# Patient Record
Sex: MALE | Race: BLACK OR AFRICAN AMERICAN | Employment: OTHER | URBAN - METROPOLITAN AREA
[De-identification: names, ages, dates, MRNs, and addresses within clinical notes are randomized per-mention and may not be internally consistent; named-entity substitution may affect disease eponyms.]

---

## 2019-02-08 ENCOUNTER — HOSPITAL ENCOUNTER (OUTPATIENT)
Dept: ULTRASOUND IMAGING | Age: 79
Discharge: HOME | End: 2019-02-08
Attending: INTERNAL MEDICINE
Payer: MEDICARE

## 2019-02-08 DIAGNOSIS — N18.30 CHRONIC KIDNEY DISEASE, STAGE III (MODERATE) (HCC): ICD-10-CM

## 2019-02-08 DIAGNOSIS — E78.00 HYPERCHOLESTEREMIA: ICD-10-CM

## 2019-02-08 DIAGNOSIS — I12.9 MALIGNANT HYPERTENSIVE KIDNEY DISEASE WITH CHRONIC KIDNEY DISEASE STAGE I THROUGH STAGE IV, OR UNSPECIFIED(403.00): ICD-10-CM

## 2019-02-08 PROCEDURE — 76770 US EXAM ABDO BACK WALL COMP: CPT

## 2021-07-27 ENCOUNTER — APPOINTMENT (OUTPATIENT)
Dept: GENERAL RADIOLOGY | Age: 81
End: 2021-07-27
Attending: EMERGENCY MEDICINE
Payer: MEDICARE

## 2021-07-27 ENCOUNTER — HOSPITAL ENCOUNTER (EMERGENCY)
Age: 81
Discharge: HOME | End: 2021-07-27
Attending: EMERGENCY MEDICINE
Payer: MEDICARE

## 2021-07-27 VITALS
WEIGHT: 173.72 LBS | BODY MASS INDEX: 27.92 KG/M2 | TEMPERATURE: 98 F | HEIGHT: 66 IN | SYSTOLIC BLOOD PRESSURE: 157 MMHG | OXYGEN SATURATION: 100 % | DIASTOLIC BLOOD PRESSURE: 68 MMHG | RESPIRATION RATE: 20 BRPM | HEART RATE: 68 BPM

## 2021-07-27 DIAGNOSIS — S46.812A STRAIN OF LEFT TRAPEZIUS MUSCLE, INITIAL ENCOUNTER: ICD-10-CM

## 2021-07-27 DIAGNOSIS — M25.512 PAIN IN JOINT OF LEFT SHOULDER: Primary | ICD-10-CM

## 2021-07-27 PROCEDURE — 99283 EMERGENCY DEPT VISIT LOW MDM: CPT

## 2021-07-27 PROCEDURE — 96372 THER/PROPH/DIAG INJ SC/IM: CPT

## 2021-07-27 PROCEDURE — 74011000250 HC RX REV CODE- 250: Performed by: STUDENT IN AN ORGANIZED HEALTH CARE EDUCATION/TRAINING PROGRAM

## 2021-07-27 PROCEDURE — 73030 X-RAY EXAM OF SHOULDER: CPT

## 2021-07-27 PROCEDURE — 74011250636 HC RX REV CODE- 250/636: Performed by: STUDENT IN AN ORGANIZED HEALTH CARE EDUCATION/TRAINING PROGRAM

## 2021-07-27 RX ORDER — KETOROLAC TROMETHAMINE 30 MG/ML
30 INJECTION, SOLUTION INTRAMUSCULAR; INTRAVENOUS ONCE
Status: COMPLETED | OUTPATIENT
Start: 2021-07-27 | End: 2021-07-27

## 2021-07-27 RX ORDER — LIDOCAINE 4 G/100G
1 PATCH TOPICAL EVERY 24 HOURS
Status: DISCONTINUED | OUTPATIENT
Start: 2021-07-27 | End: 2021-07-27 | Stop reason: HOSPADM

## 2021-07-27 RX ADMIN — KETOROLAC TROMETHAMINE 30 MG: 30 INJECTION, SOLUTION INTRAMUSCULAR; INTRAVENOUS at 05:22

## 2021-07-27 NOTE — DISCHARGE INSTRUCTIONS
You were seen in the ED today due to left shoulder pain. Your x-ray today was negative. Please follow-up with your primary care provider in the next week for further evaluation of your symptoms. You may take over-the-counter pain medicine such as Aleve to aid with your symptoms. Return to the ED for worsening symptoms or any other urgent or emergent concerns you may have. It was a pleasure taking care of you today. Thanks for entrusting the [facility] team with your care today!

## 2021-07-27 NOTE — ED PROVIDER NOTES
EMERGENCY DEPARTMENT HISTORY AND PHYSICAL EXAM          Date: 7/27/2021  Patient Name: Christina Palencia  Attending of Record: Dr. Danay Caceres    History of Presenting Illness     Chief Complaint   Patient presents with    Shoulder Pain     Patient to triage w c/o of L sided shoulder pain that is causing him the inability to walk, he is ambulatory to triage w his cane. History Provided By: Patient    HPI: Christina Palencia is a [de-identified] y.o. male, with past medical history of arthritis, HLD, HTN who comes in complaining of moderate, constant, progressively worsening left shoulder pain onset last night. Patient denies any known traumas or twisting. Notes worsening of pain when trying to sleep. States has been ambulating with cane secondary to shoulder pain, however denies any lower extremity pain. Denies any associated chest pain, shortness of breath, neck pain, headaches, lightheadedness, fevers, or chills. Has not tried anything for pain. Notes worsening of symptoms with head turning and arm movement. Denies ever having symptoms like these before. PCP: Andreia, Not On File    There are no other complaints, changes, or physical findings at this time. Current Facility-Administered Medications   Medication Dose Route Frequency Provider Last Rate Last Admin    lidocaine 4 % patch 1 Patch  1 Patch TransDERmal Q24H Terese Slaughter MD   1 Patch at 07/27/21 0522     Current Outpatient Medications   Medication Sig Dispense Refill    simvastatin (ZOCOR) 40 mg tablet Take  by mouth nightly.  lisinopril-hydrochlorothiazide (PRINZIDE, ZESTORETIC) 20-12.5 mg per tablet Take  by mouth daily.  metoprolol (LOPRESSOR) 100 mg tablet Take  by mouth daily.          Past History     Past Medical History:  Past Medical History:   Diagnosis Date    Arthritis     Cancer ([facility] [address]     PROSTATE    Hypertension     Other ill-defined conditions     elevated cholesterol       Past Surgical History:  Past Surgical History: Procedure Laterality Date    COLONOSCOPY,DIAGNOSTIC  2012         HX ORTHOPAEDIC  2000    L SHOULDER-UNKNOWN PROCEDURE    HX PROSTATECTOMY      [place] PROSTATE BIOPSY, NEEDLE, SATURATION SAMPLING         Family History:  Family History   Problem Relation Age of Onset    Heart Disease Mother     Heart Disease Father     Heart Disease Brother     Heart Disease Other        Social History:  Social History     Tobacco Use    Smoking status: Former Smoker     Packs/day: 0.01     Years: 1.00     Pack years: 0.01     Quit date: 9/10/1980     Years since quittin.9    Smokeless tobacco: Never Used   Substance Use Topics    Alcohol use: No     Comment: OCCASIONAL    Drug use: No       Allergies:  No Known Allergies      Review of Systems   Review of Systems   Constitutional: Negative for chills, diaphoresis and fever. HENT: Negative for rhinorrhea and sore throat. Respiratory: Negative for cough and shortness of breath. Cardiovascular: Negative for chest pain and leg swelling. Gastrointestinal: Negative for abdominal pain, diarrhea, nausea and vomiting. Genitourinary: Negative for dysuria and urgency. Musculoskeletal: Positive for arthralgias and neck pain. Negative for back pain, joint swelling and neck stiffness. Skin: Negative for pallor and rash. Neurological: Negative for dizziness, light-headedness and headaches. All other systems reviewed and are negative. Physical Exam   Physical Exam  Vitals and nursing note reviewed. Constitutional:       General: He is in acute distress. Appearance: Normal appearance. HENT:      Head: Normocephalic and atraumatic. Nose: Nose normal.   Eyes:      Pupils: Pupils are equal, round, and reactive to light. Cardiovascular:      Rate and Rhythm: Normal rate and regular rhythm. Pulses: Normal pulses. Heart sounds: Normal heart sounds.    Pulmonary:      Effort: Pulmonary effort is normal.      Breath sounds: Normal breath sounds. Abdominal:      Palpations: Abdomen is soft. Tenderness: There is no abdominal tenderness. There is no guarding or rebound. Musculoskeletal:         General: Tenderness present. No swelling, deformity or signs of injury. Normal range of motion. Cervical back: Neck supple. No tenderness. Right lower leg: No edema. Left lower leg: No edema. Comments: Mild tenderness to palpation noted to the left upper trap, with muscle tightness noted to the area; limited range of motion when turning to the left   Skin:     General: Skin is warm and dry. Capillary Refill: Capillary refill takes less than 2 seconds. Neurological:      General: No focal deficit present. Mental Status: He is alert and oriented to person, place, and time. Psychiatric:         Mood and Affect: Mood normal.         Behavior: Behavior normal.         Thought Content: Thought content normal.         Judgment: Judgment normal.         Diagnostic Study Results     Labs -   No results found for this or any previous visit (from the past 12 hour(s)). Radiologic Studies -   XR SHOULDER LT AP/LAT MIN 2 V   Final Result   No acute abnormality. CT Results  (Last 48 hours)    None        CXR Results  (Last 48 hours)    None            Medical Decision Making   I am the first provider for this patient. I reviewed the vital signs, available nursing notes, past medical history, past surgical history, family history and social history. Vital Signs-Reviewed the patient's vital signs. Patient Vitals for the past 12 hrs:   Temp Pulse Resp BP SpO2   07/27/21 0248 98 °F (36.7 °C) 68 20 (!) 157/68 100 %         Records Reviewed: Nursing Notes and Old Medical Records    Provider Notes (Medical Decision Making):   Suzy Tran is a [de-identified] y.o. male, with past medical history of arthritis, HLD, HTN who comes in complaining of moderate, constant, progressively worsening left shoulder pain onset last night.   Patient is hemodynamically stable, afebrile, nontoxic-appearing. Full range of motion of the left shoulder without pain elicited on exam.  Tenderness to palpation and tight muscles noted to the left trapezius. Limited range of motion when looking to the left. I suspect the symptoms are secondary to muscular strain. Arthritis is also on the differential.  I have no concern for any fractures or dislocations as patient denies any traumas or falls. Shoulder x-ray ordered to assess for any bony abnormalities. I also have no concern for ACS at this time as this would be an extremely atypical presentation, patient denies any chest pain shortness of breath lightheadedness or any symptoms suggestive of ACS. Will give IM Toradol and lidocaine patch to aid in symptoms. Disposition pending results of x-ray although anticipate that patient will be safe for discharge. ED Course and Progress Notes:   Initial assessment performed. The patients presenting problems have been discussed, and they are in agreement with the care plan formulated and outlined with them. I have encouraged them to ask questions as they arise throughout their visit. ED Course as of Jul 27 0619   Tue Jul 27, 2021   3025 Patient reassessed, states that he is feeling better. I discussed results of imaging with patient as well as care plan, return precautions, and follow-up. Patient verbalizes understanding and agrees with care plan. Will prepare for discharge. [MA]      ED Course User Index  [MA] Hiram Nicholson MD         Diagnosis     Clinical Impression:   1. Pain in joint of left shoulder    2. Strain of left trapezius muscle, initial encounter        Disposition:  Home    DISCHARGE PLAN:    1. Discharge Medication List as of 7/27/2021  6:11 AM        2.    Follow-up Information     Follow up With Specialties Details Why Contact Info    [facility] EMERGENCY DEPT Emergency Medicine Go to  As needed, If symptoms worsen [id number] [facility] [place]  [phone]        Please follow-up with your primary care provider within 1 week for further evaluation.         3. Return to ED if worse         Resident Signature: Dr. Louie Graf; PGY-2, Emergency [facility]

## 2021-10-12 ENCOUNTER — ANESTHESIA EVENT (OUTPATIENT)
Dept: SURGERY | Age: 81
DRG: 335 | End: 2021-10-12
Payer: MEDICARE

## 2021-10-12 ENCOUNTER — HOSPITAL ENCOUNTER (INPATIENT)
Age: 81
LOS: 3 days | Discharge: HOME | DRG: 335 | End: 2021-10-15
Attending: EMERGENCY MEDICINE | Admitting: SURGERY
Payer: MEDICARE

## 2021-10-12 ENCOUNTER — ANESTHESIA (OUTPATIENT)
Dept: SURGERY | Age: 81
DRG: 335 | End: 2021-10-12
Payer: MEDICARE

## 2021-10-12 ENCOUNTER — APPOINTMENT (OUTPATIENT)
Dept: CT IMAGING | Age: 81
DRG: 335 | End: 2021-10-12
Attending: STUDENT IN AN ORGANIZED HEALTH CARE EDUCATION/TRAINING PROGRAM
Payer: MEDICARE

## 2021-10-12 DIAGNOSIS — K55.019: ICD-10-CM

## 2021-10-12 DIAGNOSIS — K55.9 MESENTERIC ISCHEMIA (HCC): ICD-10-CM

## 2021-10-12 DIAGNOSIS — K46.9 ABDOMINAL HERNIA WITHOUT OBSTRUCTION AND WITHOUT GANGRENE, RECURRENCE NOT SPECIFIED, UNSPECIFIED HERNIA TYPE: Primary | ICD-10-CM

## 2021-10-12 DIAGNOSIS — K56.609 SBO (SMALL BOWEL OBSTRUCTION) (HCC): ICD-10-CM

## 2021-10-12 LAB
ALBUMIN SERPL-MCNC: 3.3 G/DL (ref 3.5–5)
ALBUMIN/GLOB SERPL: 0.8 {RATIO} (ref 1.1–2.2)
ALP SERPL-CCNC: 82 U/L (ref 45–117)
ALT SERPL-CCNC: 34 U/L (ref 12–78)
ANION GAP SERPL CALC-SCNC: 5 MMOL/L (ref 5–15)
APPEARANCE UR: CLEAR
AST SERPL-CCNC: 31 U/L (ref 15–37)
ATRIAL RATE: 54 BPM
BACTERIA URNS QL MICRO: NEGATIVE /HPF
BASOPHILS # BLD: 0 K/UL (ref 0–0.1)
BASOPHILS NFR BLD: 0 % (ref 0–1)
BILIRUB SERPL-MCNC: 1.3 MG/DL (ref 0.2–1)
BILIRUB UR QL: NEGATIVE
BUN SERPL-MCNC: 24 MG/DL (ref 6–20)
BUN/CREAT SERPL: 14 (ref 12–20)
CALCIUM SERPL-MCNC: 9.3 MG/DL (ref 8.5–10.1)
CALCULATED P AXIS, ECG09: 23 DEGREES
CALCULATED R AXIS, ECG10: 41 DEGREES
CALCULATED T AXIS, ECG11: 71 DEGREES
CHLORIDE SERPL-SCNC: 110 MMOL/L (ref 97–108)
CO2 SERPL-SCNC: 26 MMOL/L (ref 21–32)
COLOR UR: ABNORMAL
CREAT SERPL-MCNC: 1.77 MG/DL (ref 0.7–1.3)
DIAGNOSIS, 93000: NORMAL
DIFFERENTIAL METHOD BLD: ABNORMAL
EOSINOPHIL # BLD: 0 K/UL (ref 0–0.4)
EOSINOPHIL NFR BLD: 0 % (ref 0–7)
EPITH CASTS URNS QL MICRO: ABNORMAL /LPF
ERYTHROCYTE [DISTWIDTH] IN BLOOD BY AUTOMATED COUNT: 13.1 % (ref 11.5–14.5)
GLOBULIN SER CALC-MCNC: 3.9 G/DL (ref 2–4)
GLUCOSE SERPL-MCNC: 110 MG/DL (ref 65–100)
GLUCOSE UR STRIP.AUTO-MCNC: NEGATIVE MG/DL
HCT VFR BLD AUTO: 38.6 % (ref 36.6–50.3)
HGB BLD-MCNC: 12.7 G/DL (ref 12.1–17)
HGB UR QL STRIP: NEGATIVE
HYALINE CASTS URNS QL MICRO: ABNORMAL /LPF (ref 0–5)
IMM GRANULOCYTES # BLD AUTO: 0 K/UL (ref 0–0.04)
IMM GRANULOCYTES NFR BLD AUTO: 0 % (ref 0–0.5)
KETONES UR QL STRIP.AUTO: ABNORMAL MG/DL
LEUKOCYTE ESTERASE UR QL STRIP.AUTO: NEGATIVE
LIPASE SERPL-CCNC: 115 U/L (ref 73–393)
LYMPHOCYTES # BLD: 1 K/UL (ref 0.8–3.5)
LYMPHOCYTES NFR BLD: 10 % (ref 12–49)
MCH RBC QN AUTO: 27 PG (ref 26–34)
MCHC RBC AUTO-ENTMCNC: 32.9 G/DL (ref 30–36.5)
MCV RBC AUTO: 82.1 FL (ref 80–99)
MONOCYTES # BLD: 0.9 K/UL (ref 0–1)
MONOCYTES NFR BLD: 9 % (ref 5–13)
NEUTS SEG # BLD: 8.3 K/UL (ref 1.8–8)
NEUTS SEG NFR BLD: 81 % (ref 32–75)
NITRITE UR QL STRIP.AUTO: NEGATIVE
NRBC # BLD: 0 K/UL (ref 0–0.01)
NRBC BLD-RTO: 0 PER 100 WBC
P-R INTERVAL, ECG05: 140 MS
PH UR STRIP: 6 [PH] (ref 5–8)
PLATELET # BLD AUTO: 217 K/UL (ref 150–400)
PMV BLD AUTO: 9.8 FL (ref 8.9–12.9)
POTASSIUM SERPL-SCNC: 4.5 MMOL/L (ref 3.5–5.1)
PROT SERPL-MCNC: 7.2 G/DL (ref 6.4–8.2)
PROT UR STRIP-MCNC: NEGATIVE MG/DL
Q-T INTERVAL, ECG07: 448 MS
QRS DURATION, ECG06: 88 MS
QTC CALCULATION (BEZET), ECG08: 424 MS
RBC # BLD AUTO: 4.7 M/UL (ref 4.1–5.7)
RBC #/AREA URNS HPF: ABNORMAL /HPF (ref 0–5)
SODIUM SERPL-SCNC: 141 MMOL/L (ref 136–145)
SP GR UR REFRACTOMETRY: 1.01 (ref 1–1.03)
TROPONIN-HIGH SENSITIVITY: 5 NG/L (ref 0–76)
UA: UC IF INDICATED,UAUC: ABNORMAL
UROBILINOGEN UR QL STRIP.AUTO: 1 EU/DL (ref 0.2–1)
VENTRICULAR RATE, ECG03: 54 BPM
WBC # BLD AUTO: 10.3 K/UL (ref 4.1–11.1)
WBC URNS QL MICRO: ABNORMAL /HPF (ref 0–4)

## 2021-10-12 PROCEDURE — 74177 CT ABD & PELVIS W/CONTRAST: CPT

## 2021-10-12 PROCEDURE — 44005 FREEING OF BOWEL ADHESION: CPT | Performed by: SURGERY

## 2021-10-12 PROCEDURE — 77030011278 HC ELECTRD LIG IMPT COVD -F: Performed by: SURGERY

## 2021-10-12 PROCEDURE — 77030035236 HC SUT PDS STRATFX BARB J&J -B: Performed by: SURGERY

## 2021-10-12 PROCEDURE — 74011250636 HC RX REV CODE- 250/636: Performed by: ANESTHESIOLOGY

## 2021-10-12 PROCEDURE — P9045 ALBUMIN (HUMAN), 5%, 250 ML: HCPCS | Performed by: ANESTHESIOLOGY

## 2021-10-12 PROCEDURE — 77030002996 HC SUT SLK J&J -A: Performed by: SURGERY

## 2021-10-12 PROCEDURE — 77030018390 HC SPNG HEMSTAT2 J&J -B: Performed by: SURGERY

## 2021-10-12 PROCEDURE — 76060000033 HC ANESTHESIA 1 TO 1.5 HR: Performed by: SURGERY

## 2021-10-12 PROCEDURE — 77030005513 HC CATH URETH FOL11 MDII -B: Performed by: SURGERY

## 2021-10-12 PROCEDURE — 77030016154: Performed by: SURGERY

## 2021-10-12 PROCEDURE — 74011250636 HC RX REV CODE- 250/636: Performed by: SURGERY

## 2021-10-12 PROCEDURE — 81001 URINALYSIS AUTO W/SCOPE: CPT

## 2021-10-12 PROCEDURE — 84484 ASSAY OF TROPONIN QUANT: CPT

## 2021-10-12 PROCEDURE — 36415 COLL VENOUS BLD VENIPUNCTURE: CPT

## 2021-10-12 PROCEDURE — 76210000016 HC OR PH I REC 1 TO 1.5 HR: Performed by: SURGERY

## 2021-10-12 PROCEDURE — 83690 ASSAY OF LIPASE: CPT

## 2021-10-12 PROCEDURE — 99222 1ST HOSP IP/OBS MODERATE 55: CPT | Performed by: SURGERY

## 2021-10-12 PROCEDURE — 74011000636 HC RX REV CODE- 636: Performed by: EMERGENCY MEDICINE

## 2021-10-12 PROCEDURE — 2709999900 HC NON-CHARGEABLE SUPPLY: Performed by: SURGERY

## 2021-10-12 PROCEDURE — 96374 THER/PROPH/DIAG INJ IV PUSH: CPT

## 2021-10-12 PROCEDURE — 99284 EMERGENCY DEPT VISIT MOD MDM: CPT

## 2021-10-12 PROCEDURE — 80053 COMPREHEN METABOLIC PANEL: CPT

## 2021-10-12 PROCEDURE — 93005 ELECTROCARDIOGRAM TRACING: CPT

## 2021-10-12 PROCEDURE — 74011250636 HC RX REV CODE- 250/636: Performed by: STUDENT IN AN ORGANIZED HEALTH CARE EDUCATION/TRAINING PROGRAM

## 2021-10-12 PROCEDURE — 76010000149 HC OR TIME 1 TO 1.5 HR: Performed by: SURGERY

## 2021-10-12 PROCEDURE — 77030038692 HC WND DEB SYS IRMX -B: Performed by: SURGERY

## 2021-10-12 PROCEDURE — 77030040361 HC SLV COMPR DVT MDII -B: Performed by: SURGERY

## 2021-10-12 PROCEDURE — 85025 COMPLETE CBC W/AUTO DIFF WBC: CPT

## 2021-10-12 PROCEDURE — 77030011264 HC ELECTRD BLD EXT COVD -A: Performed by: SURGERY

## 2021-10-12 PROCEDURE — 77030013079 HC BLNKT BAIR HGGR 3M -A: Performed by: ANESTHESIOLOGY

## 2021-10-12 PROCEDURE — 77030008684 HC TU ET CUF COVD -B: Performed by: ANESTHESIOLOGY

## 2021-10-12 PROCEDURE — 65270000029 HC RM PRIVATE

## 2021-10-12 PROCEDURE — 77030008462 HC STPLR SKN PROX J&J -A: Performed by: SURGERY

## 2021-10-12 PROCEDURE — 74011000250 HC RX REV CODE- 250: Performed by: ANESTHESIOLOGY

## 2021-10-12 PROCEDURE — 77030026438 HC STYL ET INTUB CARD -A: Performed by: ANESTHESIOLOGY

## 2021-10-12 PROCEDURE — 77030019908 HC STETH ESOPH SIMS -A: Performed by: ANESTHESIOLOGY

## 2021-10-12 RX ORDER — ROCURONIUM BROMIDE 10 MG/ML
INJECTION, SOLUTION INTRAVENOUS AS NEEDED
Status: DISCONTINUED | OUTPATIENT
Start: 2021-10-12 | End: 2021-10-12 | Stop reason: HOSPADM

## 2021-10-12 RX ORDER — HYDROCHLOROTHIAZIDE 25 MG/1
12.5 TABLET ORAL DAILY
Status: DISCONTINUED | OUTPATIENT
Start: 2021-10-13 | End: 2021-10-13

## 2021-10-12 RX ORDER — HYDROMORPHONE HYDROCHLORIDE 2 MG/ML
INJECTION, SOLUTION INTRAMUSCULAR; INTRAVENOUS; SUBCUTANEOUS AS NEEDED
Status: DISCONTINUED | OUTPATIENT
Start: 2021-10-12 | End: 2021-10-12 | Stop reason: HOSPADM

## 2021-10-12 RX ORDER — SODIUM CHLORIDE, SODIUM LACTATE, POTASSIUM CHLORIDE, CALCIUM CHLORIDE 600; 310; 30; 20 MG/100ML; MG/100ML; MG/100ML; MG/100ML
25 INJECTION, SOLUTION INTRAVENOUS CONTINUOUS
Status: DISCONTINUED | OUTPATIENT
Start: 2021-10-12 | End: 2021-10-12 | Stop reason: HOSPADM

## 2021-10-12 RX ORDER — LIDOCAINE HYDROCHLORIDE 10 MG/ML
0.1 INJECTION, SOLUTION EPIDURAL; INFILTRATION; INTRACAUDAL; PERINEURAL AS NEEDED
Status: DISCONTINUED | OUTPATIENT
Start: 2021-10-12 | End: 2021-10-12 | Stop reason: HOSPADM

## 2021-10-12 RX ORDER — FENTANYL CITRATE 50 UG/ML
50 INJECTION, SOLUTION INTRAMUSCULAR; INTRAVENOUS AS NEEDED
Status: DISCONTINUED | OUTPATIENT
Start: 2021-10-12 | End: 2021-10-12 | Stop reason: HOSPADM

## 2021-10-12 RX ORDER — LORAZEPAM 2 MG/ML
1 INJECTION INTRAMUSCULAR
Status: DISCONTINUED | OUTPATIENT
Start: 2021-10-12 | End: 2021-10-15 | Stop reason: HOSPADM

## 2021-10-12 RX ORDER — METOPROLOL TARTRATE 50 MG/1
100 TABLET ORAL DAILY
Status: DISCONTINUED | OUTPATIENT
Start: 2021-10-13 | End: 2021-10-13

## 2021-10-12 RX ORDER — ACETAMINOPHEN 325 MG/1
650 TABLET ORAL
Status: DISCONTINUED | OUTPATIENT
Start: 2021-10-12 | End: 2021-10-15 | Stop reason: HOSPADM

## 2021-10-12 RX ORDER — SODIUM CHLORIDE, SODIUM LACTATE, POTASSIUM CHLORIDE, CALCIUM CHLORIDE 600; 310; 30; 20 MG/100ML; MG/100ML; MG/100ML; MG/100ML
INJECTION, SOLUTION INTRAVENOUS
Status: DISCONTINUED | OUTPATIENT
Start: 2021-10-12 | End: 2021-10-12 | Stop reason: HOSPADM

## 2021-10-12 RX ORDER — ONDANSETRON 2 MG/ML
INJECTION INTRAMUSCULAR; INTRAVENOUS AS NEEDED
Status: DISCONTINUED | OUTPATIENT
Start: 2021-10-12 | End: 2021-10-12 | Stop reason: HOSPADM

## 2021-10-12 RX ORDER — SODIUM CHLORIDE 0.9 % (FLUSH) 0.9 %
5-40 SYRINGE (ML) INJECTION EVERY 8 HOURS
Status: DISCONTINUED | OUTPATIENT
Start: 2021-10-12 | End: 2021-10-15 | Stop reason: HOSPADM

## 2021-10-12 RX ORDER — ENOXAPARIN SODIUM 100 MG/ML
80 INJECTION SUBCUTANEOUS EVERY 12 HOURS
Status: DISCONTINUED | OUTPATIENT
Start: 2021-10-12 | End: 2021-10-14

## 2021-10-12 RX ORDER — LIDOCAINE HYDROCHLORIDE 20 MG/ML
INJECTION, SOLUTION EPIDURAL; INFILTRATION; INTRACAUDAL; PERINEURAL AS NEEDED
Status: DISCONTINUED | OUTPATIENT
Start: 2021-10-12 | End: 2021-10-12 | Stop reason: HOSPADM

## 2021-10-12 RX ORDER — ONDANSETRON 2 MG/ML
4 INJECTION INTRAMUSCULAR; INTRAVENOUS ONCE
Status: COMPLETED | OUTPATIENT
Start: 2021-10-12 | End: 2021-10-12

## 2021-10-12 RX ORDER — ONDANSETRON 2 MG/ML
4 INJECTION INTRAMUSCULAR; INTRAVENOUS
Status: DISCONTINUED | OUTPATIENT
Start: 2021-10-12 | End: 2021-10-15 | Stop reason: HOSPADM

## 2021-10-12 RX ORDER — SODIUM CHLORIDE 0.9 % (FLUSH) 0.9 %
5-40 SYRINGE (ML) INJECTION AS NEEDED
Status: DISCONTINUED | OUTPATIENT
Start: 2021-10-12 | End: 2021-10-15 | Stop reason: HOSPADM

## 2021-10-12 RX ORDER — DEXTROSE, SODIUM CHLORIDE, AND POTASSIUM CHLORIDE 5; .45; .15 G/100ML; G/100ML; G/100ML
50 INJECTION INTRAVENOUS CONTINUOUS
Status: DISCONTINUED | OUTPATIENT
Start: 2021-10-12 | End: 2021-10-15 | Stop reason: HOSPADM

## 2021-10-12 RX ORDER — FENTANYL CITRATE 50 UG/ML
25 INJECTION, SOLUTION INTRAMUSCULAR; INTRAVENOUS
Status: COMPLETED | OUTPATIENT
Start: 2021-10-12 | End: 2021-10-12

## 2021-10-12 RX ORDER — HYDROMORPHONE HYDROCHLORIDE 1 MG/ML
.2-.5 INJECTION, SOLUTION INTRAMUSCULAR; INTRAVENOUS; SUBCUTANEOUS
Status: DISCONTINUED | OUTPATIENT
Start: 2021-10-12 | End: 2021-10-12 | Stop reason: HOSPADM

## 2021-10-12 RX ORDER — LISINOPRIL 20 MG/1
20 TABLET ORAL DAILY
Status: DISCONTINUED | OUTPATIENT
Start: 2021-10-13 | End: 2021-10-13

## 2021-10-12 RX ORDER — NALOXONE HYDROCHLORIDE 0.4 MG/ML
0.4 INJECTION, SOLUTION INTRAMUSCULAR; INTRAVENOUS; SUBCUTANEOUS AS NEEDED
Status: DISCONTINUED | OUTPATIENT
Start: 2021-10-12 | End: 2021-10-15 | Stop reason: HOSPADM

## 2021-10-12 RX ORDER — PROPOFOL 10 MG/ML
INJECTION, EMULSION INTRAVENOUS AS NEEDED
Status: DISCONTINUED | OUTPATIENT
Start: 2021-10-12 | End: 2021-10-12 | Stop reason: HOSPADM

## 2021-10-12 RX ORDER — ONDANSETRON 2 MG/ML
4 INJECTION INTRAMUSCULAR; INTRAVENOUS AS NEEDED
Status: DISCONTINUED | OUTPATIENT
Start: 2021-10-12 | End: 2021-10-12 | Stop reason: HOSPADM

## 2021-10-12 RX ORDER — ALBUMIN HUMAN 50 G/1000ML
SOLUTION INTRAVENOUS AS NEEDED
Status: DISCONTINUED | OUTPATIENT
Start: 2021-10-12 | End: 2021-10-12 | Stop reason: HOSPADM

## 2021-10-12 RX ORDER — MIDAZOLAM HYDROCHLORIDE 1 MG/ML
1 INJECTION, SOLUTION INTRAMUSCULAR; INTRAVENOUS AS NEEDED
Status: DISCONTINUED | OUTPATIENT
Start: 2021-10-12 | End: 2021-10-12 | Stop reason: HOSPADM

## 2021-10-12 RX ORDER — SUCCINYLCHOLINE CHLORIDE 20 MG/ML
INJECTION INTRAMUSCULAR; INTRAVENOUS AS NEEDED
Status: DISCONTINUED | OUTPATIENT
Start: 2021-10-12 | End: 2021-10-12 | Stop reason: HOSPADM

## 2021-10-12 RX ORDER — DIPHENHYDRAMINE HYDROCHLORIDE 50 MG/ML
25 INJECTION, SOLUTION INTRAMUSCULAR; INTRAVENOUS
Status: DISCONTINUED | OUTPATIENT
Start: 2021-10-12 | End: 2021-10-15 | Stop reason: HOSPADM

## 2021-10-12 RX ORDER — CEFAZOLIN SODIUM 1 G/3ML
2 INJECTION, POWDER, FOR SOLUTION INTRAMUSCULAR; INTRAVENOUS ONCE
Status: COMPLETED | OUTPATIENT
Start: 2021-10-12 | End: 2021-10-12

## 2021-10-12 RX ORDER — MORPHINE SULFATE 2 MG/ML
2 INJECTION, SOLUTION INTRAMUSCULAR; INTRAVENOUS
Status: DISCONTINUED | OUTPATIENT
Start: 2021-10-12 | End: 2021-10-15 | Stop reason: HOSPADM

## 2021-10-12 RX ADMIN — ONDANSETRON 4 MG: 2 INJECTION INTRAMUSCULAR; INTRAVENOUS at 11:09

## 2021-10-12 RX ADMIN — ALBUMIN (HUMAN) 250 ML: 2.5 SOLUTION INTRAVENOUS at 14:20

## 2021-10-12 RX ADMIN — SUGAMMADEX 200 MG: 100 INJECTION, SOLUTION INTRAVENOUS at 14:33

## 2021-10-12 RX ADMIN — PHENYLEPHRINE HYDROCHLORIDE 80 MCG: 10 INJECTION INTRAVENOUS at 14:14

## 2021-10-12 RX ADMIN — Medication 10 ML: at 21:28

## 2021-10-12 RX ADMIN — SUCCINYLCHOLINE CHLORIDE 200 MG: 20 INJECTION, SOLUTION INTRAMUSCULAR; INTRAVENOUS at 13:41

## 2021-10-12 RX ADMIN — FENTANYL CITRATE 25 MCG: 50 INJECTION INTRAMUSCULAR; INTRAVENOUS at 15:20

## 2021-10-12 RX ADMIN — PHENYLEPHRINE HYDROCHLORIDE 80 MCG: 10 INJECTION INTRAVENOUS at 14:17

## 2021-10-12 RX ADMIN — FENTANYL CITRATE 25 MCG: 50 INJECTION INTRAMUSCULAR; INTRAVENOUS at 15:15

## 2021-10-12 RX ADMIN — PHENYLEPHRINE HYDROCHLORIDE 80 MCG: 10 INJECTION INTRAVENOUS at 14:02

## 2021-10-12 RX ADMIN — SODIUM CHLORIDE, POTASSIUM CHLORIDE, SODIUM LACTATE AND CALCIUM CHLORIDE: 600; 310; 30; 20 INJECTION, SOLUTION INTRAVENOUS at 13:37

## 2021-10-12 RX ADMIN — POTASSIUM CHLORIDE, DEXTROSE MONOHYDRATE AND SODIUM CHLORIDE 125 ML/HR: 150; 5; 450 INJECTION, SOLUTION INTRAVENOUS at 18:51

## 2021-10-12 RX ADMIN — FENTANYL CITRATE 25 MCG: 50 INJECTION INTRAMUSCULAR; INTRAVENOUS at 15:30

## 2021-10-12 RX ADMIN — FENTANYL CITRATE 25 MCG: 50 INJECTION INTRAMUSCULAR; INTRAVENOUS at 15:10

## 2021-10-12 RX ADMIN — ONDANSETRON HYDROCHLORIDE 4 MG: 2 INJECTION, SOLUTION INTRAMUSCULAR; INTRAVENOUS at 14:01

## 2021-10-12 RX ADMIN — FENTANYL CITRATE 25 MCG: 50 INJECTION INTRAMUSCULAR; INTRAVENOUS at 15:05

## 2021-10-12 RX ADMIN — IOPAMIDOL 100 ML: 755 INJECTION, SOLUTION INTRAVENOUS at 11:06

## 2021-10-12 RX ADMIN — LIDOCAINE HYDROCHLORIDE 100 MG: 20 INJECTION, SOLUTION INTRAVENOUS at 13:41

## 2021-10-12 RX ADMIN — ROCURONIUM BROMIDE 20 MG: 10 INJECTION INTRAVENOUS at 13:57

## 2021-10-12 RX ADMIN — HYDROMORPHONE HYDROCHLORIDE 1 MG: 2 INJECTION, SOLUTION INTRAMUSCULAR; INTRAVENOUS; SUBCUTANEOUS at 13:41

## 2021-10-12 RX ADMIN — ENOXAPARIN SODIUM 80 MG: 80 INJECTION SUBCUTANEOUS at 21:28

## 2021-10-12 RX ADMIN — FENTANYL CITRATE 25 MCG: 50 INJECTION INTRAMUSCULAR; INTRAVENOUS at 15:40

## 2021-10-12 RX ADMIN — PHENYLEPHRINE HYDROCHLORIDE 80 MCG: 10 INJECTION INTRAVENOUS at 14:04

## 2021-10-12 RX ADMIN — CEFAZOLIN 2 G: 1 INJECTION, POWDER, FOR SOLUTION INTRAMUSCULAR; INTRAVENOUS; PARENTERAL at 13:53

## 2021-10-12 RX ADMIN — FENTANYL CITRATE 25 MCG: 50 INJECTION INTRAMUSCULAR; INTRAVENOUS at 15:00

## 2021-10-12 RX ADMIN — PROPOFOL 150 MG: 10 INJECTION, EMULSION INTRAVENOUS at 13:41

## 2021-10-12 RX ADMIN — FENTANYL CITRATE 25 MCG: 50 INJECTION INTRAMUSCULAR; INTRAVENOUS at 15:25

## 2021-10-12 NOTE — PERIOP NOTES
13:19= has not had COVID vaccination, been tested, been exposed or had any s/s virus. Warming blanket applied. 13:30= mepilex dsg applied to sacrum; no erythema or skin breakdown noted; skin CDI.     13:43= called son, Chato Berry, to inform that his father had gone to surgery and Dr Romeo Gardiner will call him to update afterwards.

## 2021-10-12 NOTE — H&P
Surgery Consult    Subjective:      Landy Potter is a [de-identified] y.o. male who is being seen for evaluation of abdominal pain. The pain is located in the suprapubic. Pain is described as cramping and colicky and measures 3/27 in intensity. Onset of pain was 1 day ago. Aggravating factors include movement, activity and pressure. Alleviating factors include none. Associated symptoms include nausea. He has had a robotic prostatectomy in the past.      Past Medical History:   Diagnosis Date    Arthritis     Cancer ([address]     PROSTATE    Hypertension     Other ill-defined conditions(799.89)     elevated cholesterol      Past Surgical History:   Procedure Laterality Date    COLONOSCOPY,DIAGNOSTIC  2012         HX ORTHOPAEDIC  2000    L SHOULDER-UNKNOWN PROCEDURE    HX PROSTATECTOMY      [place] PROSTATE BIOPSY, NEEDLE, SATURATION SAMPLING        Social History     Tobacco Use    Smoking status: Former Smoker     Packs/day: 0.01     Years: 1.00     Pack years: 0.01     Quit date: 9/10/1980     Years since quittin.1    Smokeless tobacco: Never Used   Substance Use Topics    Alcohol use: Yes     Comment: OCCASIONAL      Family History   Problem Relation Age of Onset    Heart Disease Mother     Heart Disease Father     Heart Disease Brother     Heart Disease Other       No current facility-administered medications for this encounter. No Known Allergies    Review of Systems:    A comprehensive review of systems was negative except for that written in the History of Present Illness.     Objective:        Visit Vitals  /73 (BP 1 Location: Right upper arm, BP Patient Position: At rest)   Pulse 61   Temp 99.5 °F (37.5 °C)   Resp 20   Ht 5' 7\" (1.702 m)   Wt 77.1 kg (170 lb)   SpO2 100%   BMI 26.63 kg/m²       Physical Exam:  GENERAL: alert, cooperative, no distress, appears stated age, EYE: negative, LUNG: clear to auscultation bilaterally, HEART: regular rate and rhythm, S1, S2 normal, no murmur, click, rub or gallop, ABDOMEN: distended, tympanic, soft, no real tenderness     Imaging:    IMPRESSION  1. Mid abdominal internal hernia with significant mass effect on the superior  mesenteric vein and possibly the superior mesenteric artery at this level. More  distal superior mesenteric veins are enlarged, edematous and hypodense,  suggesting a lack of adequate perfusion. Recommend emergent surgical  consultation. 2. Several nonspecific sclerotic osseous foci, as described above. The patient's  clinical history of prostate CA, recommend correlation with appropriate clinical  parameters and consider bone scan or MR for further evaluation.     Findings were communicated verbally to the ED at the time of interpretation. 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 I recommend proceeding with Surgery:  Exploratory laparotomy. 2. Discussed aspects of surgical intervention, methods, risks (including by not limited to infection, bleeding, hematoma, and perforation of the intestines or solid organs), and the risks of general anesthetic. The patient understands the risks; any and all questions were answered to the patient's satisfaction. 3. Patient does wish to proceed with surgery.

## 2021-10-12 NOTE — ED PROVIDER NOTES
EMERGENCY DEPARTMENT HISTORY AND PHYSICAL EXAM          Date: 10/12/2021  Patient Name: Daryle Block  Attending of Record:     History of Presenting Illness     Chief Complaint   Patient presents with    Abdominal Pain     diffuse abdominal pain since Sunday with nausea but no v/d or fever       History Provided By: Patient    HPI: Daryle Block is a [de-identified] y.o. male with PMH of HTN, HLD, arthritis who is presenting to the emergency department with abdominal pain that started yesterday morning. Patient describes this pain as crampy/achy pain. He is unsure if anything makes the pain worse or better. He has been able to tolerate p.o., but has had significant nausea. He does endorse subjective fever, saying that he felt very sweaty and hot earlier this morning. Denies any headache, cough, congestion, chest pain, shortness of breath, vomiting, diarrhea, constipation, bloody bowel movements dysuria, hematuria. ROS is otherwise negative. PCP: Andreia Not On File, MD    There are no other complaints, changes, or physical findings at this time.      Current Facility-Administered Medications   Medication Dose Route Frequency Provider Last Rate Last Admin    [START ON 10/13/2021] metoprolol tartrate (LOPRESSOR) tablet 100 mg  100 mg Oral DAILY Kylie Coburn MD        dextrose 5% - 0.45% NaCl with KCl 20 mEq/L infusion  125 mL/hr IntraVENous CONTINUOUS Kylie Coburn MD        sodium chloride (NS) flush 5-40 mL  5-40 mL IntraVENous Q8H Kylie Coburn MD        sodium chloride (NS) flush 5-40 mL  5-40 mL IntraVENous PRN Kylie Coburn MD        acetaminophen (TYLENOL) tablet 650 mg  650 mg Oral Q6H PRN Kylie Coburn MD        naloxone [facility]) injection 0.4 mg  0.4 mg IntraVENous PRN Kylie Coburn MD        ondansetron [facility]) injection 4 mg  4 mg IntraVENous Q4H PRN Kylie Coburn MD        diphenhydrAMINE (BENADRYL) injection 25 mg  25 mg IntraVENous Q6H PRN Kylie Coburn MD        morphine injection 2 mg  2 mg IntraVENous Q2H PRN Aime Jameson MD        LORazepam (ATIVAN) injection 1 mg  1 mg IntraVENous Q4H PRN Aime Jameson MD        enoxaparin (LOVENOX) injection 80 mg  80 mg SubCUTAneous Q12H Aime Jameson MD        [START ON 10/13/2021] lisinopriL (PRINIVIL, ZESTRIL) tablet 20 mg  20 mg Oral DAILY Aime Jameson MD        And    [START ON 10/13/2021] hydroCHLOROthiazide (HYDRODIURIL) tablet 12.5 mg  12.5 mg Oral DAILY Aime Jameson MD           Past History     Past Medical History:  Past Medical History:   Diagnosis Date    Arthritis     Cancer ([facility] [address]     PROSTATE    Hypertension     Other ill-defined conditions(799.89)     elevated cholesterol       Past Surgical History:  Past Surgical History:   Procedure Laterality Date    COLONOSCOPY,DIAGNOSTIC  2012         HX ORTHOPAEDIC  2000    L SHOULDER-UNKNOWN PROCEDURE    HX PROSTATECTOMY      MT PROSTATE BIOPSY, NEEDLE, SATURATION SAMPLING         Family History:  Family History   Problem Relation Age of Onset    Heart Disease Mother     Heart Disease Father     Heart Disease Brother     Heart Disease Other        Social History:  Social History     Tobacco Use    Smoking status: Former Smoker     Packs/day: 0.01     Years: 1.00     Pack years: 0.01     Quit date: 9/10/1980     Years since quittin.1    Smokeless tobacco: Never Used   Substance Use Topics    Alcohol use: Yes     Comment: OCCASIONAL    Drug use: No       Allergies:  No Known Allergies      Review of Systems   Review of Systems   Constitutional: Positive for diaphoresis and fever (subjective). Negative for chills. HENT: Negative for congestion and sore throat. Respiratory: Negative for cough and shortness of breath. Cardiovascular: Negative for chest pain and leg swelling. Gastrointestinal: Positive for abdominal pain (periumbilical, suprapubic) and nausea. Negative for blood in stool, constipation, diarrhea and vomiting. Genitourinary: Negative for dysuria and hematuria. Musculoskeletal: Negative for arthralgias. Skin: Negative for rash. Neurological: Negative for light-headedness and headaches. Hematological: Does not bruise/bleed easily. Physical Exam   Physical Exam  Constitutional:       General: He is not in acute distress. HENT:      Head: Normocephalic and atraumatic. Nose: Nose normal.   Eyes:      Conjunctiva/sclera: Conjunctivae normal.   Cardiovascular:      Comments: Normal peripheral perfusion  Pulmonary:      Effort: Pulmonary effort is normal. No respiratory distress. Abdominal:      General: There is no distension. Palpations: Abdomen is soft. Tenderness: There is abdominal tenderness (mild TTP over suprapubic region). There is no guarding. Musculoskeletal:         General: No swelling or deformity. Cervical back: Neck supple. Skin:     General: Skin is warm and dry. Neurological:      Mental Status: He is alert and oriented to person, place, and time. Comments: Moving all extremities spontaneously    Psychiatric:         Mood and Affect: Mood normal.         Behavior: Behavior normal.       Diagnostic Study Results     Labs -     Recent Results (from the past 12 hour(s))   CBC WITH AUTOMATED DIFF    Collection Time: 10/12/21 10:08 AM   Result Value Ref Range    WBC 10.3 4.1 - 11.1 K/uL    RBC 4.70 4. 10 - 5.70 M/uL    HGB 12.7 12.1 - 17.0 g/dL    HCT 38.6 36.6 - 50.3 %    MCV 82.1 80.0 - 99.0 FL    MCH 27.0 26.0 - 34.0 PG    MCHC 32.9 30.0 - 36.5 g/dL    RDW 13.1 11.5 - 14.5 %    PLATELET 320 859 - 798 K/uL    MPV 9.8 8.9 - 12.9 FL    NRBC 0.0 0  WBC    ABSOLUTE NRBC 0.00 0.00 - 0.01 K/uL    NEUTROPHILS 81 (H) 32 - 75 %    LYMPHOCYTES 10 (L) 12 - 49 %    MONOCYTES 9 5 - 13 %    EOSINOPHILS 0 0 - 7 %    BASOPHILS 0 0 - 1 %    IMMATURE GRANULOCYTES 0 0.0 - 0.5 %    ABS. NEUTROPHILS 8.3 (H) 1.8 - 8.0 K/UL    ABS. LYMPHOCYTES 1.0 0.8 - 3.5 K/UL    ABS.  MONOCYTES 0.9 0.0 - 1.0 K/UL    ABS. EOSINOPHILS 0.0 0.0 - 0.4 K/UL    ABS. BASOPHILS 0.0 0.0 - 0.1 K/UL    ABS. IMM. GRANS. 0.0 0.00 - 0.04 K/UL    DF AUTOMATED     METABOLIC PANEL, COMPREHENSIVE    Collection Time: 10/12/21 10:08 AM   Result Value Ref Range    Sodium 141 136 - 145 mmol/L    Potassium 4.5 3.5 - 5.1 mmol/L    Chloride 110 (H) 97 - 108 mmol/L    CO2 26 21 - 32 mmol/L    Anion gap 5 5 - 15 mmol/L    Glucose 110 (H) 65 - 100 mg/dL    BUN 24 (H) 6 - 20 MG/DL    Creatinine 1.77 (H) 0.70 - 1.30 MG/DL    BUN/Creatinine ratio 14 12 - 20      GFR est AA 45 (L) >60 ml/min/1.73m2    GFR est non-AA 37 (L) >60 ml/min/1.73m2    Calcium 9.3 8.5 - 10.1 MG/DL    Bilirubin, total 1.3 (H) 0.2 - 1.0 MG/DL    ALT (SGPT) 34 12 - 78 U/L    AST (SGOT) 31 15 - 37 U/L    Alk.  phosphatase 82 45 - 117 U/L    Protein, total 7.2 6.4 - 8.2 g/dL    Albumin 3.3 (L) 3.5 - 5.0 g/dL    Globulin 3.9 2.0 - 4.0 g/dL    A-G Ratio 0.8 (L) 1.1 - 2.2     URINALYSIS W/ REFLEX CULTURE    Collection Time: 10/12/21 10:50 AM    Specimen: Urine   Result Value Ref Range    Color YELLOW/STRAW      Appearance CLEAR CLEAR      Specific gravity 1.014 1.003 - 1.030      pH (UA) 6.0 5.0 - 8.0      Protein Negative NEG mg/dL    Glucose Negative NEG mg/dL    Ketone TRACE (A) NEG mg/dL    Bilirubin Negative NEG      Blood Negative NEG      Urobilinogen 1.0 0.2 - 1.0 EU/dL    Nitrites Negative NEG      Leukocyte Esterase Negative NEG      WBC 0-4 0 - 4 /hpf    RBC 0-5 0 - 5 /hpf    Epithelial cells FEW FEW /lpf    Bacteria Negative NEG /hpf    UA:UC IF INDICATED CULTURE NOT INDICATED BY UA RESULT CNI      Hyaline cast 0-2 0 - 5 /lpf   EKG, 12 LEAD, INITIAL    Collection Time: 10/12/21 10:59 AM   Result Value Ref Range    Ventricular Rate 54 BPM    Atrial Rate 54 BPM    P-R Interval 140 ms    QRS Duration 88 ms    Q-T Interval 448 ms    QTC Calculation (Bezet) 424 ms    Calculated P Axis 23 degrees    Calculated R Axis 41 degrees    Calculated T Axis 71 degrees Diagnosis       Sinus bradycardia  Nonspecific T wave abnormality    Confirmed by Gerry Caicedo ([id number]) on 10/12/2021 5:27:31 PM     LIPASE    Collection Time: 10/12/21 11:08 AM   Result Value Ref Range    Lipase 115 73 - 393 U/L   TROPONIN-HIGH SENSITIVITY    Collection Time: 10/12/21 11:08 AM   Result Value Ref Range    Troponin-High Sensitivity 5 0 - 76 ng/L       Radiologic Studies -   CT ABD PELV W CONT   Final Result   1. Mid abdominal internal hernia with significant mass effect on the superior   mesenteric vein and possibly the superior mesenteric artery at this level. More   distal superior mesenteric veins are enlarged, edematous and hypodense,   suggesting a lack of adequate perfusion. Recommend emergent surgical   consultation. 2. Several nonspecific sclerotic osseous foci, as described above. The patient's   clinical history of prostate CA, recommend correlation with appropriate clinical   parameters and consider bone scan or MR for further evaluation. Findings were communicated verbally to the ED at the time of interpretation. CT Results  (Last 48 hours)               10/12/21 1138  CT ABD PELV W CONT Final result    Impression:  1. Mid abdominal internal hernia with significant mass effect on the superior   mesenteric vein and possibly the superior mesenteric artery at this level. More   distal superior mesenteric veins are enlarged, edematous and hypodense,   suggesting a lack of adequate perfusion. Recommend emergent surgical   consultation. 2. Several nonspecific sclerotic osseous foci, as described above. The patient's   clinical history of prostate CA, recommend correlation with appropriate clinical   parameters and consider bone scan or MR for further evaluation. Findings were communicated verbally to the ED at the time of interpretation. Narrative:  INDICATION: Suprapubic abdominal pain. CT of the abdomen and pelvis is performed with 5 mm collimation.  Study is   performed with 100 cc of nonionic Isovue 370. Sagittal and coronal reformatted   images were also performed. CT dose reduction was achieved with the use of the standardized protocol   tailored for this examination and automatic exposure control for dose   modulation. There is no prior study for direct comparison. Findings:       Lung bases: The visualized lung bases are clear. Liver: The liver is normal.       Adrenals: Adrenal glands are normal.       Pancreas: The pancreas is normal.       Gallbladder: The gallbladder is normal.       Kidneys: There is a 2.3 cm left renal cyst. There are also several bilateral   subcentimeter renal hypodensities, too small to further characterize, but   statistically likely to represent cysts. There is a subcentimeter left renal   hypodensity with peripheral calcifications, also likely representing cysts. Kidneys otherwise enhance and excrete promptly and symmetrically. There is no   hydronephrosis. Spleen: The spleen is normal.       Lymph nodes. There is no ashwini hepatitis, mesenteric, retroperitoneal or pelvic   lymphadenopathy. Bowel: Within the mid abdomen there appears to be an internal hernia with   whirling of the mesentery and significant extrinsic mass effect on the superior   mesenteric vein at this level; the more distal mesenteric veins are edematous   demonstrate hypoenhancement suggesting significantly diminished or absent flow. There also may be extrinsic mass effect on the superior mesenteric artery at   this level which is moderately narrowed, however this is difficult to evaluate   due to associated atherosclerotic plaque within the superior mesenteric artery   at this level and it should be noted that a dedicated CTA was not performed.    While there are no dilated loops of small or large bowel, one of the small bowel   loops within the left lower quadrant of the abdomen may be thickened There is a   small amount of free fluid in the abdomen and pelvis. There is no free   intraperitoneal gas. There is no focal fluid collection to suggest abscess. Appendix: The appendix is normal.       Urinary bladder: Urinary bladder is partially filled and grossly normal.       Bones: There are several sclerotic foci within the lower thoracic and lumbar   spine as well as the pelvic bones. Osseous structures are otherwise diffusely   demineralized. There is no acute fracture or subluxation. Miscellaneous: There is a 9.6 cm x 5.9 cm right inguinal hernia containing small   bowel loops and fluid; the small bowel loops entering and exiting the right   inguinal hernia are nondilated. There is a small left inguinal hernia containing   fat and fluid. CXR Results  (Last 48 hours)    None            Medical Decision Making   I am the first provider for this patient. I reviewed the vital signs, available nursing notes, past medical history, past surgical history, family history and social history. Vital Signs-Reviewed the patient's vital signs.   Patient Vitals for the past 12 hrs:   Temp Pulse Resp BP SpO2   10/12/21 1820 98.1 °F (36.7 °C) 68 18 (!) 151/79 100 %   10/12/21 1800 98.1 °F (36.7 °C) 64 16 134/63 99 %   10/12/21 1730  67 14 (!) 140/64 100 %   10/12/21 1700  67 17 (!) 148/70 100 %   10/12/21 1645  67 15 128/70 99 %   10/12/21 1630  72 12 108/66 98 %   10/12/21 1615  71 16 (!) 141/78 100 %   10/12/21 1600  71 19 (!) 145/70 100 %   10/12/21 1545  72 17 (!) 156/69 100 %   10/12/21 1530  64 16 (!) 146/49 100 %   10/12/21 1515  (!) 58 15 132/88 100 %   10/12/21 1505  (!) 59 19 (!) 137/54 100 %   10/12/21 1500  (!) 54 15  100 %   10/12/21 1455  (!) 57 14 (!) 114/51 100 %   10/12/21 1450  (!) 54 15 (!) 112/34 99 %   10/12/21 1448 97.7 °F (36.5 °C) (!) 56 10 124/76 100 %   10/12/21 1445  (!) 54 14 (!) 123/57 100 %   10/12/21 1442 97.7 °F (36.5 °C) (!) 54 15 (!) 118/48 100 %   10/12/21 1316 99.5 °F (37.5 °C) 61 20 139/73 100 %   10/12/21 1004 97.7 °F (36.5 °C) (!) 59 16 125/72 100 %       ED EKG interpretation:  Rhythm: sinus bradycardia; and regular . Rate (approx.): 54; Axis: normal; P wave: normal; QRS interval: normal ; ST/T wave: normal; in  Lead: all; This EKG was interpreted by Keara Gonzalez MD,ED Provider. Records Reviewed: Nursing Notes and Old Medical Records    Provider Notes (Medical Decision Making):   Lucy Medina is a 81-year-old male with history as mentioned above who is presenting to the emergency department with abdominal pain since yesterday. He is hemodynamically stable and nontoxic on exam. Abd appears benign. Ordered lab work-up including troponin, and CT abdomen pelvis. Treated nausea with Zofran. Dispo pending workup. ED Course and Progress Notes:   Initial assessment performed. The patients presenting problems have been discussed, and they are in agreement with the care plan formulated and outlined with them. I have encouraged them to ask questions as they arise throughout their visit. ED Course as of Oct 12 1844   Tue Oct 12, 2021   1214 Findings of mesenteric vein compression on CT abd. Surgery contacted for recs. Patient and family informed of diagnosis. [AC]   7979 Pt sent to preop for surgical management.     [AC]      ED Course User Index  [AC] Viola Jorge MD       Diagnosis     Clinical Impression:   1. Abdominal hernia without obstruction and without gangrene, recurrence not specified, unspecified hernia type    2. Acute occlusion of mesenteric vein (HCC)        Disposition:  Taken to OR. Patient and family counseled on results of workup and treatment plan.         Resident Signature:   Signed By: Keara Gonzalez MD     October 12, 2021

## 2021-10-12 NOTE — ANESTHESIA POSTPROCEDURE EVALUATION
Procedure(s):  LAPAROTOMY,  LYSIS OF ADHESIONS. general    Anesthesia Post Evaluation      Multimodal analgesia: multimodal analgesia used between 6 hours prior to anesthesia start to PACU discharge  Patient location during evaluation: bedside  Patient participation: complete - patient participated  Level of consciousness: awake  Pain management: adequate  Airway patency: patent  Anesthetic complications: no  Cardiovascular status: acceptable  Respiratory status: acceptable  Hydration status: acceptable  Post anesthesia nausea and vomiting:  controlled  Final Post Anesthesia Temperature Assessment:  Normothermia (36.0-37.5 degrees C)      INITIAL Post-op Vital signs:   Vitals Value Taken Time   /70 10/12/21 1600   Temp 36.5 °C (97.7 °F) 10/12/21 1448   Pulse 73 10/12/21 1604   Resp 14 10/12/21 1604   SpO2 100 % 10/12/21 1604   Vitals shown include unvalidated device data.

## 2021-10-12 NOTE — BRIEF OP NOTE
Brief Postoperative Note    Patient: Mino Cruz  YOB: 1940  MRN: [id number]    Date of Procedure: 10/12/2021     Pre-Op Diagnosis: ischemic bowel    Post-Op Diagnosis: Adhesive SBO causing reversible ischemia       Procedure(s):  LAPAROTOMY,  LYSIS OF ADHESIONS    Surgeon(s):  Samantha Dickerson MD    Surgical Assistant: Surg Asst-1: Jone Lee    Anesthesia: General     Estimated Blood Loss (mL): less than 50     Complications: None    Specimens: * No specimens in log *     Implants: * No implants in log *    Drains: * No LDAs found *    Findings: 1) small bowel twisted around an an omental adhesive band 2) small bowel purple at first but became pink with peristalsis on reduction      Electronically Signed by Wolf Canas MD on 10/12/2021 at 2:38 PM

## 2021-10-12 NOTE — PROGRESS NOTES
End of Shift Note    Bedside shift change report given to Mallika Kendrick RN (oncoming nurse) by Maye Elizalde LPN (offgoing nurse). Report included the following information ED Summary, Intake/Output, MAR and Recent Results    Shift worked:  7am-7pm     Shift summary and any significant changes:     Post op plan of care, surgical dressing, diet/clears, pain medications, IV therapy     Concerns for physician to address:  Continue post op plan of care     Zone phone for oncoming shift:  [phone]       Activity:  Activity Level: Up with Assistance  Number times ambulated in hallways past shift: 0  Number of times OOB to chair past shift: 0    Cardiac:   Cardiac Monitoring: No      Cardiac Rhythm: Sinus Rhythm    Access:   Current line(s): PIV     Genitourinary:   Urinary status: orourke    Respiratory:   O2 Device: None (Room air)  Chronic home O2 use?: NO  Incentive spirometer at bedside: N/A     GI:  Last Bowel Movement Date: 10/11/21  Current diet:  DIET NPO Sips of Clear Liquids, Sips of Water with Meds, Ice Chips, Popsicles  Passing flatus: NO  Tolerating current diet: YES       Pain Management:   Patient states pain is manageable on current regimen: YES    Skin:  Demian Score: 20  Interventions: float heels and internal/external urinary devices    Patient Safety:  Fall Score:  Total Score: 3  Interventions: bed/chair alarm  High Fall Risk: Yes    Length of Stay:  Expected LOS: - - -  Actual LOS: 0      Maye Elizalde LPN

## 2021-10-12 NOTE — PERIOP NOTES
TRANSFER - IN REPORT:    Verbal report received from Mikayla RN(name) on [place]  being received from ED(unit) for ordered procedure      Report consisted of patients Situation, Background, Assessment and   Recommendations(SBAR). Information from the following report(s) SBAR, Kardex, Intake/Output, MAR and Recent Results was reviewed with the receiving nurse. Opportunity for questions and clarification was provided. Assessment completed upon patients arrival to unit and care assumed.

## 2021-10-12 NOTE — ANESTHESIA PREPROCEDURE EVALUATION
Relevant Problems   No relevant active problems       Anesthetic History   No history of anesthetic complications            Review of Systems / Medical History  Patient summary reviewed, nursing notes reviewed and pertinent labs reviewed    Pulmonary  Within defined limits                 Neuro/Psych   Within defined limits           Cardiovascular    Hypertension          Hyperlipidemia    Exercise tolerance: >4 METS     GI/Hepatic/Renal  Within defined limits              Endo/Other        Arthritis     Other Findings              Physical Exam    Airway  Mallampati: II  TM Distance: 4 - 6 cm  Neck ROM: normal range of motion   Mouth opening: Normal     Cardiovascular  Regular rate and rhythm,  S1 and S2 normal,  no murmur, click, rub, or gallop  Rhythm: regular  Rate: normal         Dental  No notable dental hx       Pulmonary  Breath sounds clear to auscultation               Abdominal  GI exam deferred       Other Findings            Anesthetic Plan    ASA: 2  Anesthesia type: general    Monitoring Plan: BIS      Induction: Intravenous  Anesthetic plan and risks discussed with: Patient
Breath Sounds equal & clear to percussion & auscultation, no accessory muscle use

## 2021-10-12 NOTE — PROGRESS NOTES
Admission assessment completed by this RN upon arrival to surgical-telemetry unit. Care plan and education initiated. Marie Joyner LPN to assume care of patient at this time.        Guy Cedeño

## 2021-10-12 NOTE — PERIOP NOTES
1442-Handoff Report from Operating Room to PACU    Report received from [facility] and Dr. Guerline Koch regarding Ashley Hendrickson. Surgeon(s):  Sukhwinder Putnam MD  And Procedure(s) (LRB):  LAPAROTOMY,  LYSIS OF ADHESIONS (N/A)  confirmed   with allergies, drains and dressings discussed. Anesthesia type, drugs, patient history, complications, estimated blood loss, vital signs, intake and output, and last pain medication, lines, reversal medications and temperature were reviewed. 1615- Called the number for pt's contact Abdulkadir Fried. No answer received and voicemail box was full. 1645- Pt's granddaughter arrived to the surgery center. Update provided. 1800-TRANSFER - OUT REPORT:    Verbal report given to [facility]) on Ashley Hendrickson  being transferred to surg tele(unit) for routine post - op       Report consisted of patients Situation, Background, Assessment and   Recommendations(SBAR). Information from the following report(s) SBAR, Kardex, OR Summary, Procedure Summary, Intake/Output, MAR and Recent Results was reviewed with the receiving nurse. Opportunity for questions and clarification was provided.       Patient transported with:   [employer]

## 2021-10-12 NOTE — PROGRESS NOTES
Attempted to call patient's son Demetrius Hunt at [phone] form the OR immediated preceding incision and then again post-op. There was no answer and his voicemail is full. No family has checked in at the .

## 2021-10-13 LAB
ANION GAP SERPL CALC-SCNC: 4 MMOL/L (ref 5–15)
BUN SERPL-MCNC: 25 MG/DL (ref 6–20)
BUN/CREAT SERPL: 14 (ref 12–20)
CALCIUM SERPL-MCNC: 8.5 MG/DL (ref 8.5–10.1)
CHLORIDE SERPL-SCNC: 112 MMOL/L (ref 97–108)
CO2 SERPL-SCNC: 26 MMOL/L (ref 21–32)
CREAT SERPL-MCNC: 1.8 MG/DL (ref 0.7–1.3)
ERYTHROCYTE [DISTWIDTH] IN BLOOD BY AUTOMATED COUNT: 13.1 % (ref 11.5–14.5)
GLUCOSE SERPL-MCNC: 144 MG/DL (ref 65–100)
HCT VFR BLD AUTO: 35.1 % (ref 36.6–50.3)
HGB BLD-MCNC: 11.7 G/DL (ref 12.1–17)
MCH RBC QN AUTO: 27.8 PG (ref 26–34)
MCHC RBC AUTO-ENTMCNC: 33.3 G/DL (ref 30–36.5)
MCV RBC AUTO: 83.4 FL (ref 80–99)
NRBC # BLD: 0 K/UL (ref 0–0.01)
NRBC BLD-RTO: 0 PER 100 WBC
PLATELET # BLD AUTO: 194 K/UL (ref 150–400)
PMV BLD AUTO: 10.2 FL (ref 8.9–12.9)
POTASSIUM SERPL-SCNC: 4.1 MMOL/L (ref 3.5–5.1)
RBC # BLD AUTO: 4.21 M/UL (ref 4.1–5.7)
SODIUM SERPL-SCNC: 142 MMOL/L (ref 136–145)
WBC # BLD AUTO: 15.9 K/UL (ref 4.1–11.1)

## 2021-10-13 PROCEDURE — 97161 PT EVAL LOW COMPLEX 20 MIN: CPT

## 2021-10-13 PROCEDURE — 85027 COMPLETE CBC AUTOMATED: CPT

## 2021-10-13 PROCEDURE — 74011250637 HC RX REV CODE- 250/637: Performed by: SURGERY

## 2021-10-13 PROCEDURE — 74011250637 HC RX REV CODE- 250/637: Performed by: NURSE PRACTITIONER

## 2021-10-13 PROCEDURE — 80048 BASIC METABOLIC PNL TOTAL CA: CPT

## 2021-10-13 PROCEDURE — 0DN80ZZ RELEASE SMALL INTESTINE, OPEN APPROACH: ICD-10-PCS | Performed by: SURGERY

## 2021-10-13 PROCEDURE — 74011250636 HC RX REV CODE- 250/636: Performed by: SURGERY

## 2021-10-13 PROCEDURE — 65270000029 HC RM PRIVATE

## 2021-10-13 PROCEDURE — 36415 COLL VENOUS BLD VENIPUNCTURE: CPT

## 2021-10-13 RX ORDER — HYDROCHLOROTHIAZIDE 25 MG/1
25 TABLET ORAL DAILY
Status: DISCONTINUED | OUTPATIENT
Start: 2021-10-13 | End: 2021-10-15 | Stop reason: HOSPADM

## 2021-10-13 RX ORDER — GUAIFENESIN 100 MG/5ML
81 LIQUID (ML) ORAL DAILY
COMMUNITY

## 2021-10-13 RX ORDER — LISINOPRIL 20 MG/1
20 TABLET ORAL DAILY
Status: DISCONTINUED | OUTPATIENT
Start: 2021-10-13 | End: 2021-10-15 | Stop reason: HOSPADM

## 2021-10-13 RX ORDER — MELATONIN
1000 DAILY
COMMUNITY

## 2021-10-13 RX ORDER — ATORVASTATIN CALCIUM 40 MG/1
40 TABLET, FILM COATED ORAL DAILY
COMMUNITY

## 2021-10-13 RX ORDER — LISINOPRIL AND HYDROCHLOROTHIAZIDE 20; 25 MG/1; MG/1
1 TABLET ORAL DAILY
COMMUNITY

## 2021-10-13 RX ADMIN — Medication 10 ML: at 06:08

## 2021-10-13 RX ADMIN — POTASSIUM CHLORIDE, DEXTROSE MONOHYDRATE AND SODIUM CHLORIDE 125 ML/HR: 150; 5; 450 INJECTION, SOLUTION INTRAVENOUS at 02:54

## 2021-10-13 RX ADMIN — ACETAMINOPHEN 650 MG: 325 TABLET ORAL at 15:38

## 2021-10-13 RX ADMIN — ENOXAPARIN SODIUM 80 MG: 80 INJECTION SUBCUTANEOUS at 21:46

## 2021-10-13 RX ADMIN — Medication 10 ML: at 21:46

## 2021-10-13 RX ADMIN — POTASSIUM CHLORIDE, DEXTROSE MONOHYDRATE AND SODIUM CHLORIDE 125 ML/HR: 150; 5; 450 INJECTION, SOLUTION INTRAVENOUS at 15:40

## 2021-10-13 RX ADMIN — LISINOPRIL 20 MG: 20 TABLET ORAL at 11:07

## 2021-10-13 RX ADMIN — HYDROCHLOROTHIAZIDE 25 MG: 25 TABLET ORAL at 11:07

## 2021-10-13 RX ADMIN — Medication 10 ML: at 15:40

## 2021-10-13 RX ADMIN — ENOXAPARIN SODIUM 80 MG: 80 INJECTION SUBCUTANEOUS at 11:07

## 2021-10-13 NOTE — PROGRESS NOTES
Transition of Care Plan:    RUR: 12% \"low risk\"  Disposition: Home w/ follow-up appts  Follow up appointments: PCP & specialists as indicated  DME needed: None  Transportation at Discharge: Family  [place] or means to access home:  Yes   Medicare Letter: Needed at d/c  Is patient a BCPI-A Bundle:  No         If yes, was Bundle Letter given?: N/A    Caregiver Contact: Henri Dial, [phone]  Discharge Caregiver contacted prior to discharge? CM reviewed pt's chart. CM met w/ pt & his granddtr at bedside to introduce role & complete initial assessment. Pt [place] but still actively participated in assessment. Pt confirmed demographic information is up to date. Pt lives w/ his son in single level/ no FRANKLIN house. Physical address: [address] [place] [address], [address] [place] [address]. Reports being independent w/ ADLs/ IADLs & drives at baseline. Pt appears to have plenty of suportive family members outside the home. No use of DME or home O2. [facility]; pt could benefit from PT eval. Pt uses the [facility] in [facility]. Family to transport at d/c. Will continue to follow. Reason for Admission:  SBO (small bowel obstruction) (HCC)                   RUR Score:       12%              Plan for utilizing home health:      Could benefit from PT eval    PCP: First and Last name:  Andreia, Not On File, MD   Name of Practice: [facility]   Are you a current patient: Yes/No: Yes   Approximate date of last visit: Within 6 months   Can you participate in a virtual visit with your PCP:                     Current Advanced Directive/Advance Care Plan: Full Code  [address] (ACP) Conversation  Date of Conversation: 10/13/2021  Conducted with: Patient with Decision Making Capacity    Healthcare Decision Maker:   No healthcare decision makers have been documented.    Click here to complete [employer] including selection of the Healthcare Decision Maker Relationship (ie \"Primary\")    Today we discussed Healthcare Decision Makers. The patient is considering options. Content/Action Overview:   Has NO ACP documents/care preferences - information provided, considering goals and options  Reviewed DNR/DNI and patient elects Full Code (Attempt Resuscitation)    Length of Voluntary ACP Conversation in minutes:  <16 minutes (Non-Billable)    [address] Management Interventions  PCP Verified by CM: Yes  Palliative Care Criteria Met (RRAT>21 & CHF Dx)?: No  Mode of Transport at Discharge:  Other (see comment) (Family)  Transition of Care Consult (CM Consult): Discharge Planning  Discharge Durable Medical Equipment: No  Physical Therapy Consult: No (could benefit)  Occupational Therapy Consult: No  Speech Therapy Consult: No  Support Systems: Child(dong), Other Family Member(s)  Confirm Follow Up Transport: Self  The Plan for Transition of Care is Related to the Following Treatment Goals : Home w/ follow-up appts  Discharge Location  Discharge Placement: Home with family assistance (& follow-up appts)    ELIA Briones  Care Management

## 2021-10-13 NOTE — OP NOTES
[address]  OPERATIVE REPORT    Name:  Leeanna Romano  MR#:  [id number]  :  1940  ACCOUNT #:  [de-identified]  DATE OF SERVICE:  10/12/2021    PREOPERATIVE DIAGNOSES:  1.  Small bowel obstruction. 2.  Mesenteric ischemia. 3.  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 POSTOPERATIVE DIAGNOSES:  1.  Small bowel obstruction. 2.  Mesenteric ischemia. 3.  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 PROCEDURE PERFORMED:  Lysis of adhesions. SURGEON:  Chela Bhatti MD    ASSISTANT:  Isac Gavin. ANESTHESIA:  General endotracheal.    COMPLICATIONS:  None. SPECIMENS REMOVED:  None. IMPLANTS:  None. ESTIMATED BLOOD LOSS:  Less than 50 mL. DRAINS:  None. FINDINGS:  1.  Small bowel volvulus twisted around an omental adhesive band. 2.  Small bowel appeared purple and ischemic initially, but became pink with visible peristalsis upon reduction and on twisting of the volvulus. INDICATIONS FOR PROCEDURE:  The patient is an 26-year-old man who presented to the emergency department with acute on chronic abdominal pain. He is hemodynamically stable and labs were essentially normal, but CT abdomen and pelvis revealed a possible internal hernia causing small bowel obstruction, mesenteric ischemia and occlusion of the superior mesenteric vein, so the patient is taken to the operating room on an urgent basis for exploration. DESCRIPTION OF PROCEDURE:  The patient was identified in the preoperative holding area, informed consent was obtained. He was given preoperative antibiotics. He was then taken to the operating room, placed in a supine position, underwent general endotracheal anesthesia without complication. A Rojas catheter was then inserted under sterile technique. His abdomen was then prepped and draped in usual sterile fashion. A time-out was performed to confirm that the patient by name and that he was presenting for laparotomy.   Once this was confirmed, a midline incision was made starting 4 cm below his xiphoid and extending to 4 cm above his pubic symphysis. Electrocautery was used to dissect through the subcutaneous tissue. The linea alba was divided with electrocautery. The omentum was elevated between two DeBakey forceps and was entered with Metzenbaum scissors. Once entry into the abdomen was obtained, a surgeon's finger was inserted and the remainder of the peritoneum opened with electrocautery over the surgeon's finger to protect the underlying viscera. Upon entry into the abdominal cavity, the small bowel had a dilated purple in appearance. A wound protector was inserted. Once the wound protector was inserted, the mass of small bowel was eviscerated. The small bowel was found to be volvulized along its mesentery with a thick omental band wrapped several times around this volvulized segment. Therefore, the omentum was divided with a Harmonic scalpel and then the omentum untwisted in a counterclockwise direction. Once the omental band was removed, the bowel could be untwisted. The bowel immediately became pink. The small bowel was then run from the ligament of Treitz to the ileocecal valve. There were no signs of any other adhesive processes. There were no masses and the bowel became pink and demonstrated peristalsis. The insertion point of the omentum was found in the mesentery of the distal ileum and this band of omentum was removed at this location using the energy device. The abdomen was then irrigated with Irrisept irrigation solution. After allowing it to dwell for approximately 5 minutes, it was aspirated and the abdomen irrigated with normal saline. The small bowel was checked once again to ensure its viability and the patient was found to have pink, healthy looking bowel with good peristalsis. The omentum was then placed over the exposed bowel.   Two sheets of Seprafilm were placed over the bowel and the midline, and then the midline fascia closed with a running Stratafix symmetric suture. The subcutaneous tissue was irrigated with Irrisept and the skin closed with staples. The patient tolerated the procedure well. There were no apparent complications. Instrument and sponge counts correct x2.         MD JEFFREY [id number]  D:  10/13/2021 10:58  T:  10/13/2021 12:20  JOB #:  [id number]

## 2021-10-13 NOTE — PROGRESS NOTES
End of Shift Note    Bedside shift change report given to Kaushal Lu (oncoming nurse) by Jordy Fregoso (offgoing nurse). Report included the following information SBAR, Kardex, Intake/Output, MAR and Recent Results    Shift worked: 2042-7144     Shift summary and any significant changes:     Patient rested well. No c/o of pain or discomfort noted. Midline incision  dressing is intact. Orourke is patent and draining well. No other issues. Patient is up to the chair. Concerns for physician to address:       Zone phone for oncoming shift:   [phone]       Activity:  Activity Level: Up with Assistance  Number times ambulated in hallways past shift: 0  Number of times OOB to chair past shift: 0    Cardiac:   Cardiac Monitoring: No      Cardiac Rhythm: Sinus Rhythm    Access:   Current line(s): PIV     Genitourinary:   Urinary status: orourke    Respiratory:   O2 Device: None (Room air)  Chronic home O2 use?: N/A  Incentive spirometer at bedside: YES     GI:  Last Bowel Movement Date: 10/11/21  Current diet:  DIET NPO Sips of Clear Liquids, Sips of Water with Meds, Ice Chips, Popsicles  Passing flatus: YES  Tolerating current diet: YES       Pain Management:   Patient states pain is manageable on current regimen: YES    Skin:  Demian Score: 20  Interventions: increase time out of bed, foam dressing and nutritional support     Patient Safety:  Fall Score:  Total Score: 3  Interventions: assistive device (walker, cane, etc), gripper socks and pt to call before getting OOB  High Fall Risk: Yes    Length of Stay:  Expected LOS: - - -  Actual LOS: Chan 1466 LPN

## 2021-10-13 NOTE — PROGRESS NOTES
Pharmacy Medication Reconciliation     The patient was interviewed regarding current PTA medication list, use and drug allergies;  family present in room and obtained permission from patient to discuss drug regimen with visitor(s) present. The patient was questioned regarding use of any other inhalers, topical products, over the counter medications, herbal medications, vitamin products or ophthalmic/nasal/otic medication use. Allergy Update: Patient has no known allergies. Recommendations/Findings: The following amendments were made to the patient's active medication list on file at [facility]:   1) Additions: aspirin, atorvastatin, cholecalciferol    2) Deletions: simvastatin, metoprolol    3) Changes: lisinopril/HCTZ     PTA medication list was corrected to the following:     Prior to Admission Medications   Prescriptions Last Dose Informant Taking?   aspirin 81 mg chewable tablet  Self Yes   Sig: Take 81 mg by mouth daily. atorvastatin (LIPITOR) 40 mg tablet  Self Yes   Sig: Take 40 mg by mouth daily. cholecalciferol (VITAMIN D3) (1000 Units /25 mcg) tablet  Self Yes   Sig: Take 1,000 Units by mouth daily. lisinopril-hydroCHLOROthiazide (PRINZIDE, ZESTORETIC) 20-25 mg per tablet  Self Yes   Sig: Take 1 Tablet by mouth daily.       Facility-Administered Medications: None        Thank you,  Tor Rod, HUMAD

## 2021-10-13 NOTE — PROGRESS NOTES
Comprehensive Nutrition Assessment    Type and Reason for Visit: Initial, Positive nutrition screen    Nutrition Recommendations/Plan:   · Advance diet beyond Clears when medically able   · Added apple Ensure Clear once a day   · Please document % meals and supplements consumed in flowsheet I/O's under intake     Nutrition Assessment:Chart reviewed due to automatic trigger to nutrition services. Pt presents with SBO and mesenteric ischemia. A lysis of adhesion and laparotomy was conducted on 10/12. Pt visited at bedside and reports a good appetite; he consume % per documentation. A UBW of 170 lbs was reported. Pt expressed interest in adding a ONS into his regimen to better meet protein needs; an apple Ensure Clear will be added once a day. Diet will be advanced beyond clears as medically able and hospitalist discretion. Pt did not have any further nutrition questions or concerns at this time. Patient Vitals for the past 168 hrs:   % Diet Eaten   10/13/21 0739 76 - 100%     Last Weight Metric  Weight Loss Metrics 10/12/2021 7/27/2021 9/12/2012 10/25/2011 10/3/2011   Today's Wt 170 lb 173 lb 11.6 oz 247 lb 237 lb 237 lb   BMI 26.63 kg/m2 28.04 kg/m2 35.44 kg/m2 35.51 kg/m2 35.51 kg/m2     Estimated Daily Nutrient Needs:  Energy (kcal): 1872 kcal (1440 × 1.2 AF); Weight Used for Energy Requirements: Current  Protein (g): 77-92 g (1.0-1.2 g/kg BW); Weight Used for Protein Requirements: Current  Fluid (ml/day): 1800 mL/day; Method Used for Fluid Requirements: 1 ml/kcal    Nutrition Related Findings:  Labs: reviewed. Meds: reviewed. BM: 10/11      Wounds:    Surgical incision       Current Nutrition Therapies:  ADULT DIET Clear Liquid    Anthropometric Measures:  · Height:  5' 7\" (170.2 cm)  · Current Body Wt:  77.1 kg (169 lb 15.6 oz)   · BMI Category: Overweight (BMI 25.0-29. 9)       Nutrition Diagnosis:   · Inadequate protein-energy intake related to  (lysis of adhesion and lapartomy procedure) as evidenced by NPO or clear liquid status due to medical condition    Nutrition Interventions:   Food and/or Nutrient Delivery: Continue current diet  Nutrition Education and Counseling: No recommendations at this time  Coordination of Nutrition Care: Continue to monitor while inpatient    Goals:  Pt will consume >75% of meals and ONS next 3-5 days       Nutrition Monitoring and Evaluation:   Behavioral-Environmental Outcomes: None identified  Food/Nutrient Intake Outcomes: Food and nutrient intake, Supplement intake, Diet advancement/tolerance  Physical Signs/Symptoms Outcomes: Biochemical data, GI status, Weight, Skin    Discharge Planning:     Too soon to determine     Electronically signed by Jake Zee on 10/13/2021 at 1:23 PM

## 2021-10-13 NOTE — PROGRESS NOTES
Surgery NP Progress Note    Rachele Velez  [id number]  male  [de-identified] y.o.  1940    s/p LAPAROTOMY,  LYSIS OF ADHESIONS on 10/12/2021   Pt seen with Dr. Bernard Galo      Assessment:   Active Problems:    SBO (small bowel obstruction) ([facility] [address] (10/12/2021)      Mesenteric ischemia ([facility] [address] (10/12/2021)        Expected post-op progress. Plan/Recommendations/Medical Decision Making:     - Mobilize with nursing and OOB to chair for meals  - Liquids and advance as tolerated. - Pain management- Continue current pain control methods.   - VTE Prophylaxis: Lovenox therapeutic for two days due to concern for impaired vascular flow to the mesentery. - Discharge planning, possibly tomorrow if tolerating diet and mobilizing well. Subjective:     Patient has no complaints. Feels much better than pre-op. Pain control adequate. No nausea/vomiting. Negative flatus. Negative stool output. Objective:     Blood pressure 115/71, pulse 77, temperature 99.1 °F (37.3 °C), resp. rate 18, height 5' 7\" (1.702 m), weight 77.1 kg (170 lb), SpO2 97 %. Temp (24hrs), Av.4 °F (36.9 °C), Min:97.7 °F (36.5 °C), Max:99.5 °F (37.5 °C)    Pt resting in chair. NAD   Incisions CDI. Abd soft and appropriately tender. SCDs for mechanical DVT proph while in bed     Body mass index is 26.63 kg/m². Reference: BMI greater than 30 is classified as obesity and greater than 40 is classified as morbid obesity.      Last 3 Recorded Weights in this Encounter    10/12/21 1004   Weight: 77.1 kg (170 lb)         Edwin Crawford NP   MSN, APRN, FNP-C, CWOCN-AP    10/13/21

## 2021-10-13 NOTE — PROGRESS NOTES
PHYSICAL THERAPY EVALUATION/DISCHARGE  Patient: Berta Arias (33 y.o. male)  Date: 10/13/2021  Primary Diagnosis: SBO (small bowel obstruction) (HCC) [K56.609]  Procedure(s) (LRB):  LAPAROTOMY,  LYSIS OF ADHESIONS (N/A) 1 Day Post-Op   Precautions:   Fall      ASSESSMENT  Nurse clears pt for mobility. Pt agreeable to PT eval. Session. Based on the objective data described below, the patient presents with baseline functional mobility without device use or LOB this date, mild lateral sway with wide RADHA (anticipate this is baseline for pt). Pt has supportive son/family in the home available to assist as needed. No further acute care skilled PT needs are identified at this time, will sign off. Functional Outcome Measure: The patient scored 22/28 on the Tinetti outcome measure which is indicative of moderate fall risk. Other factors to consider for discharge: pt has supportive son/family able to provide assist as needed upon discharge      Further skilled acute physical therapy is not indicated at this time. PLAN :  Recommendation for discharge: (in order for the patient to meet his/her long term goals)  No skilled physical therapy/ follow up rehabilitation needs identified at this time. This discharge recommendation:  Has been made in collaboration with the attending provider and/or case management    IF patient discharges home will need the following DME: none       SUBJECTIVE:   Patient stated I feel pretty good.     OBJECTIVE DATA SUMMARY:   HISTORY:    Past Medical History:   Diagnosis Date    Arthritis     Cancer ([facility] [address]     PROSTATE    Hypertension     Other ill-defined conditions(799.89)     elevated cholesterol     Past Surgical History:   Procedure Laterality Date    COLONOSCOPY,DIAGNOSTIC  9/12/2012         HX ORTHOPAEDIC  2000    L SHOULDER-UNKNOWN PROCEDURE    HX PROSTATECTOMY      NC PROSTATE BIOPSY, NEEDLE, SATURATION SAMPLING         Prior level of function: mod.  I with all ADL's, amb. Efforts prior to admission, no device use, still driving per pt report  Personal factors and/or comorbidities impacting plan of care:     Home Situation  Home Environment: Private residence  # Steps to Enter: 0  Wheelchair Ramp: Yes  One/Two Story Residence: One story  Living Alone: No  Support Systems: Child(dong), Other Family Member(s)  Tub or Shower Type: Tub/Shower combination    EXAMINATION/PRESENTATION/DECISION MAKING:   Critical Behavior:  Neurologic State: Alert  Orientation Level: Oriented X4  Cognition: Follows commands     Hearing: Auditory  Auditory Impairment: None  Skin:  Intact (surgical site not observed)  Edema: none  Range Of Motion:  AROM: Within functional limits           PROM: Within functional limits           Strength:    Strength: Within functional limits                    Tone & Sensation:   Tone: Normal              Sensation: Intact               Coordination:  Coordination: Within functional limits     Functional Mobility:  Bed Mobility:  Rolling: Independent  Supine to Sit: Independent  Sit to Supine: Independent  Scooting: Independent  Transfers:  Sit to Stand: Supervision  Stand to Sit: Supervision        Balance:   Sitting: Intact  Standing: Intact; Without support  Ambulation/Gait Training:  Distance (ft): 100 Feet (ft)     Ambulation - Level of Assistance: Stand-by assistance     Gait Description (WDL): Exceptions to WDL  Gait Abnormalities: Other (hip abducted gait quality, increased lateral sway w/o LOB)        Base of Support: Widened     Speed/Marichuy: Pace decreased (<100 feet/min)         Functional Measure:  Tinetti test:    Sitting Balance: 1  Arises: 1  Attempts to Rise: 2  Immediate Standing Balance: 2  Standing Balance: 1  Nudged: 2  Eyes Closed: 1  Turn 360 Degrees - Continuous/Discontinuous: 0  Turn 360 Degrees - Steady/Unsteady: 1  Sitting Down: 1  Balance Score: 12 Balance total score  Indication of Gait: 1  R Step Length/Height: 1  L Step Length/Height: 1  R Foot Clearance: 1  L Foot Clearance: 1  Step Symmetry: 1  Step Continuity: 1  Path: 1  Trunk: 2  Walking Time: 0  Gait Score: 10 Gait total score  Total Score: 22/28 Overall total score         Tinetti Tool Score Risk of Falls  <19 = High Fall Risk  19-24 = Moderate Fall Risk  25-28 = Low Fall Risk  Kirstin ME. Performance-Oriented Assessment of Mobility Problems in Elderly Patients. [facility]; [id number]. (Scoring Description: PT Bulletin Feb. 10, 1993)    Older adults: Mitchell Castellanos et al, 2009; n = 1000 [facility] elderly evaluated with ABC, DANDY, ADL, and IADL)  · Mean DANDY score for males aged 69-68 years = 26.21(3.40)  · Mean DANDY score for females age 69-68 years = 25.16(4.30)  · Mean DANDY score for males over 80 years = 23.29(6.02)  · Mean DANDY score for females over 80 years = 17.20(8.32)          Physical Therapy Evaluation Charge Determination   History Examination Presentation Decision-Making   MEDIUM  Complexity : 1-2 comorbidities / personal factors will impact the outcome/ POC  LOW Complexity : 1-2 Standardized tests and measures addressing body structure, function, activity limitation and / or participation in recreation  LOW Complexity : Stable, uncomplicated  Other outcome measures Tinetti  LOW       Based on the above components, the patient evaluation is determined to be of the following complexity level: LOW     Pain Rating:  No c/o pain    Activity Tolerance:   Good      After treatment patient left in no apparent distress:   Supine in bed, Call bell within reach and Caregiver / family present    COMMUNICATION/EDUCATION:   The patients plan of care was discussed with: Registered nurse. Fall prevention education was provided and the patient/caregiver indicated understanding.     Thank you for this referral.  Claudetta Cannon, PT, DPT   Time Calculation: 15 mins

## 2021-10-14 LAB
ANION GAP SERPL CALC-SCNC: 3 MMOL/L (ref 5–15)
BASOPHILS # BLD: 0.1 K/UL (ref 0–0.1)
BASOPHILS NFR BLD: 0 % (ref 0–1)
BUN SERPL-MCNC: 22 MG/DL (ref 6–20)
BUN/CREAT SERPL: 11 (ref 12–20)
CALCIUM SERPL-MCNC: 8.8 MG/DL (ref 8.5–10.1)
CHLORIDE SERPL-SCNC: 111 MMOL/L (ref 97–108)
CO2 SERPL-SCNC: 25 MMOL/L (ref 21–32)
CREAT SERPL-MCNC: 1.92 MG/DL (ref 0.7–1.3)
DIFFERENTIAL METHOD BLD: ABNORMAL
EOSINOPHIL # BLD: 0.1 K/UL (ref 0–0.4)
EOSINOPHIL NFR BLD: 1 % (ref 0–7)
ERYTHROCYTE [DISTWIDTH] IN BLOOD BY AUTOMATED COUNT: 13.3 % (ref 11.5–14.5)
GLUCOSE SERPL-MCNC: 112 MG/DL (ref 65–100)
HCT VFR BLD AUTO: 30.3 % (ref 36.6–50.3)
HGB BLD-MCNC: 10 G/DL (ref 12.1–17)
IMM GRANULOCYTES # BLD AUTO: 0.1 K/UL (ref 0–0.04)
IMM GRANULOCYTES NFR BLD AUTO: 0 % (ref 0–0.5)
INR PPP: 1.2 (ref 0.9–1.1)
LYMPHOCYTES # BLD: 2.3 K/UL (ref 0.8–3.5)
LYMPHOCYTES NFR BLD: 17 % (ref 12–49)
MCH RBC QN AUTO: 27.2 PG (ref 26–34)
MCHC RBC AUTO-ENTMCNC: 33 G/DL (ref 30–36.5)
MCV RBC AUTO: 82.3 FL (ref 80–99)
MONOCYTES # BLD: 1.7 K/UL (ref 0–1)
MONOCYTES NFR BLD: 12 % (ref 5–13)
NEUTS SEG # BLD: 9.5 K/UL (ref 1.8–8)
NEUTS SEG NFR BLD: 70 % (ref 32–75)
NRBC # BLD: 0 K/UL (ref 0–0.01)
NRBC BLD-RTO: 0 PER 100 WBC
PLATELET # BLD AUTO: 180 K/UL (ref 150–400)
PMV BLD AUTO: 10.4 FL (ref 8.9–12.9)
POTASSIUM SERPL-SCNC: 4.4 MMOL/L (ref 3.5–5.1)
PROTHROMBIN TIME: 12.4 SEC (ref 9–11.1)
RBC # BLD AUTO: 3.68 M/UL (ref 4.1–5.7)
SODIUM SERPL-SCNC: 139 MMOL/L (ref 136–145)
WBC # BLD AUTO: 13.8 K/UL (ref 4.1–11.1)

## 2021-10-14 PROCEDURE — 85025 COMPLETE CBC W/AUTO DIFF WBC: CPT

## 2021-10-14 PROCEDURE — 85610 PROTHROMBIN TIME: CPT

## 2021-10-14 PROCEDURE — 74011250636 HC RX REV CODE- 250/636: Performed by: NURSE PRACTITIONER

## 2021-10-14 PROCEDURE — 74011250636 HC RX REV CODE- 250/636: Performed by: SURGERY

## 2021-10-14 PROCEDURE — 2709999900 HC NON-CHARGEABLE SUPPLY

## 2021-10-14 PROCEDURE — 65270000029 HC RM PRIVATE

## 2021-10-14 PROCEDURE — P9047 ALBUMIN (HUMAN), 25%, 50ML: HCPCS | Performed by: SURGERY

## 2021-10-14 PROCEDURE — 36415 COLL VENOUS BLD VENIPUNCTURE: CPT

## 2021-10-14 PROCEDURE — 86901 BLOOD TYPING SEROLOGIC RH(D): CPT

## 2021-10-14 PROCEDURE — 74011250637 HC RX REV CODE- 250/637: Performed by: SURGERY

## 2021-10-14 PROCEDURE — 86923 COMPATIBILITY TEST ELECTRIC: CPT

## 2021-10-14 PROCEDURE — 80048 BASIC METABOLIC PNL TOTAL CA: CPT

## 2021-10-14 PROCEDURE — P9045 ALBUMIN (HUMAN), 5%, 250 ML: HCPCS | Performed by: NURSE PRACTITIONER

## 2021-10-14 RX ORDER — ALBUMIN HUMAN 50 G/1000ML
SOLUTION INTRAVENOUS
Status: COMPLETED | OUTPATIENT
Start: 2021-10-14 | End: 2021-10-14

## 2021-10-14 RX ORDER — ALBUMIN HUMAN 250 G/1000ML
25 SOLUTION INTRAVENOUS ONCE
Status: COMPLETED | OUTPATIENT
Start: 2021-10-14 | End: 2021-10-14

## 2021-10-14 RX ADMIN — ALBUMIN (HUMAN) 25 G: 2.5 SOLUTION INTRAVENOUS at 04:51

## 2021-10-14 RX ADMIN — Medication 20 ML: at 06:00

## 2021-10-14 RX ADMIN — ALBUMIN (HUMAN) 25 G: 2.5 SOLUTION INTRAVENOUS at 03:50

## 2021-10-14 RX ADMIN — POTASSIUM CHLORIDE, DEXTROSE MONOHYDRATE AND SODIUM CHLORIDE 50 ML/HR: 150; 5; 450 INJECTION, SOLUTION INTRAVENOUS at 22:21

## 2021-10-14 RX ADMIN — ACETAMINOPHEN 650 MG: 325 TABLET ORAL at 20:03

## 2021-10-14 RX ADMIN — SODIUM CHLORIDE, POTASSIUM CHLORIDE, SODIUM LACTATE AND CALCIUM CHLORIDE 250 ML: 600; 310; 30; 20 INJECTION, SOLUTION INTRAVENOUS at 04:17

## 2021-10-14 RX ADMIN — SODIUM CHLORIDE 500 ML: 900 INJECTION, SOLUTION INTRAVENOUS at 22:30

## 2021-10-14 RX ADMIN — SODIUM CHLORIDE, POTASSIUM CHLORIDE, SODIUM LACTATE AND CALCIUM CHLORIDE 250 ML: 600; 310; 30; 20 INJECTION, SOLUTION INTRAVENOUS at 03:15

## 2021-10-14 RX ADMIN — Medication 10 ML: at 14:43

## 2021-10-14 RX ADMIN — SODIUM CHLORIDE, POTASSIUM CHLORIDE, SODIUM LACTATE AND CALCIUM CHLORIDE 250 ML: 600; 310; 30; 20 INJECTION, SOLUTION INTRAVENOUS at 03:41

## 2021-10-14 RX ADMIN — POTASSIUM CHLORIDE, DEXTROSE MONOHYDRATE AND SODIUM CHLORIDE 125 ML/HR: 150; 5; 450 INJECTION, SOLUTION INTRAVENOUS at 04:17

## 2021-10-14 RX ADMIN — ALBUMIN (HUMAN) 25 G: 0.25 INJECTION, SOLUTION INTRAVENOUS at 03:57

## 2021-10-14 NOTE — PROGRESS NOTES
Rapid Response Team Note    Called by RN at 3:03 AM to see patient for hypotension, bleeding from surgical site stat. Upon entering the room the patient was asymptomatic. Events as described by RN caring for patient noted. Visit Vitals  BP (!) 87/55   Pulse 93   Temp 98.7 °F (37.1 °C)   Resp 15   Ht 5' 7\" (1.702 m)   Wt 77.1 kg (170 lb)   SpO2 100%   BMI 26.63 kg/m²       Gen: awake, alert in NAD  HEENT: wnl  Chest: symmetrical  Abd: midline incision with staples with active slow bleed  Neuro: AAO  Ext: BOSE    Pertinent Recent Labs and Xrays:  Recent Labs     10/13/21  0248 10/12/21  1008   WBC 15.9* 10.3   HGB 11.7* 12.7   HCT 35.1* 38.6    217     Recent Labs     10/13/21  0248 10/12/21  1008    141   K 4.1 4.5   * 110*   CO2 26 26   BUN 25* 24*   CREA 1.80* 1.77*   * 110*   CA 8.5 9.3     No results for input(s): INR, INREXT in the last 72 hours. No results for input(s): PH, PCO2, PO2 in the last 72 hours. No results for input(s): PHI, PO2I, PCO2I in the last 72 hours. No results for input(s): CPK, CKNDX, TROIQ in the last 72 hours.     No lab exists for component: CPKMB  No results found for: GLUCPOC      A/P: Hypotension  Surgical site bleeding  -  ml IV bolus  - 25% Albumin 25 g IV x 1  - call surgical team for further orders    Rg Clark NP  Critical care time unrelated to prior notes: 30 minutes

## 2021-10-14 NOTE — PROGRESS NOTES
RAPID RESPONSE TEAM     0302: Unable to go to respond to rapid response given subsequent RRT. CCU RN and Nursing Supervisor notified and responding.     Larisa Escobar RN  RRT  Ext. [phone]

## 2021-10-14 NOTE — PROGRESS NOTES
End of Shift Note    Bedside shift change report given to [facility], [address] (oncoming nurse) by Regine Colon RN (offgoing nurse). Report included the following information SBAR, Kardex, OR Summary, Procedure Summary, Intake/Output and MAR    Shift worked:  7a-7p     Shift summary and any significant changes:     Pt received all care well. No complaints of pain. Walked the miller with PT. Dressing drained quite a bit so dressing was changed, NP Alberto Henning aware. Incision still approximated. Dressing CDI. Concerns for physician to address:  none     Zone phone for oncoming shift:   [phone]       Activity:  Activity Level: Up with Assistance  Number times ambulated in hallways past shift: 1  Number of times OOB to chair past shift: 2    Cardiac:   Cardiac Monitoring: No      Cardiac Rhythm: Sinus Rhythm    Access:   Current line(s): PIV     Genitourinary:   Urinary status: voiding    Respiratory:   O2 Device: None (Room air)  Chronic home O2 use?: NO  Incentive spirometer at bedside: YES     GI:  Last Bowel Movement Date: 10/11/21  Current diet:  ADULT ORAL NUTRITION SUPPLEMENT Dinner; Clear Liquid  ADULT DIET Full Liquid  Passing flatus: YES  Tolerating current diet: YES       Pain Management:   Patient states pain is manageable on current regimen: YES    Skin:  Demian Score: 20  Interventions: increase time out of bed, PT/OT consult and limit briefs    Patient Safety:  Fall Score:  Total Score: 2  Interventions: gripper socks, pt to call before getting OOB and stay with me (per policy)  High Fall Risk: Yes    Length of Stay:  Expected LOS: - - -  Actual LOS: [address], RN

## 2021-10-14 NOTE — PROGRESS NOTES
Travis 94 applied to midline incision due to significant drainage. Blood has saturated the bulky dressing, pt gown, all blankets and has pooled under the patient on L and R abd. Per Day shift RN this happened x1 earlier and NP aware. Pt cleaned up and BP taken: see flowsheets. Pt is asymptomatic of blood loss at this time. 0010 Dressing to midline once again saturated. Reinforced and pressure tape applied. PT BP has decreased, but pt is still A+Ox4 and has no complaints. Perfect serve sent and page to [address] [place] [address],[facility] [phone] call regarding bleeding. 0150 Abdominal dressing saturated again. BP holding at 97/61. Pt otherwise asymptomatic. Reinaleonard Sutherland re-paged. Attempted to reach via cell phone by answering service with no success. Re-dressed abd with gauze, abd pads, medipore and pressure tape. [facility] paged; pt blood pressure 82/53. Ambulated to restroom. Orthostatics measured. Dressing reinforced again. 0300 Rapid response called. CCU RN, Nursing supervisor and myself at bedside.  Repaged Tennille Packer w/ MEWS Score (last day)     Date/Time MEWS Score Pulse Resp Temp BP Level of Consciousness SpO2    10/14/21 0349    97      (!) 92/52    100 %    10/14/21 0340    94      95/61    100 %    10/14/21 0330    (!) 102      (!) 88/57    100 %    10/14/21 0320    100      (!) 91/58    99 %    10/14/21 0313    93  15    (!) 87/55    100 %    10/14/21 0300    (!) 103  15    91/65    92 %    10/14/21 0257  3  (!) 103  14  98.7 °F (37.1 °C)  (!) 79/57  Alert (0)  98 %    10/14/21 0253          (!) 81/52  Alert (0)      10/14/21 0247    [place]    (!) 69/51  Alert (0)      10/14/21 0244    [place]    (!) 97/59  Alert (0)      10/14/21 0242          (!) 81/61  Alert (0)      10/14/21 0238    Shu Jones  (!) 95/57  Alert (0)      10/14/21 0234  Ezequiel Art  (!) 82/53  Alert (0)      10/14/21 0155    93      97/61        10/14/21 0138          (!) 107/59        10/14/21 0017  2 88  16  98.8 °F (37.1 °C)  (!) 96/59  Alert (0)  98 %    10/13/21 2225          (!) 105/57        10/13/21 2036  1  90  16  99.1 °F (37.3 °C)  (!) 102/54  Alert (0)  98 %    10/13/21 1528  2  66  18  100.2 °F (37.9 °C)  100/64  Alert (0)  100 %    10/13/21 1203  1  74  18  98.5 °F (36.9 °C)  116/70  Alert (0)  98 %    10/13/21 0739  1  77  18  99.1 °F (37.3 °C)  115/71  Alert (0)  97 %    10/13/21 0355  1  70  18  98 °F (36.7 °C)  130/60  Alert (0)  96 %    10/13/21 0000  1  62  18  99.1 °F (37.3 °C)  (!) 123/55  Alert (0)  97 %              0320 New dressing applied again, new IV started, Labs drawn, abd binder placed on patient, spoke with maribeth as Bridget Flynn not available. Later spoke with Bridget Flynn. 36 Spoke with Bridget Flynn via telephone. Has no further orders. 1518 [facility] at bedside to assess patient/incicison site. Some extra sutures placed and new dressing applied.        Aron Schlatter, RN

## 2021-10-14 NOTE — PROGRESS NOTES
0300: RRT called overhead    0305: RN arrived to bedside-- primary RN and nursing supervisor at bedside. On arrival, Pt is A/O, ansering all questions appropriately w/ no complaints of pain. BP 91/65  SpO2 92% on RA. Bedside report received -- Pt s/p laparotomy w/ lysis of adhesions d/t SBO on 10/12. Per RN, pt has ssaturated numerous abdominal dressings since start of [facility], RN has paged Dr. Romeo Gardiner multiple times but has been unsuccessful in reaching   Moderate to large amount of bloody draininge present on newly applied midline abdominal dressing. 0310Sylvester Awe, NP at bedside, updated on patient status -- verbal orders received for 250 mL LR bolus once. Orders placed. 0315: STAT lab orders placed for CBC, PT/INR, BMP -- New PIV established      0320: Dr. Cortes Re returned paged, updated on pt status. Verbal orders received for 25g Albumin once and type and screen    0340: Abdominal binder applied for additional support. Dr. Romeo Gardiner updated by primary RN -- Pt remains A? Ox4 w/ minimal complaints of pain BP 95/61 HR 94 SpO2% 100. DAISY Mcguire, RN RRT at bedside, RRT care assumed by DAISY Corbett

## 2021-10-14 NOTE — PROGRESS NOTES
RAPID RESPONSE TEAM    0340: Arrived to bedside s/p rapid response for hypotension and bleeding from abdominal incision. CCU charge RN, nursing supervisor, and primary RN at bedside. Patient receiving 250cc LR bolus. BP 92/52. . Patient asymptomatic. Second 250cc LR bolus ordered per RRT protocol to make 500cc. 25g 25% IV albumin also ordered by Dr. Chris Ford with general surgery. Placed on cardiac monitor. Hgb 10.3 & 30.3.     0431: Dr Bernard Galo at bedside to assess incision site. Continuous bright red oozing and dressing saturated. Sutures placed by MD and incision re dressed. 0455: /60. HR 80s-90s SR. Restarted continuous IVF. Patient staying on unit at this time Please call with any further questions or concerns.     Yris Vizcarra RN   RRT   Ext [phone]

## 2021-10-14 NOTE — PROGRESS NOTES
SURGERY PROGRESS NOTE      Admit Date: 10/12/2021    POD 2 Days Post-Op    Procedure: Procedure(s):  LAPAROTOMY,  LYSIS OF ADHESIONS      Subjective:     Patient denies abdominal pain and nausea. Has had bleeding from incision requiring 4 dressing changes in the last 8 hours . Objective:     Visit Vitals  /60   Pulse (!) 103   Temp 99.1 °F (37.3 °C)   Resp 15   Ht 5' 7\" (1.702 m)   Wt 77.1 kg (170 lb)   SpO2 100%   BMI 26.63 kg/m²        Temp (24hrs), Av.1 °F (37.3 °C), Min:98.5 °F (36.9 °C), Max:100.2 °F (37.9 °C)      No intake/output data recorded. 10/12 0701 - 10/13 1900  In: 7920 [P.O.:625; I.V.:1050]  Out: 1950 [Urine:1950]    Physical Exam:    General:  alert, cooperative, no distress, appears stated age   Abdomen: soft, bowel sounds active, non-tender   Incision:   oozing between staples at 3 locations. Area prepped with betadine. 1% lidocaine injected. 3 figure 8 sutures of 2-0 silk used to stop bleeding. Dry dressing applined           Lab Results   Component Value Date/Time    WBC 13.8 (H) 10/14/2021 03:19 AM    HGB 10.0 (L) 10/14/2021 03:19 AM    HCT 30.3 (L) 10/14/2021 03:19 AM    PLATELET 340  03:19 AM    MCV 82.3 10/14/2021 03:19 AM     Lab Results   Component Value Date/Time    GFR est non-AA 34 (L) 10/14/2021 03:19 AM    GFR est AA 41 (L) 10/14/2021 03:19 AM    Creatinine 1.92 (H) 10/14/2021 03:19 AM    BUN 22 (H) 10/14/2021 03:19 AM    Sodium 139 10/14/2021 03:19 AM    Potassium 4.4 10/14/2021 03:19 AM    Chloride 111 (H) 10/14/2021 03:19 AM    CO2 25 10/14/2021 03:19 AM       Assessment/plan: Active Problems:    SBO (small bowel obstruction) ([address] (10/12/2021)      Mesenteric ischemia ([facility] (10/12/2021)    recovering form CHITO as expected except from skin bleeding from the wound. Will stop lovenox I think risk of SMV thrombosis is low compared to risk of on going bleeding. Will observe today. If no further bleeding can D/C home later today.

## 2021-10-14 NOTE — PROGRESS NOTES
End of Shift Note    Bedside shift change report given to Maralee Litten, RN (oncoming nurse) by Erick Lux RN (offgoing nurse). Report included the following information SBAR, Kardex, Intake/Output, MAR and Recent Results    Shift worked:  [id number]     Shift summary and any significant changes:     Pt bleeding from midline incision and hypotensive this shift- see progress notes regarding status of patient. Stable at this time with no complaints. Dressings changed and current one is CDI with abdominal binder present. Pt tolerated all boluses and given meds. Concerns for physician to address:  Abd bleeding and hypotension.      Zone phone for oncoming shift:          \    Erick Lux RN

## 2021-10-15 VITALS
WEIGHT: 170 LBS | OXYGEN SATURATION: 100 % | BODY MASS INDEX: 26.68 KG/M2 | SYSTOLIC BLOOD PRESSURE: 134 MMHG | HEIGHT: 67 IN | RESPIRATION RATE: 19 BRPM | HEART RATE: 83 BPM | DIASTOLIC BLOOD PRESSURE: 75 MMHG | TEMPERATURE: 99.1 F

## 2021-10-15 LAB
ANION GAP SERPL CALC-SCNC: 1 MMOL/L (ref 5–15)
BUN SERPL-MCNC: 26 MG/DL (ref 6–20)
BUN/CREAT SERPL: 14 (ref 12–20)
CALCIUM SERPL-MCNC: 8.3 MG/DL (ref 8.5–10.1)
CHLORIDE SERPL-SCNC: 113 MMOL/L (ref 97–108)
CO2 SERPL-SCNC: 26 MMOL/L (ref 21–32)
CREAT SERPL-MCNC: 1.91 MG/DL (ref 0.7–1.3)
ERYTHROCYTE [DISTWIDTH] IN BLOOD BY AUTOMATED COUNT: 13.2 % (ref 11.5–14.5)
GLUCOSE SERPL-MCNC: 129 MG/DL (ref 65–100)
HCT VFR BLD AUTO: 21.1 % (ref 36.6–50.3)
HGB BLD-MCNC: 7.2 G/DL (ref 12.1–17)
HISTORY CHECKED?,CKHIST: NORMAL
MCH RBC QN AUTO: 27.9 PG (ref 26–34)
MCHC RBC AUTO-ENTMCNC: 34.1 G/DL (ref 30–36.5)
MCV RBC AUTO: 81.8 FL (ref 80–99)
NRBC # BLD: 0 K/UL (ref 0–0.01)
NRBC BLD-RTO: 0 PER 100 WBC
PLATELET # BLD AUTO: 134 K/UL (ref 150–400)
PMV BLD AUTO: 10.1 FL (ref 8.9–12.9)
POTASSIUM SERPL-SCNC: 4.6 MMOL/L (ref 3.5–5.1)
RBC # BLD AUTO: 2.58 M/UL (ref 4.1–5.7)
SODIUM SERPL-SCNC: 140 MMOL/L (ref 136–145)
WBC # BLD AUTO: 11.1 K/UL (ref 4.1–11.1)

## 2021-10-15 PROCEDURE — P9016 RBC LEUKOCYTES REDUCED: HCPCS

## 2021-10-15 PROCEDURE — 85027 COMPLETE CBC AUTOMATED: CPT

## 2021-10-15 PROCEDURE — 80048 BASIC METABOLIC PNL TOTAL CA: CPT

## 2021-10-15 PROCEDURE — 36415 COLL VENOUS BLD VENIPUNCTURE: CPT

## 2021-10-15 PROCEDURE — 36430 TRANSFUSION BLD/BLD COMPNT: CPT

## 2021-10-15 RX ORDER — SODIUM CHLORIDE 9 MG/ML
250 INJECTION, SOLUTION INTRAVENOUS AS NEEDED
Status: DISCONTINUED | OUTPATIENT
Start: 2021-10-15 | End: 2021-10-15 | Stop reason: HOSPADM

## 2021-10-15 RX ADMIN — Medication 5 ML: at 06:00

## 2021-10-15 NOTE — PROGRESS NOTES
RAPID RESPONSE TEAM- Follow Up     Received call from primary RN Jona Quigley, patient's BP 84/46 (59). Repeat 90/52 (65). Patient asymptomatic. Abdominal dressing intact with no further bleeding noted. Abdomen rounded, but soft. HR SR 80s-90s on tele monitor. Had temp 100.6 @ 2022 with HR 100s; resolved after po tylenol. Primary RN paged Dr. Barb Sutton with general surgery. 500cc NS bolus; no lab orders given. CBC and BMP am labs already ordered; recommend drawing closer to 0000. Will continue to monitor. Please call with any further questions or concerns. Sherryle Brakeman RN  Ext. [phone]    ADDENDUM:    0115: Notified by primary RN that Hgb dropped fro 10.0 to 7.2 in less than 12 hrs. Discussed paging Dr. Barb Sutton to notify and getting blood transfusion order.

## 2021-10-15 NOTE — PROGRESS NOTES
Problem: Falls - Risk of  Goal: *Absence of Falls  Description: Document Prabhu Mess Fall Risk and appropriate interventions in the flowsheet.   Outcome: Resolved/Met     Problem: Patient Education: Go to Patient Education Activity  Goal: Patient/Family Education  Outcome: Resolved/Met

## 2021-10-15 NOTE — PROGRESS NOTES
Bedside shift change report given to Elizabeth Almaraz RN (oncoming nurse) by [facility] CHIDI, RN (offgoing nurse). Report included the following information SBAR, Kardex, Intake/Output, MAR and Recent Results. See previous notes regarding hypotension and hgb. Pt stable at this time. Expected to go to OR this AM. Has no c/o and dressing is CDI with no abnormality. Ambulates with stand by assist due to hypotension and mildly unsteady gait.             Мария Londono RN

## 2021-10-15 NOTE — PROGRESS NOTES
DISCHARGE NOTE FROM [facility] NURSE    Patient determined to be stable for discharge by attending provider. I have reviewed the discharge instructions and follow-up appointments with the patient. They verbalized understanding and all questions were answered to their satisfaction. No complaints or further questions were expressed. No new medications sent to pharmacy. Appropriate educational materials and medication side effect teaching were provided. PIV and cardiac monitoring were removed prior to discharge. Patient did not discharge with any line, orourke, or drain. All personal items collected during admission were returned to the patient prior to discharge. Post-op patient: Yes- Patient given post-op discharge kit and instructed on use. Pt waiting for ride for discharge.       Eloise Ratliff RN

## 2021-10-15 NOTE — PROGRESS NOTES
SURGERY PROGRESS NOTE      Admit Date: 10/12/2021    POD 3 Days Post-Op    Procedure: Procedure(s):  LAPAROTOMY,  LYSIS OF ADHESIONS      Subjective:     Patient complains of being lightheaded on standing. Objective:     Visit Vitals  BP (!) 110/54   Pulse 68   Temp 98.9 °F (37.2 °C)   Resp 16   Ht 5' 7\" (1.702 m)   Wt 77.1 kg (170 lb)   SpO2 100%   BMI 26.63 kg/m²        Temp (24hrs), Av.4 °F (37.4 °C), Min:98.1 °F (36.7 °C), Max:100.6 °F (38.1 °C)      No intake/output data recorded. 10/13 1901 - 10/15 0700  In: 1450.8 [P.O.:440; I.V.:1010.8]  Out: -     Physical Exam:    General:  alert, cooperative, no distress, appears stated age   Abdomen: soft, bowel sounds active, non-tender   Incision:   healing well, no drainage, no erythema, no hernia, no seroma, no swelling, no dehiscence, incision well approximated           Lab Results   Component Value Date/Time    WBC 11.1 10/15/2021 12:47 AM    HGB 7.2 (L) 10/15/2021 12:47 AM    HCT 21.1 (L) 10/15/2021 12:47 AM    PLATELET 842 (L)  12:47 AM    MCV 81.8 10/15/2021 12:47 AM     Lab Results   Component Value Date/Time    GFR est non-AA 34 (L) 10/15/2021 12:47 AM    GFR est AA 41 (L) 10/15/2021 12:47 AM    Creatinine 1.91 (H) 10/15/2021 12:47 AM    BUN 26 (H) 10/15/2021 12:47 AM    Sodium 140 10/15/2021 12:47 AM    Potassium 4.6 10/15/2021 12:47 AM    Chloride 113 (H) 10/15/2021 12:47 AM    CO2 26 10/15/2021 12:47 AM       Assessment:     Active Problems:    SBO (small bowel obstruction) ([address] (10/12/2021)      Mesenteric ischemia ([address] (10/12/2021)    acute blood loss anemia secondary to bleeding from skin edges with anticoagulation. No signs continued bleeding. Since symptomatic anemia with transfuse one unit.      [facility]:     [facility]  I have discussed with the patient the rationale for blood component transfusion; its benefits in treating or preventing fatigue, organ damage, or death; and its risk which includes mild transfusion reactions, rare risk of blood borne infection, or more serious but rare reactions. I have discussed the alternatives to transfusion, including the risk and consequences of not receiving transfusion. The patient had an opportunity to ask questions and had agreed to proceed with transfusion of blood components.     2.  Can go home after transfusion if feels well

## 2021-10-15 NOTE — PROGRESS NOTES
No further concerns indicated at this time. AVS updated. Pt is ready for discharge from a Care Management standpoint. RN informed. When granddaughter arrives to transport pt home, pt will need to ready and brought to her vehicle at front of hospital. Pt's granddaughter will call x [phone] per request of RN when she arrives. Transition of Care Plan:    RUR: 15% low risk   Disposition: Home w/ family  Follow up appointments: PCP (left VM with nurse notifying that pt needs f/u appt), Surgeon - Dr. Juice Mc 10/28, details on AVS  DME needed: None   Transportation at Discharge: Carolyne Pearl at 4:30 PM  Dhaval Clear or means to access home: Pt will have access       IM Medicare Letter: Medicare pt has received, reviewed, and signed 2nd IM letter informing them of their right to appeal the discharge. Signed copy has been placed on pt bedside chart. Is patient a BCPI-A Bundle:        No   If yes, was Bundle Letter given?:     Caregiver Contact: Pt's granddaughterLincoln) [phone]  Discharge Caregiver contacted prior to discharge? Will contact granddaughter - contacted 11:48 AM 10/15    11:53 AM  CM called pt's granddaughter back to notify of regular diet. CM notified bedside RN of request for pt to be brought to front of hospital at d/c, RN requested for pt's granddaughter to call  when at hospital and pt will be brought down. No further questions or concerns at this time. CM notified pt that he had talked with granddaughter and she would be the one providing his transportation home. 11:48 AM  CM called pt's granddaughter, Nilda Bonds, to discuss d/c plans. Anticipated d/c time of approx 4:30 PM. Pt's granddaughter will be transporting pt home, will need pt ready and brought to her vehicle at entrance of hospital. Pt's granddaughter had questions about pt's diet, will send message to NP to discuss and call pt's granddaughter back with answer.  CM reviewed appts scheduled on AVS.    CM acknowledges discharge order. CM met with pt to discuss. Pt's discharge plan remains to return home with son and brother in law. Pt identifies good family support at home. CM has attempted to schedule PCP f/u, left VM with RN requesting return call and notifying of scheduled d/c today. CM scheduled f/u with Surgeon for recommended f/u time frame of 2 weeks, details on AVS. CM to call pt's granddaughter and review above information and see who will be transporting pt home.     Brittanie Xie 633, [address]

## 2021-10-15 NOTE — DISCHARGE INSTRUCTIONS
Open Abd Surgery: What to Expect at [address] [place] [address] Drive are likely to have pain that comes and goes for the next few days after bowel surgery. You may have bowel cramps, and your cut (incision) may hurt. You may also feel like you have the flu. You may have a low fever and feel tired and nauseated. This is common. You should feel better after a week and will probably be back to normal in 2 to 3 weeks. This care sheet gives you a general idea about how long it will take for you to recover. But each person recovers at a different pace. Follow the steps below to get better as quickly as possible. How can you care for yourself at home? Activity  · Rest when you feel tired. Getting enough sleep will help you recover. · Try to walk each day. Start by walking a little more than you did the day before. Bit by bit, increase the amount you walk. Walking boosts blood flow and helps prevent pneumonia and constipation. · Avoid strenuous activities, such as biking, jogging, weight lifting, or aerobic exercise, until your doctor says it is okay. · Ask your doctor when you can drive again. · You will probably need to take 3 to 4 weeks off from work. It depends on the type of work you do and how you feel. You may need to take off 4 to 6 weeks if you lift heavy objects in your job. · You may shower 24 to 48 hours after surgery, if your doctor says it is okay. Pat the cut (incision) dry. Do not take a bath for the first 2 weeks, or until your doctor tells you it is okay. · Ask your doctor when it is okay for you to have sex. Diet  · You may not have much appetite after the surgery. But try to eat a healthy diet. Your doctor will tell you about any foods you should not eat. · Eat a low-fiber diet for several weeks after surgery. Eat many small meals throughout the day. Add high-fiber foods a little at a time. · Eat yogurt. It puts good bacteria into your colon and helps prevent diarrhea.   · Try to avoid nuts, seeds, and corn for a while. They may be hard to digest.  · You may need to take vitamins that contain sodium and potassium. Ask your doctor. · Drink plenty of fluids to avoid becoming dehydrated. Medicines  · Your doctor will tell you if and when you can restart your medicines. He or she will also give you instructions about taking any new medicines. · If you take blood thinners, such as warfarin (Coumadin), clopidogrel (Plavix), or aspirin, be sure to talk to your doctor. He or she will tell you if and when to start taking those medicines again. Make sure that you understand exactly what your doctor wants you to do. · Take pain medicines exactly as directed. ¨ If the doctor gave you a prescription medicine for pain, take it as prescribed. ¨ If you are not taking a prescription pain medicine, ask your doctor if you can take an over-the-counter medicine. ¨ Do not take two or more pain medicines at the same time unless the doctor told you to. Many pain medicines have acetaminophen, which is Tylenol. Too much acetaminophen (Tylenol) can be harmful. · If you think your pain medicine is making you sick to your stomach:  ¨ Take your medicine after meals (unless your doctor tells you not to). ¨ Ask your doctor for a different pain medicine. · If your doctor prescribed antibiotics, take them as directed. Do not stop taking them just because you feel better. You need to take the full course of antibiotics. · You may need to take some medicines in a different form. You will be told whether to crush pills or take a liquid form of the medicine. · If your doctor gives you a stool softener, take it as directed. Incision care  · If you have strips of tape on the incision, leave the tape on for a week or until it falls off. · Wash the area daily with warm, soapy water, and pat it dry. Follow-up care is a key part of your treatment and safety.  Be sure to make and go to all appointments, and call your doctor if you are having problems. It's also a good idea to know your test results and keep a list of the medicines you take. When should you call for help? Call 911 anytime you think you may need emergency care. For example, call if:  · You passed out (lost consciousness). · You have sudden chest pain and shortness of breath, or you cough up blood. · You have severe pain in your belly. Call your doctor now or seek immediate medical care if:  · You are sick to your stomach and cannot drink fluids or keep them down. · You have signs of a blood clot, such as:  ¨ Pain in your calf, back of the knee, thigh, or groin. ¨ Redness and swelling in your leg or groin. · You have a lot of diarrhea that smells very bad. · You have trouble passing urine or stool, especially if you have mild pain or swelling in your lower belly. · You have signs of infection, such as:  ¨ Increased pain, swelling, warmth, or redness. ¨ Red streaks leading from the incision. ¨ Pus draining from the incision. ¨ A fever. · You have pain that does not get better after you take pain medicine. · You have loose stitches, or your incision comes open. · You are bleeding or have new drainage from the incision. Watch closely for any changes in your health, and be sure to contact your doctor if:  · You do not have a bowel movement after taking a laxative. · You do not get better as expected.

## 2021-10-15 NOTE — DISCHARGE SUMMARY
Post- Surgical Discharge Summary    Patient ID:  Reina Cooper  [id number]  male  [de-identified] y.o.  1940    Admit date: 10/12/2021    Discharge date: 10/15/2021    Admitting Physician: Layne Grove MD     Consulting Physician(s):   Treatment Team: Attending Provider: Paula Pan MD; Consulting Provider: Paula Pan MD; Utilization Review: Mar Halsted, RN; Care Manager: Viviana Chowdary    Date of Surgery:   10/12/2021     Preoperative Diagnosis:  ischemic bowel    Postoperative Diagnosis:   ADHESIONS, SBO    Procedure(s):  LAPAROTOMY,  LYSIS OF ADHESIONS     Anesthesia Type:   General     Surgeon: Paula Pan MD                            HPI:  Pt is a [de-identified] y.o. male who has a history of ischemic bowel who presents at this time for a laparotomy and lysis of adhesions. Problem List:   Problem List as of 10/15/2021 Never Reviewed        Codes Class Noted - Resolved    SBO (small bowel obstruction) ([facility] [address] ICD-10-CM: K65.967  ICD-9-CM: 560.9  10/12/2021 - Present        Mesenteric ischemia ([facility] [address] ICD-10-CM: K55.9  ICD-9-CM: 557.9  10/12/2021 - Present               Hospital Course: The patient underwent surgery. Intra-operative complications: None; patient tolerated the procedure well. Was taken to the PACU in stable condition and then transferred to the surgical floor. Perioperative Antibiotics: Cefazolin    Postoperative Pain Management:  Multimodal, opioid sparing pain control methods were used. Postoperative transfusions:  Number of units banked PRBCs =  one    Post Op complications: Incisional bleeding likely from therapeutic anticoagulation. Controlled with suture placement at sites of bleeding. Incisions - clean, dry and intact. No significant erythema or swelling. Wound(s) appear to be healing without any evidence of infection. Patient mobilized with nursing and was found to be safe and steady with ambulation.      Discharged to: Home    Condition on Discharge: Stable Discharge instructions:    - Take pain medications as prescribed  - Diet Regular  - Discharge activity:    - Activity as tolerated    - Ambulate several times a day   - No heavy lifting for 4 weeks   - Do not drive for two weeks or while on opioid pain medications  - Wound Care: Keep wound(s) clean and dry. See discharge instruction sheet. Allergies:  No Known Allergies           -DISCHARGE MEDICATION LIST     Current Discharge Medication List      CONTINUE these medications which have NOT CHANGED    Details   aspirin 81 mg chewable tablet Take 81 mg by mouth daily. lisinopril-hydroCHLOROthiazide (PRINZIDE, ZESTORETIC) 20-25 mg per tablet Take 1 Tablet by mouth daily. atorvastatin (LIPITOR) 40 mg tablet Take 40 mg by mouth daily. cholecalciferol (VITAMIN D3) (1000 Units /25 mcg) tablet Take 1,000 Units by mouth daily. per medical continuation form      -Follow up in office in 2 weeks      Signed:  Sheela Reno.  Malik Basurto  MSN, APRN, FNP-C, [facility]  Surgical Nurse Practitioner    10/15/2021  10:37 AM

## 2021-10-15 NOTE — PROGRESS NOTES
End of Shift Note    Bedside shift change report given to [address] [place] [address]  (oncoming nurse) by Karen River (offgoing nurse). Report included the following information SBAR, Kardex, Procedure Summary, Intake/Output, MAR and Recent Results    Shift worked:  7a-7p     Shift summary and any significant changes:     No complaints of pain. Pt tolerating diet with fair appetite. Pt up to chair for all meals. Ambulating to the bathroom with assistance. Vitals stable. No drainage from abdominal wound noted. Concerns for physician to address:       Zone phone for oncoming shift:   [phone]       Activity:  Activity Level: Up with Assistance  Number times ambulated in hallways past shift: 0  Number of times OOB to chair past shift: 3    Cardiac:   Cardiac Monitoring: Yes      Cardiac Rhythm: Sinus Rhythm    Access:   Current line(s): PIV     Genitourinary:   Urinary status: voiding    Respiratory:   O2 Device: None (Room air)  Chronic home O2 use?: NO  Incentive spirometer at bedside: YES  Actual Volume (ml): 1200 ml  GI:  Last Bowel Movement Date: 10/11/21  Current diet:  ADULT ORAL NUTRITION SUPPLEMENT Dinner; Clear Liquid  ADULT DIET Regular  Passing flatus: YES  Tolerating current diet: YES       Pain Management:   Patient states pain is manageable on current regimen: YES    Skin:  Demian Score: 20  Interventions: increase time out of bed    Patient Safety:  Fall Score:  Total Score: 2  Interventions: gripper socks and pt to call before getting OOB, Bed alarm on   High Fall Risk: Yes    Length of Stay:  Expected LOS: - - -  Actual LOS: [address]

## 2021-10-15 NOTE — PROGRESS NOTES
2000 Tech made this writer aware of pt temp of 100.6. Gave PRN tylenol per MAR. I also noted pt to be mildly hypotensive with MAP at 69. Pt asymptomatic of hypotension. Abd dressing with no drainage and belly soft. 2045 Pt temp rechecked post tylenol; 100.5. Ice packs placed to groins and underarms. 2215 Pt vitals rechecked; now afrebile- ice packs removed but pt more hypotensive. Mildly tachycardic. Called to update marli Ramirez regarding status of patient for opinion. 2230 Page to [facility] regarding hypotension and temp. Verbal orders for 500CC NS bolus given. Inquired about lab workup; per [facility] no need. 2240 NS bolus started and pt resting comfortably at this time. Pt running sinus rhythm/sinus tach on tele. 0045 BP recheck post bolus; 91/49. AM labs sent; currently pending. 0115 call received from lab regarding hgb drop; now 7.2. Discussed with rapid RN. Page to [facility] sent      3924 BP rechecked; 106/56.     0445 BP recheccked 98/55. Spoke to [facility], MD regarding drop in hgb and consistent hypotension. Per Sobeida Rued no new orders at this time stating \"I'll take care of it when I get there\". Pt resting in bed with bed alarm activated. 0630 CHG bath and linen change completed in preparation of procedure at ~0845.     Renuka Mcdonald RN

## 2021-10-16 LAB
ABO + RH BLD: NORMAL
BLD PROD TYP BPU: NORMAL
BLOOD GROUP ANTIBODIES SERPL: NORMAL
BPU ID: NORMAL
CROSSMATCH RESULT,%XM: NORMAL
SPECIMEN EXP DATE BLD: NORMAL
STATUS OF UNIT,%ST: NORMAL
UNIT DIVISION, %UDIV: 0

## 2021-10-16 NOTE — PROGRESS NOTES
Physician Progress Note      PATIENT:               Gretel Davis  CSN #:                  [id number]  :                       1940  ADMIT DATE:       10/12/2021 10:35 AM  DISCH DATE:        10/15/2021 4:43 PM  RESPONDING  PROVIDER #:        Guy Pedraza NP          QUERY TEXT:    Patient admitted with Mesenteric ischemia , noted to have creat 1.77--1.91 (37). If possible, please document in progress notes and discharge summary if you are evaluating and/or treating any of the following: The medical record reflects the following:  Risk Factors: ischemia, s/p LAPAROTOMY,  LYSIS OF ADHESIONS  Clinical Indicators: crea 1.77 (37)  Treatment: ivfs, labs  Thanks,  Randall Allen RN/CDI   Options provided:  -- pavel on ckd  -- ckd (stage)  -- crea 1.77 (37) not clinically significant  -- Other - I will add my own diagnosis  -- Disagree - Not applicable / Not valid  -- Disagree - Clinically unable to determine / Unknown  -- Refer to Clinical Documentation Reviewer    PROVIDER RESPONSE TEXT:    Provider is clinically unable to determine a response to this query.     Query created by: Sameer Neves on 10/15/2021 10:51 AM      Electronically signed by:  Guy Pedraza NP 10/16/2021 12:36 PM

## 2021-10-17 DIAGNOSIS — G89.18 PAIN AT SURGICAL SITE: Primary | ICD-10-CM

## 2021-10-17 RX ORDER — HYDROCODONE BITARTRATE AND ACETAMINOPHEN 5; 325 MG/1; MG/1
1 TABLET ORAL
Qty: 20 TABLET | Refills: 0 | Status: SHIPPED | OUTPATIENT
Start: 2021-10-17 | End: 2021-10-22

## 2021-10-18 VITALS
TEMPERATURE: 98.2 F | WEIGHT: 171.3 LBS | RESPIRATION RATE: 16 BRPM | SYSTOLIC BLOOD PRESSURE: 135 MMHG | HEIGHT: 67 IN | HEART RATE: 90 BPM | DIASTOLIC BLOOD PRESSURE: 63 MMHG | BODY MASS INDEX: 26.89 KG/M2 | OXYGEN SATURATION: 100 %

## 2021-10-18 PROCEDURE — 99282 EMERGENCY DEPT VISIT SF MDM: CPT

## 2021-10-19 ENCOUNTER — HOSPITAL ENCOUNTER (EMERGENCY)
Age: 81
Discharge: HOME | End: 2021-10-19
Attending: EMERGENCY MEDICINE
Payer: MEDICARE

## 2021-10-19 DIAGNOSIS — T81.9XXA ABNORMAL SURGICAL WOUND, INITIAL ENCOUNTER: Primary | ICD-10-CM

## 2021-10-19 NOTE — ED NOTES
Pt presents to the ED for a wound check. Pt states he noticed his bandage was bleeding last night when he went to the bathroom. Pt denies pain. Pt states bandage hasn't been changed since Friday. Pt had hernia surgery. Bottom part of bandage is soaked in blood.

## 2021-10-19 NOTE — ED PROVIDER NOTES
EMERGENCY DEPARTMENT HISTORY AND PHYSICAL EXAM      Date: 10/19/2021  Patient Name: Terra Romano    History of Presenting Illness     Chief Complaint   Patient presents with    Wound Check     Pt ambulatory to triage with c/o bleeding to dressing to umbilical area, had hernia surgery on 10/12, states dressing has not been changed since surgery    Other       History Provided By: Patient    HPI: Terra Romano, [de-identified] y.o. 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 Patient has had the same surgical dressing on since his surgery and is not sure when he supposed to change his bandage. He has an appointment on October 28. He was discharged from the hospital 3 days ago. States that he was sleeping tonight and got up to use the restroom and noticed that the lower half of his surgical dressing was covered in blood. His abdominal pain has been fairly well controlled and he has not had any increase in pain since the bleeding episode. The bleeding does not seem to be continuing, although the lower half of his dressing seem to be soaked when he got up. PCP: Jordan Brown, Not On File, MD    No current facility-administered medications on file prior to encounter. Current Outpatient Medications on File Prior to Encounter   Medication Sig Dispense Refill    HYDROcodone-acetaminophen (Norco) 5-325 mg per tablet Take 1 Tablet by mouth every four (4) hours as needed for Pain for up to 5 days. Max Daily Amount: 6 Tablets. 20 Tablet 0    aspirin 81 mg chewable tablet Take 81 mg by mouth daily.  lisinopril-hydroCHLOROthiazide (PRINZIDE, ZESTORETIC) 20-25 mg per tablet Take 1 Tablet by mouth daily.  atorvastatin (LIPITOR) 40 mg tablet Take 40 mg by mouth daily.       cholecalciferol (VITAMIN D3) (1000 Units /25 mcg) tablet Take 1,000 Units by mouth daily. Past History     Past Medical History:  Past Medical History:   Diagnosis Date    Arthritis     Cancer ([address]     PROSTATE    Hypertension     Other ill-defined conditions(799.89)     elevated cholesterol       Past Surgical History:  Past Surgical History:   Procedure Laterality Date    COLONOSCOPY,DIAGNOSTIC  2012         HX ORTHOPAEDIC  2000    L SHOULDER-UNKNOWN PROCEDURE    HX PROSTATECTOMY      AR PROSTATE BIOPSY, NEEDLE, SATURATION SAMPLING         Family History:  Family History   Problem Relation Age of Onset    Heart Disease Mother     Heart Disease Father     Heart Disease Brother     Heart Disease Other        Social History:  Social History     Tobacco Use    Smoking status: Former Smoker     Packs/day: 0.01     Years: 1.00     Pack years: 0.01     Quit date: 9/10/1980     Years since quittin.1    Smokeless tobacco: Never Used   Substance Use Topics    Alcohol use: Yes     Comment: OCCASIONAL    Drug use: No       Allergies:  No Known Allergies      Review of Systems   Review of Systems   Constitutional: Negative for chills and fever. HENT: Negative for congestion, ear pain, rhinorrhea and sore throat. Eyes: Negative for visual disturbance. Respiratory: Negative for cough, chest tightness, shortness of breath and wheezing. Cardiovascular: Negative for chest pain and palpitations. Gastrointestinal: Negative for abdominal pain, diarrhea, nausea and vomiting. Genitourinary: Negative for dysuria and flank pain. Musculoskeletal: Negative for back pain, myalgias and neck pain. Skin: Positive for wound. Neurological: Negative for dizziness, syncope, light-headedness and headaches. Psychiatric/Behavioral: Negative for confusion. The patient is not nervous/anxious. All other systems reviewed and are negative.         Physical Exam   General appearance - well nourished, well appearing, and in no distress  Eyes - pupils equal and reactive, extraocular eye movements intact  ENT - mucous membranes moist, pharynx normal without lesions  Neck - supple, no significant adenopathy; non-tender to palpation  Chest - clear to auscultation, no wheezes, rales or rhonchi; non-tender to palpation  Heart - normal rate and regular rhythm, S1 and S2 normal, no murmurs noted  Abdomen - soft, abdomen is nondistended and nontender, there is a stapled laparotomy incision in place, lower half of bandage is bloodsoaked. Bandage was removed and staples are intact without any drainage or continued bleeding, there is no surrounding erythema or warmth to suggest infection. No masses or organomegaly  Musculoskeletal - no joint tenderness, deformity or swelling; normal ROM  Extremities - peripheral pulses normal, no pedal edema  Skin - normal coloration and turgor, no rashes  Neurological - alert, oriented x3, normal speech, no focal findings or movement disorder noted    Diagnostic Study Results     Labs -   No results found for this or any previous visit (from the past 12 hour(s)). Radiologic Studies -   No orders to display     CT Results  (Last 48 hours)    None        CXR Results  (Last 48 hours)    None            Medical Decision Making   I am the first provider for this patient. I reviewed the vital signs, available nursing notes, past medical history, past surgical history, family history and social history. Vital Signs-Reviewed the patient's vital signs. Patient Vitals for the past 12 hrs:   Temp Pulse Resp BP SpO2   10/18/21 2351 98.2 °F (36.8 °C) 90 16 135/63 100 %           Records Reviewed: Nursing Notes and Old Medical Records    Provider Notes (Medical Decision Making):   Differential diagnosis: Wound dehiscence, seroma, hematoma, wound infection      ED Course:   Initial assessment performed. The patients presenting problems have been discussed, and they are in agreement with the care plan formulated and outlined with them. I have encouraged them to ask questions as they arise throughout their visit. Progress Notes:  ED Course as of Oct 19 0619   Tue Oct 19, 2021   0619 Surgical dressing was removed. Stapled wound appears intact. There is a small area about one third of the way up the surgical wound that appears to have had some bleeding with dried blood surrounding, but there is no active bleeding now. There is no separation of the wound edges. There is no purulence and no surrounding erythema. Area is nontender. Suggests that there may have been a subcutaneous hematoma that spontaneously drained, there is no current bleeding. Will redress wound and encourage patient to follow-up with his surgeon.   [AO]      ED Course User Index  [AO] Danay Caceres MD       Disposition:  Discharge home    PLAN:  1. Current Discharge Medication List        2. Follow-up Information     Follow up With Specialties Details Why Contact Info    \[facility] [place] [facility]\"" EMERGENCY DEPT Emergency Medicine  If symptoms worsen [address] Angela MD General Surgery, Breast Surgery, Surgery, Oncology, BONITA HAYDE [facility] Call today  [address]  [address] [address] [phone]          Return to ED if worse     Diagnosis     Clinical Impression:   1.  Abnormal surgical wound, initial encounter

## 2021-10-20 ENCOUNTER — TELEPHONE (OUTPATIENT)
Dept: SURGERY | Age: 81
End: 2021-10-20

## 2021-10-21 NOTE — TELEPHONE ENCOUNTER
Informed patient family to change dressing every day, no cream or ointment, wash hands before and after each dressing change. Keep f/u appointment  10/28/21. If any sign of infection such as purulent drainage, discoloration, warmth to touch, foul odor & fever   call. Express understanding.

## 2021-10-25 NOTE — PROGRESS NOTES
Postop Progress Note    Subjective    Anna Garcia presents to the office for postop follow up. Patient is an 30-year-old male, status post October 12, 2021, laparotomy and reduction of small bowel volvulus around a omental adhesion converting congested ischemic bowel into viable pink bowel with no resection necessary. Objective    There were no vitals taken for this visit. General: alert, cooperative and no distress  Incision: healing well    Assessment  Doing well postoperatively. Eating well, good function, good GI function as well    Plan  1. Continue any current medications  2. Wound care discussed  3. Pt is to increase activities as tolerated  4. Usual diet  5. Follow up: as needed  Six-midline incision clean, I removed the midline skin staples there are some residual absorbable Vicryl skin sutures which I will leave in place to dissolve secondarily, no signs of complications, dressings reapplied and reviewed with patient's niece who is present about keeping the incision covered where his pants are rubbing.  Operative findings reviewed with patient and family    Electronically signed by Casandra Shook MD on 10/28/2021 at 1:02 PM

## 2021-10-28 ENCOUNTER — OFFICE VISIT (OUTPATIENT)
Dept: SURGERY | Age: 81
End: 2021-10-28
Payer: MEDICARE

## 2021-10-28 VITALS
BODY MASS INDEX: 25.9 KG/M2 | HEIGHT: 67 IN | SYSTOLIC BLOOD PRESSURE: 121 MMHG | DIASTOLIC BLOOD PRESSURE: 63 MMHG | WEIGHT: 165 LBS | TEMPERATURE: 97.5 F | RESPIRATION RATE: 18 BRPM | OXYGEN SATURATION: 98 % | HEART RATE: 62 BPM

## 2021-10-28 DIAGNOSIS — Z09 POSTOPERATIVE EXAMINATION: Primary | ICD-10-CM

## 2021-10-28 DIAGNOSIS — K56.609 SBO (SMALL BOWEL OBSTRUCTION) (HCC): ICD-10-CM

## 2021-10-28 PROCEDURE — 99024 POSTOP FOLLOW-UP VISIT: CPT | Performed by: SURGERY

## 2021-10-28 NOTE — PROGRESS NOTES
Identified pt with two pt identifiers(name and ). Reviewed record in preparation for visit and have obtained necessary documentation. All patient medications has been reviewed. Chief Complaint   Patient presents with    Follow-up       Health Maintenance Due   Topic    COVID-19 Vaccine (1)    DTaP/Tdap/Td series (1 - Tdap)    Shingrix Vaccine Age 49> (1 of 2)    Pneumococcal 65+ years (1 of 1 - PPSV23)    Medicare Yearly Exam     Flu Vaccine (1)       Vitals:    10/28/21 1312   BP: 121/63   Pulse: 62   Resp: 18   Temp: 97.5 °F (36.4 °C)   TempSrc: Temporal   SpO2: 98%   Weight: 74.8 kg (165 lb)   Height: 5' 7\" (1.702 m)   PainSc:   0 - No pain       4. Have you been to the ER, urgent care clinic since your last visit? Hospitalized since your last visit? No    5. Have you seen or consulted any other health care providers outside of the [address] [place] [address] since your last visit? Include any pap smears or colon screening. No      Patient is accompanied by daughter I have received verbal consent from Lauren Mancilla to discuss any/all medical information while they are present in the room.

## 2021-12-23 ENCOUNTER — HOSPITAL ENCOUNTER (EMERGENCY)
Age: 81
Discharge: HOME | End: 2021-12-23
Attending: EMERGENCY MEDICINE
Payer: MEDICARE

## 2021-12-23 VITALS
SYSTOLIC BLOOD PRESSURE: 158 MMHG | HEART RATE: 83 BPM | TEMPERATURE: 98.5 F | HEIGHT: 67 IN | DIASTOLIC BLOOD PRESSURE: 76 MMHG | RESPIRATION RATE: 18 BRPM | BODY MASS INDEX: 27.3 KG/M2 | WEIGHT: 173.94 LBS | OXYGEN SATURATION: 100 %

## 2021-12-23 DIAGNOSIS — I10 ACCELERATED HYPERTENSION: ICD-10-CM

## 2021-12-23 DIAGNOSIS — M25.512 ACUTE PAIN OF LEFT SHOULDER: Primary | ICD-10-CM

## 2021-12-23 DIAGNOSIS — S46.912A REPETITIVE STRAIN INJURY OF LEFT SHOULDER, INITIAL ENCOUNTER: ICD-10-CM
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
## 2021-12-23 PROCEDURE — 74011000250 HC RX REV CODE- 250: Performed by: EMERGENCY MEDICINE

## 2021-12-23 PROCEDURE — 96372 THER/PROPH/DIAG INJ SC/IM: CPT

## 2021-12-23 PROCEDURE — 74011250636 HC RX REV CODE- 250/636: Performed by: EMERGENCY MEDICINE

## 2021-12-23 PROCEDURE — 99282 EMERGENCY DEPT VISIT SF MDM: CPT

## 2021-12-23 RX ORDER — KETOROLAC TROMETHAMINE 30 MG/ML
30 INJECTION, SOLUTION INTRAMUSCULAR; INTRAVENOUS
Status: DISCONTINUED | OUTPATIENT
Start: 2021-12-23 | End: 2021-12-23

## 2021-12-23 RX ORDER — KETOROLAC TROMETHAMINE 30 MG/ML
30 INJECTION, SOLUTION INTRAMUSCULAR; INTRAVENOUS
Status: COMPLETED | OUTPATIENT
Start: 2021-12-23 | End: 2021-12-23

## 2021-12-23 RX ORDER — METHOCARBAMOL 750 MG/1
750 TABLET, FILM COATED ORAL 3 TIMES DAILY
Qty: 15 TABLET | Refills: 0 | Status: SHIPPED | OUTPATIENT
Start: 2021-12-23

## 2021-12-23 RX ORDER — DICLOFENAC SODIUM 50 MG/1
50 TABLET, DELAYED RELEASE ORAL 3 TIMES DAILY
Qty: 15 TABLET | Refills: 0 | Status: SHIPPED | OUTPATIENT
Start: 2021-12-23

## 2021-12-23 RX ORDER — DICLOFENAC SODIUM 10 MG/G
GEL TOPICAL 4 TIMES DAILY
Qty: 2 EACH | Refills: 0 | Status: SHIPPED | OUTPATIENT
Start: 2021-12-23

## 2021-12-23 RX ORDER — LIDOCAINE 4 G/100G
1 PATCH TOPICAL ONCE
Status: DISCONTINUED | OUTPATIENT
Start: 2021-12-23 | End: 2021-12-24 | Stop reason: HOSPADM

## 2021-12-23 RX ORDER — LIDOCAINE 4 G/100G
PATCH TOPICAL
Qty: 5 PATCH | Refills: 0 | Status: SHIPPED | OUTPATIENT
Start: 2021-12-23

## 2021-12-23 RX ADMIN — KETOROLAC TROMETHAMINE 30 MG: 30 INJECTION, SOLUTION INTRAMUSCULAR; INTRAVENOUS at 21:18

## 2021-12-24 NOTE — ED PROVIDER NOTES
EMERGENCY DEPARTMENT HISTORY AND PHYSICAL EXAM      Please note that this dictation was completed with the assistance of \"Dragon\", the computer voice recognition software. Quite often unanticipated grammatical, syntax, homophones, and other interpretive errors are inadvertently transcribed by the computer software. Please disregard these errors and any errors that have escaped final proofreading. Thank you. Patient: Tito Lowery  DOS: 22  : 1940  MRN: [id number]  History of Presenting Illness     Chief Complaint   Patient presents with    Shoulder Pain     left shoulder pain started last night; PT states its chronic; denies SOB, chest pain     History Provided By: Patient/family/EMS (if applicable)    HPI: Tito Lowery, [de-identified] y.o. male with past medical history as documented below presents to the ED with c/o of acute on chronic left shoulder pain. Pt states he's had prior left shoulder surgery in  and has delt with pain since. He states last night he lifted something causing the pain worsen. He's able to range his shoulder. No numbness or weakness. He is right hand dominant. Denies radiation of pain to chest or back. He states pain worsens with movement and palpation. Pt denies any other exacerbating or ameliorating factors. Additionally, pt specifically denies any recent fever, chills, headache, nausea, vomiting, abdominal pain, CP, SOB, lightheadedness, dizziness, numbness, weakness, lower extremity swelling, heart palpitations, urinary sxs, diarrhea, constipation, melena, hematochezia, cough, or congestion. There are no other complaints, changes or physical findings pertinent to the HPI at this time.     PCP: Unknown, Provider, MD  Past History   Past Medical History:  Past Medical History:   Diagnosis Date    Arthritis     Cancer ([facility] [address]     PROSTATE    Hypertension     Other ill-defined conditions(799.89)     elevated cholesterol       Past Surgical History:  Past Surgical History: Procedure Laterality Date    COLONOSCOPY,DIAGNOSTIC  2012         HX ORTHOPAEDIC      L SHOULDER-UNKNOWN PROCEDURE    HX PROSTATECTOMY      GA PROSTATE BIOPSY, NEEDLE, SATURATION SAMPLING         Family History:   Family history reviewed and was non-contributory, unless specified below:  Family History   Problem Relation Age of Onset    Heart Disease Mother     Heart Disease Father     Heart Disease Brother     Heart Disease Other        Social History:  Social History     Tobacco Use    Smoking status: Former Smoker     Packs/day: 0.01     Years: 1.00     Pack years: 0.01     Quit date: 9/10/1980     Years since quittin.3    Smokeless tobacco: Never Used   Substance Use Topics    Alcohol use: Yes     Comment: OCCASIONAL    Drug use: No       Allergies:  No Known Allergies    Current Medications:  No current facility-administered medications on file prior to encounter. Current Outpatient Medications on File Prior to Encounter   Medication Sig Dispense Refill    aspirin 81 mg chewable tablet Take 81 mg by mouth daily.  lisinopril-hydroCHLOROthiazide (PRINZIDE, ZESTORETIC) 20-25 mg per tablet Take 1 Tablet by mouth daily.  atorvastatin (LIPITOR) 40 mg tablet Take 40 mg by mouth daily.  cholecalciferol (VITAMIN D3) (1000 Units /25 mcg) tablet Take 1,000 Units by mouth daily. Review of Systems   A complete ROS was reviewed by me today and all other systems negative, unless otherwise specified below:  Review of Systems   Constitutional: Negative. Negative for chills and fever. HENT: Negative. Negative for congestion and sore throat. Eyes: Negative. Respiratory: Negative. Negative for cough, chest tightness, shortness of breath and wheezing. Cardiovascular: Negative. Negative for chest pain, palpitations and leg swelling. Gastrointestinal: Negative.   Negative for abdominal distention, abdominal pain, blood in stool, constipation, diarrhea, nausea and vomiting. Endocrine: Negative. Genitourinary: Negative. Negative for dysuria, flank pain, frequency, hematuria and urgency. Musculoskeletal: Positive for arthralgias. Negative for back pain and myalgias. Skin: Negative. Negative for color change and rash. Neurological: Negative. Negative for dizziness, syncope, speech difficulty, weakness, light-headedness, numbness and headaches. Hematological: Negative. Psychiatric/Behavioral: Negative. Negative for confusion and self-injury. The patient is not nervous/anxious. All other systems reviewed and are negative. Physical Exam   Physical Exam  Vitals and nursing note reviewed. Constitutional:       Appearance: He is well-developed. He is not toxic-appearing. HENT:      Head: Normocephalic and atraumatic. Mouth/Throat:      Pharynx: No posterior oropharyngeal erythema. Eyes:      Conjunctiva/sclera: Conjunctivae normal.   Cardiovascular:      Rate and Rhythm: Normal rate and regular rhythm. Heart sounds: Normal heart sounds. No murmur heard. No friction rub. No gallop. Pulmonary:      Effort: Pulmonary effort is normal. No respiratory distress. Breath sounds: Normal breath sounds. No wheezing or rales. Chest:      Chest wall: No tenderness. Abdominal:      General: Bowel sounds are normal. There is no distension. Palpations: Abdomen is soft. There is no mass. Tenderness: There is no abdominal tenderness. There is no guarding or rebound. Musculoskeletal:         General: Tenderness (TTP left shoulder, no deformity, able to full range joint, no erythema, NVI ) present. Normal range of motion. Cervical back: Normal range of motion. Skin:     General: Skin is warm. Neurological:      General: No focal deficit present. Mental Status: He is alert and oriented to person, place, and time. Motor: No abnormal muscle tone. Psychiatric:         Behavior: Behavior is cooperative.        Diagnostic Study Results     Laboratory Data  I have personally reviewed and interpreted all available laboratory results. No results found for this or any previous visit (from the past 24 hour(s)). Radiologic Studies   I have personally reviewed and interpreted all available imaging studies and agree with radiology interpretation. No orders to display     CT Results  (Last 48 hours)    None        CXR Results  (Last 48 hours)    None        Medical Decision Making   I am the first and primary ED physician for this patient's ED visit today. I reviewed our electronic medical record system for any past medical records that may contribute to the patient's current condition, including their past medical history, surgical history, social and family history. This also includes their most recent ED visits, previous hospitalizations and prior diagnostic data. I have reviewed and summarized the most pertinent findings in my HPI and MDM. Vital Signs Reviewed:  Patient Vitals for the past 24 hrs:   Temp Pulse Resp BP SpO2   12/23/21 2016 98.5 °F (36.9 °C) 83 18 (!) 158/76 100 %     Pulse Oximetry Analysis: 100% on RA    Cardiac Monitor:   Rate: 83 bpm  The cardiac monitor revealed the following rhythm as interpreted by me: Normal Sinus Rhythm  Cardiac monitoring was ordered to monitor patient for signs of cardiac dysrhythmia, which they are at risk for based on their history and/or risk for cardiovascular disease and/or metabolic abnormalities. Records Reviewed: Nursing Notes, Old Medical Records, Previous electrocardiograms, Previous Radiology Studies and Previous Laboratory Studies, EMS reports    Provider Notes (Medical Decision Making):   Patient presents with shoulder pain. No trauma, acute on chronic in nature. No evidence of dislocation, fracture, AC separation, clavicle fracture or septic joint.  For the shoulder problem, he is discharged with a sling and asked to apply heat for 10-15 minutes four times a day followed by passive pendulum range of motion exercises to prevent frozen shoulder. He may use small weight in the hand if desired and tolerated. In the future, warm up and stretch the shoulder prior to exercising in a similar fashion. If any worsening symptoms, can follow up with Sports medicine. ED Course:   Initial assessment performed. I discussed presenting problems and concerns, and my formulated plan for today's visit with the patient and any available family members. I have encouraged them to ask questions as they arise throughout the visit. Social History     Tobacco Use    Smoking status: Former Smoker     Packs/day: 0.01     Years: 1.00     Pack years: 0.01     Quit date: 9/10/1980     Years since quittin.3    Smokeless tobacco: Never Used   Substance Use Topics    Alcohol use: Yes     Comment: OCCASIONAL    Drug use: No       ED Orders Placed:  Orders Placed This Encounter    DISCONTD: ketorolac (TORADOL) injection 30 mg    lidocaine 4 % patch 1 Patch    ketorolac (TORADOL) injection 30 mg    diclofenac EC (VOLTAREN) 50 mg EC tablet    methocarbamoL (Robaxin-750) 750 mg tablet    diclofenac (Voltaren) 1 % gel    lidocaine (Lidocare) 4 % patch       ED Medications Administered During ED Course:  Medications   lidocaine 4 % patch 1 Patch (1 Patch TransDERmal Apply Patch 21)   ketorolac (TORADOL) injection 30 mg (30 mg IntraMUSCular Given 21)        Progress Note:  I have just re-evaluated the patient. Patient reports improvement of sx's. I have reviewed His vital signs and determined there is currently no worsening in their condition or physical exam. Results have been reviewed with them and their questions have been answered. We will continue to review further results as they come available. Progress Note:  Pt reassessed and symptoms noted to have improved significantly after ED treatment. Pt is clinically stable for discharge.  David Anguiano's labs and imaging have been reviewed with him and available family. He verbally conveys understanding and agreement of the signs, symptoms, diagnosis, treatment and prognosis and additionally agrees to follow up as recommended with Dr. Winnie Olvera, Provider, MD and/or specialist as instructed. He agrees with the care plan we have created and conveys that all of his questions have been answered. Additionally, I have put together a packet of discharge instructions for him that include: 1) educational information regarding their diagnosis, 2) how to care for their diagnosis at home, as well a 3) list of reasons why they would want to return to the ED prior to their follow-up appointment should the patient's condition change or symptoms worsen. I have answered all questions to the patient's satisfaction. Strict return precautions given. He conveyed understanding and agreement with care plan. Vital signs stable for discharge. Disposition:  DISCHARGE  The pt is ready for discharge. The pt's signs, symptoms, diagnosis, and discharge instructions have been discussed and pt has conveyed their understanding. The pt is to follow up as recommended or return to ER should their symptoms worsen. Plan has been discussed and pt is in agreement. Plan:  1. Return precautions as discussed with patient and available family/caregiver. 2.   Discharge Medication List as of 12/23/2021  9:27 PM      START taking these medications    Details   diclofenac EC (VOLTAREN) 50 mg EC tablet Take 1 Tablet by mouth three (3) times daily. , Normal, Disp-15 Tablet, R-0      methocarbamoL (Robaxin-750) 750 mg tablet Take 1 Tablet by mouth three (3) times daily. , Normal, Disp-15 Tablet, R-0      diclofenac (Voltaren) 1 % gel Apply  to affected area four (4) times daily. , Normal, Disp-2 Each, R-0      lidocaine (Lidocare) 4 % patch Use as directed, Normal, Disp-5 Patch, R-0         CONTINUE these medications which have NOT CHANGED    Details   aspirin 81 mg chewable tablet Take 81 mg by mouth daily. , Historical Med      lisinopril-hydroCHLOROthiazide (PRINZIDE, ZESTORETIC) 20-25 mg per tablet Take 1 Tablet by mouth daily. , Historical Med      atorvastatin (LIPITOR) 40 mg tablet Take 40 mg by mouth daily. , Historical Med      cholecalciferol (VITAMIN D3) (1000 Units /25 mcg) tablet Take 1,000 Units by mouth daily. , Historical Med           3. Follow-up Information     Follow up With Specialties Details Why Contact Info    [facility] EMERGENCY DEPT Emergency Medicine  As needed, If symptoms worsen [address]  [address] [facility]  [phone]    Unknown, Provider, MD   As needed, If symptoms worsen Patient not available to ask      [facility]   As needed, If symptoms worsen [address]  [phone]        Instructed to return to ED if worse  Diagnosis   Clinical Impression:  1. Acute pain of left shoulder    2. Repetitive strain injury of left shoulder, initial encounter    3. Accelerated hypertension      Attestation:  Viral Whalen MD, am the attending of record for this patient. I personally performed the services described in this documentation on this date, 12/23/2021 for patient, Alejandra Alvarenga. I have reviewed the chart and verified that the record is accurate and complete.

## 2021-12-24 NOTE — DISCHARGE INSTRUCTIONS
Thank You! It was a pleasure taking care of you in our Emergency Department today. We know that when you come to [facility], you are entrusting us with your health, comfort, and safety. Our physicians and nurses honor that trust, and truly appreciate the opportunity to care for you and your loved ones. We also value your feedback. If you receive a survey about your Emergency Department experience today, please fill it out. We care about our patients' feedback, and we listen to what you have to say. Thank you. Dr. Macario Poe M.D.      ________________________________________________________________________  I have included a copy of your lab results and/or radiologic studies from today's visit so you can have them easily available at your follow-up visit. We hope you feel better and please do not hesitate to contact the ED if you have any questions at all! No results found for this or any previous visit (from the past 12 hour(s)). No orders to display     CT Results  (Last 48 hours)      None          The exam and treatment you received in the Emergency Department were for an urgent problem and are not intended as complete care. It is important that you follow up with a doctor, nurse practitioner, or physician assistant for ongoing care. If your symptoms become worse or you do not improve as expected and you are unable to reach your usual health care provider, you should return to the Emergency Department. We are available 24 hours a day. Please take your discharge instructions with you when you go to your follow-up appointment. If a prescription has been provided, please have it filled as soon as possible to prevent a delay in treatment. Read the entire medication instruction sheet provided to you by the pharmacy.  If you have any questions or reservations about taking the medication due to side effects or interactions with other medications, please call your primary care physician or contact the ER to speak with the charge nurse. Please make an appointment with your family doctor or the physician you were referred to for follow-up of this visit as instructed on your discharge paperwork. Return to the ER if you are unable to be seen or if you are unable to be seen in a timely manner. If you have any problem arranging the follow-up visit, contact the Emergency Department immediately.

## 2022-03-19 PROBLEM — K56.609 SBO (SMALL BOWEL OBSTRUCTION) (HCC): Status: ACTIVE | Noted: 2021-10-12

## 2022-03-20 PROBLEM — K55.9 MESENTERIC ISCHEMIA (HCC): Status: ACTIVE | Noted: 2021-10-12

## 2022-12-03 ENCOUNTER — HOSPITAL ENCOUNTER (EMERGENCY)
Age: 82
Discharge: HOME | End: 2022-12-03
Attending: EMERGENCY MEDICINE
Payer: MEDICARE

## 2022-12-03 ENCOUNTER — APPOINTMENT (OUTPATIENT)
Dept: GENERAL RADIOLOGY | Age: 82
End: 2022-12-03
Attending: EMERGENCY MEDICINE
Payer: MEDICARE

## 2022-12-03 VITALS
HEART RATE: 74 BPM | SYSTOLIC BLOOD PRESSURE: 122 MMHG | OXYGEN SATURATION: 100 % | WEIGHT: 161.82 LBS | DIASTOLIC BLOOD PRESSURE: 68 MMHG | BODY MASS INDEX: 25.4 KG/M2 | HEIGHT: 67 IN | RESPIRATION RATE: 16 BRPM | TEMPERATURE: 99 F

## 2022-12-03 DIAGNOSIS — R05.8 PRODUCTIVE COUGH: Primary | ICD-10-CM

## 2022-12-03 LAB
COVID-19 RAPID TEST, COVR: NOT DETECTED
FLUAV AG NPH QL IA: NEGATIVE
FLUBV AG NOSE QL IA: NEGATIVE
SOURCE, COVRS: NORMAL

## 2022-12-03 PROCEDURE — 99284 EMERGENCY DEPT VISIT MOD MDM: CPT

## 2022-12-03 PROCEDURE — 87804 INFLUENZA ASSAY W/OPTIC: CPT

## 2022-12-03 PROCEDURE — 87635 SARS-COV-2 COVID-19 AMP PRB: CPT

## 2022-12-03 PROCEDURE — 71046 X-RAY EXAM CHEST 2 VIEWS: CPT

## 2022-12-03 RX ORDER — DEXTROMETHORPHAN HYDROBROMIDE, GUAIFENESIN 20; 400 MG/20ML; MG/20ML
20 SOLUTION ORAL
Qty: 118 ML | Refills: 0 | Status: SHIPPED | OUTPATIENT
Start: 2022-12-03

## 2022-12-03 RX ORDER — AZITHROMYCIN 250 MG/1
TABLET, FILM COATED ORAL
Qty: 6 TABLET | Refills: 0 | Status: SHIPPED | OUTPATIENT
Start: 2022-12-03 | End: 2022-12-08

## 2022-12-03 RX ORDER — DEXAMETHASONE 6 MG/1
12 TABLET ORAL
Qty: 2 TABLET | Refills: 0 | Status: SHIPPED | OUTPATIENT
Start: 2022-12-03 | End: 2022-12-04

## 2022-12-03 NOTE — ED PROVIDER NOTES
EMERGENCY DEPARTMENT HISTORY AND PHYSICAL EXAM      Please note that this dictation was completed with [employer], the computer voice recognition software. Quite often unanticipated grammatical, syntax, homophones, and other interpretive errors are inadvertently transcribed by the computer software. Please disregard these errors. Please excuse any errors that have escaped final proofreading. Date: 12/3/2022  Patient Name: Richar Dillon    History of Presenting Illness     Chief Complaint   Patient presents with    Cough     Cough and cold symptoms for 4 or 5 days; denies fever . Denies pain. Pt is not visibly short of breath. History Provided By: Patient    HPI: Richar Dillon, 81 y.o. male with past medical history of arthritis, prostate cancer, hyperlipidemia, and hypertension presents ambulatory with a cc of  a productive cough for 5 days. Patient notes his cough is worse at night and he has no lasting relief with Mucinex. No history of asthma and does not actively smoke cigarettes. Son, who lives with patient, has similar symptoms. Denies recent travel. Denies fever, fatigue, congestion, ear pain, eye pain, sore throat, body aches, chest pain, or shortness of breath. There are no other complaints, changes, or physical findings at this time. PCP: Unknown, Provider, MD    Current Outpatient Medications   Medication Sig Dispense Refill    azithromycin (Zithromax Z-Jefry) 250 mg tablet Take 2 tablets (500mg) on day 1; then take 1 tablet (250 mg) on days 2, 3, 4 and 5 6 Tablet 0    dexAMETHasone (DECADRON) 6 mg tablet Take 2 Tablets by mouth Daily (before breakfast) for 1 day. 2 Tablet 0    dextromethorphan-guaiFENesin (Robitussin Cough-Chest Anthony DM) 5-100 mg/5 mL liqd Take 20 mL by mouth four (4) times daily as needed for Cough or Congestion. 118 mL 0    diclofenac EC (VOLTAREN) 50 mg EC tablet Take 1 Tablet by mouth three (3) times daily.  15 Tablet 0    methocarbamoL (Robaxin-750) 750 mg tablet Take 1 Tablet by mouth three (3) times daily. 15 Tablet 0    diclofenac (Voltaren) 1 % gel Apply  to affected area four (4) times daily. 2 Each 0    lidocaine (Lidocare) 4 % patch Use as directed 5 Patch 0    aspirin 81 mg chewable tablet Take 81 mg by mouth daily. lisinopril-hydroCHLOROthiazide (PRINZIDE, ZESTORETIC) 20-25 mg per tablet Take 1 Tablet by mouth daily. atorvastatin (LIPITOR) 40 mg tablet Take 40 mg by mouth daily. cholecalciferol (VITAMIN D3) (1000 Units /25 mcg) tablet Take 1,000 Units by mouth daily. Past History     Past Medical History:  Past Medical History:   Diagnosis Date    Arthritis     Cancer ([facility] [address]     PROSTATE    Hypertension     Other ill-defined conditions(799.89)     elevated cholesterol       Past Surgical History:  Past Surgical History:   Procedure Laterality Date    COLONOSCOPY,DIAGNOSTIC  2012         HX ORTHOPAEDIC  2000    L SHOULDER-UNKNOWN PROCEDURE    HX PROSTATECTOMY      NC PROSTATE BIOPSY, NEEDLE, SATURATION SAMPLING         Family History:  Family History   Problem Relation Age of Onset    Heart Disease Mother     Heart Disease Father     Heart Disease Brother     Heart Disease Other        Social History:  Social History     Tobacco Use    Smoking status: Former     Packs/day: 0.01     Years: 1.00     Pack years: 0.01     Types: Cigarettes     Quit date: 9/10/1980     Years since quittin.2    Smokeless tobacco: Never   Substance Use Topics    Alcohol use: Yes     Comment: OCCASIONAL    Drug use: No       Allergies:  No Known Allergies  Review of Systems   Review of Systems   Constitutional:  Negative for fatigue and fever. HENT:  Negative for congestion, ear pain, rhinorrhea and sore throat. Eyes:  Negative for pain and redness. Respiratory:  Positive for cough. Negative for shortness of breath. Cardiovascular:  Negative for chest pain. Gastrointestinal:  Negative for diarrhea, nausea and vomiting.    Genitourinary:  Negative for flank pain.   Musculoskeletal:  Negative for myalgias. Skin:  Negative for rash. Neurological:  Negative for weakness and headaches. Physical Exam   Physical Exam  Constitutional:       Appearance: Normal appearance. HENT:      Head: Normocephalic and atraumatic. Nose: Nose normal. No congestion or rhinorrhea. Mouth/Throat:      Mouth: Mucous membranes are moist.      Pharynx: No oropharyngeal exudate or posterior oropharyngeal erythema. Eyes:      Conjunctiva/sclera: Conjunctivae normal.      Pupils: Pupils are equal, round, and reactive to light. Cardiovascular:      Rate and Rhythm: Normal rate and regular rhythm. Pulmonary:      Effort: Pulmonary effort is normal.      Breath sounds: Normal breath sounds. Abdominal:      Palpations: Abdomen is soft. Tenderness: There is no abdominal tenderness. Musculoskeletal:         General: Normal range of motion. Cervical back: Normal range of motion and neck supple. Skin:     General: Skin is warm and dry. Capillary Refill: Capillary refill takes less than 2 seconds. Neurological:      Mental Status: He is alert and oriented to person, place, and time. Diagnostic Study Results     Labs -     Recent Results (from the past 12 hour(s))   INFLUENZA A+B VIRAL AGS    Collection Time: 12/03/22  9:53 AM   Result Value Ref Range    Influenza A Antigen Negative NEG      Influenza B Antigen Negative NEG     COVID-19 RAPID TEST    Collection Time: 12/03/22  9:53 AM   Result Value Ref Range    Specimen source Nasopharyngeal      COVID-19 rapid test Not detected NOTD         Radiologic Studies -   XR CHEST PA LAT   Final Result   No acute findings. CT Results  (Last 48 hours)      None          CXR Results  (Last 48 hours)                 12/03/22 0929  XR CHEST PA LAT Final result    Impression:  No acute findings.            Narrative:  EXAM: XR CHEST PA LAT       INDICATION: cough for 4 to 5 days; no pain        COMPARISON: Chest radiographs 10/3/2011       TECHNIQUE: PA and lateral chest views       FINDINGS:   Cardiac mediastinal silhouette is within normal limits. Aorta is tortuous. Lungs   and pleural spaces are grossly clear. Medical Decision Making   I am the first provider for this patient. I reviewed the vital signs, available nursing notes, past medical history, past surgical history, family history and social history. Vital Signs-Reviewed the patient's vital signs. Patient Vitals for the past 12 hrs:   Temp Pulse Resp BP SpO2   12/03/22 0910 99 °F (37.2 °C) 74 16 122/68 100 %       Pulse Oximetry Analysis - 100% on RA    Records Reviewed: Nursing Notes and Old Medical Records    Provider Notes (Medical Decision Making):   DDx: Influenza, COVID, pneumonia, respiratory infection     Patient sitting comfortable in exam room with complaint of productive cough for 5 days. Vital signs are within normal limits and lungs are clear to auscultation. Patient denies other symptoms and continuation of the physical exam is benign. Testing for influenza and COVID are negative. Chest radiograph reveals no acute cardiopulmonary findings. Will offer course of macrolide antibiotic given productive cough and concern for atypical organisms as well as symptomatic management including dexamethasone and Robitussin. Educate patient to return to ED if symptomatics worsen or continue. ED Course:   Initial assessment performed. The patients presenting problems have been discussed, and they are in agreement with the care plan formulated and outlined with them. I have encouraged them to ask questions as they arise throughout their visit. Disposition:  Discharge     PLAN:  1.    Discharge Medication List as of 12/3/2022 11:21 AM        START taking these medications    Details   azithromycin (Zithromax Z-Jefry) 250 mg tablet Take 2 tablets (500mg) on day 1; then take 1 tablet (250 mg) on days 2, 3, 4 and 5, Normal, Disp-6 Tablet, R-0      dexAMETHasone (DECADRON) 6 mg tablet Take 2 Tablets by mouth Daily (before breakfast) for 1 day., Normal, Disp-2 Tablet, R-0      dextromethorphan-guaiFENesin (Robitussin Cough-Chest Anthony DM) 5-100 mg/5 mL liqd Take 20 mL by mouth four (4) times daily as needed for Cough or Congestion. , Normal, Disp-118 mL, R-0           CONTINUE these medications which have NOT CHANGED    Details   diclofenac EC (VOLTAREN) 50 mg EC tablet Take 1 Tablet by mouth three (3) times daily. , Normal, Disp-15 Tablet, R-0      methocarbamoL (Robaxin-750) 750 mg tablet Take 1 Tablet by mouth three (3) times daily. , Normal, Disp-15 Tablet, R-0      diclofenac (Voltaren) 1 % gel Apply  to affected area four (4) times daily. , Normal, Disp-2 Each, R-0      lidocaine (Lidocare) 4 % patch Use as directed, Normal, Disp-5 Patch, R-0      aspirin 81 mg chewable tablet Take 81 mg by mouth daily. , Historical Med      lisinopril-hydroCHLOROthiazide (PRINZIDE, ZESTORETIC) 20-25 mg per tablet Take 1 Tablet by mouth daily. , Historical Med      atorvastatin (LIPITOR) 40 mg tablet Take 40 mg by mouth daily. , Historical Med      cholecalciferol (VITAMIN D3) (1000 Units /25 mcg) tablet Take 1,000 Units by mouth daily. , Historical Med           2. Follow-up Information       Follow up With Specialties Details Why Contact Info    [facility] EMERGENCY DEPT Emergency Medicine Go to  If symptoms worsen [address] [place] [address]  [phone]          Return to ED if worse     Diagnosis     Clinical Impression:   1. Productive cough              Attestations: This note is prepared in part by Linnie Epley, PA-S, during her clinical rotation. The Physician Assistant student's documentation has been prepared under my direction and personally reviewed by me in its entirety. I confirm that the note above accurately reflects all work, treatment, procedures, and medical decision making performed by me.     TOMAS Stafford

## 2023-08-06 ENCOUNTER — HOSPITAL ENCOUNTER (EMERGENCY)
Facility: HOSPITAL | Age: 83
Discharge: HOME | End: 2023-08-06
Attending: EMERGENCY MEDICINE
Payer: MEDICARE

## 2023-08-06 VITALS
WEIGHT: 169.31 LBS | TEMPERATURE: 98.6 F | BODY MASS INDEX: 25.66 KG/M2 | RESPIRATION RATE: 16 BRPM | HEIGHT: 68 IN | SYSTOLIC BLOOD PRESSURE: 149 MMHG | DIASTOLIC BLOOD PRESSURE: 65 MMHG | HEART RATE: 70 BPM | OXYGEN SATURATION: 100 %

## 2023-08-06 DIAGNOSIS — K42.9 UMBILICAL HERNIA WITHOUT OBSTRUCTION AND WITHOUT GANGRENE: Primary | ICD-10-CM

## 2023-08-06 PROCEDURE — 99283 EMERGENCY DEPT VISIT LOW MDM: CPT

## 2023-08-06 ASSESSMENT — PAIN SCALES - GENERAL: PAINLEVEL_OUTOF10: 0

## 2023-08-07 NOTE — ED NOTES
DC info reviewed with patient, all questions answered. Patient well-appearing at time of discharge and vital signs stable. Ambulatory out of ED at this time.        Christofer Torres RN  08/06/23 [id number]

## 2023-08-07 NOTE — ED PROVIDER NOTES
\[facility]\"" EMERGENCY DEPT  EMERGENCY DEPARTMENT ENCOUNTER       Pt Name: Elise Livingston  MRN: [id number]  [address] [facility] 1940  Date of evaluation: 8/6/2023  Provider: Mechelle Boles MD   PCP: None None  Note Started: 8:38 PM EDT 8/6/23     CHIEF COMPLAINT       Chief Complaint   Patient presents with    Abdominal Pain     Patient presents to triage ambulatory complaining of a knot in his stomach. Patient denies any nausea, vomiting, diarrhea, fever or chills at this time. HISTORY OF PRESENT ILLNESS: 1 or more elements      History From: Patient, History limited by: none     Elise Livingston is a 81 y.o. male patient with history of prostate cancer and hypertension, presents with \"knot in abdomen\". Patient noticed it today. Denies any pain, dysuria, change in bowel habits, fever, lightheadedness, dizziness, chest pain or shortness of breath. The knot is located near the umbilicus at the site of a prior surgical incision. Please See MDM for Additional Details of the HPI/PMH  Nursing Notes were all reviewed and agreed with or any disagreements were addressed in the HPI. REVIEW OF SYSTEMS        Positives and Pertinent negatives as per HPI.     PAST HISTORY     Past Medical History:  Past Medical History:   Diagnosis Date    Arthritis     Cancer ([address])     PROSTATE    Hypertension     Other ill-defined conditions(799.89)     elevated cholesterol       Past Surgical History:  Past Surgical History:   Procedure Laterality Date    COLONOSCOPY,DIAGNOSTIC  9/12/2012         ORTHOPEDIC SURGERY  2000    L SHOULDER-UNKNOWN PROCEDURE    PROSTATE BIOPSY, NEEDLE, SATURATION SAMPLING      PROSTATECTOMY         Family History:  Family History   Problem Relation Age of Onset    Heart Disease Mother     Heart Disease Father     Heart Disease Brother     Heart Disease Other        Social History:  Social History     Tobacco Use    Smoking status: Former     Packs/day: 0.01     Types: Cigarettes     Quit date: 9/10/1980

## 2024-01-18 ENCOUNTER — APPOINTMENT (OUTPATIENT)
Facility: HOSPITAL | Age: 84
End: 2024-01-18
Payer: MEDICARE

## 2024-01-18 ENCOUNTER — HOSPITAL ENCOUNTER (EMERGENCY)
Facility: HOSPITAL | Age: 84
Discharge: HOME | End: 2024-01-19
Attending: EMERGENCY MEDICINE
Payer: MEDICARE

## 2024-01-18 VITALS
DIASTOLIC BLOOD PRESSURE: 60 MMHG | RESPIRATION RATE: 16 BRPM | OXYGEN SATURATION: 100 % | HEART RATE: 80 BPM | SYSTOLIC BLOOD PRESSURE: 103 MMHG | TEMPERATURE: 97.5 F | BODY MASS INDEX: 25.61 KG/M2 | WEIGHT: 163.14 LBS | HEIGHT: 67 IN

## 2024-01-18 DIAGNOSIS — N18.9 CHRONIC KIDNEY DISEASE, UNSPECIFIED CKD STAGE: ICD-10-CM

## 2024-01-18 DIAGNOSIS — R42 LIGHTHEADEDNESS: Primary | ICD-10-CM

## 2024-01-18 DIAGNOSIS — E86.0 DEHYDRATION: ICD-10-CM

## 2024-01-18 LAB
ALBUMIN SERPL-MCNC: 3.6 G/DL (ref 3.5–5)
ALBUMIN/GLOB SERPL: 1 (ref 1.1–2.2)
ALP SERPL-CCNC: 105 U/L (ref 45–117)
ALT SERPL-CCNC: 25 U/L (ref 12–78)
ANION GAP SERPL CALC-SCNC: 5 MMOL/L (ref 5–15)
AST SERPL-CCNC: 28 U/L (ref 15–37)
BASOPHILS # BLD: 0 K/UL (ref 0–0.1)
BASOPHILS NFR BLD: 0 % (ref 0–1)
BILIRUB SERPL-MCNC: 1.7 MG/DL (ref 0.2–1)
BUN SERPL-MCNC: 36 MG/DL (ref 6–20)
BUN/CREAT SERPL: 19 (ref 12–20)
CALCIUM SERPL-MCNC: 10 MG/DL (ref 8.5–10.1)
CHLORIDE SERPL-SCNC: 111 MMOL/L (ref 97–108)
CO2 SERPL-SCNC: 24 MMOL/L (ref 21–32)
CREAT SERPL-MCNC: 1.92 MG/DL (ref 0.7–1.3)
DIFFERENTIAL METHOD BLD: ABNORMAL
EOSINOPHIL # BLD: 0 K/UL (ref 0–0.4)
EOSINOPHIL NFR BLD: 0 % (ref 0–7)
ERYTHROCYTE [DISTWIDTH] IN BLOOD BY AUTOMATED COUNT: 13.4 % (ref 11.5–14.5)
GLOBULIN SER CALC-MCNC: 3.5 G/DL (ref 2–4)
GLUCOSE SERPL-MCNC: 94 MG/DL (ref 65–100)
HCT VFR BLD AUTO: 36.2 % (ref 36.6–50.3)
HGB BLD-MCNC: 11.8 G/DL (ref 12.1–17)
IMM GRANULOCYTES # BLD AUTO: 0.1 K/UL (ref 0–0.04)
IMM GRANULOCYTES NFR BLD AUTO: 1 % (ref 0–0.5)
LYMPHOCYTES # BLD: 1.6 K/UL (ref 0.8–3.5)
LYMPHOCYTES NFR BLD: 12 % (ref 12–49)
MAGNESIUM SERPL-MCNC: 2 MG/DL (ref 1.6–2.4)
MCH RBC QN AUTO: 26.7 PG (ref 26–34)
MCHC RBC AUTO-ENTMCNC: 32.6 G/DL (ref 30–36.5)
MCV RBC AUTO: 81.9 FL (ref 80–99)
MONOCYTES # BLD: 1.4 K/UL (ref 0–1)
MONOCYTES NFR BLD: 11 % (ref 5–13)
NEUTS SEG # BLD: 9.8 K/UL (ref 1.8–8)
NEUTS SEG NFR BLD: 76 % (ref 32–75)
NRBC # BLD: 0 K/UL (ref 0–0.01)
NRBC BLD-RTO: 0 PER 100 WBC
PLATELET # BLD AUTO: 227 K/UL (ref 150–400)
PMV BLD AUTO: 9.9 FL (ref 8.9–12.9)
POTASSIUM SERPL-SCNC: 4.6 MMOL/L (ref 3.5–5.1)
PROT SERPL-MCNC: 7.1 G/DL (ref 6.4–8.2)
RBC # BLD AUTO: 4.42 M/UL (ref 4.1–5.7)
SODIUM SERPL-SCNC: 140 MMOL/L (ref 136–145)
TROPONIN I SERPL HS-MCNC: 12 NG/L (ref 0–76)
TROPONIN I SERPL HS-MCNC: 13 NG/L (ref 0–76)
WBC # BLD AUTO: 12.9 K/UL (ref 4.1–11.1)

## 2024-01-18 PROCEDURE — 83735 ASSAY OF MAGNESIUM: CPT

## 2024-01-18 PROCEDURE — 71046 X-RAY EXAM CHEST 2 VIEWS: CPT

## 2024-01-18 PROCEDURE — 96360 HYDRATION IV INFUSION INIT: CPT

## 2024-01-18 PROCEDURE — 85025 COMPLETE CBC W/AUTO DIFF WBC: CPT

## 2024-01-18 PROCEDURE — 94761 N-INVAS EAR/PLS OXIMETRY MLT: CPT

## 2024-01-18 PROCEDURE — 80053 COMPREHEN METABOLIC PANEL: CPT

## 2024-01-18 PROCEDURE — 36415 COLL VENOUS BLD VENIPUNCTURE: CPT

## 2024-01-18 PROCEDURE — 99285 EMERGENCY DEPT VISIT HI MDM: CPT

## 2024-01-18 PROCEDURE — 84484 ASSAY OF TROPONIN QUANT: CPT

## 2024-01-18 PROCEDURE — 81001 URINALYSIS AUTO W/SCOPE: CPT

## 2024-01-18 PROCEDURE — 93005 ELECTROCARDIOGRAM TRACING: CPT | Performed by: EMERGENCY MEDICINE

## 2024-01-18 PROCEDURE — 2580000003 HC RX 258: Performed by: EMERGENCY MEDICINE

## 2024-01-18 RX ORDER — 0.9 % SODIUM CHLORIDE 0.9 %
1000 INTRAVENOUS SOLUTION INTRAVENOUS ONCE
Status: COMPLETED | OUTPATIENT
Start: 2024-01-18 | End: 2024-01-18

## 2024-01-18 RX ADMIN — SODIUM CHLORIDE 1000 ML: 9 INJECTION, SOLUTION INTRAVENOUS at 22:31

## 2024-01-18 ASSESSMENT — PAIN SCALES - GENERAL: PAINLEVEL_OUTOF10: 0

## 2024-01-18 ASSESSMENT — PAIN - FUNCTIONAL ASSESSMENT: PAIN_FUNCTIONAL_ASSESSMENT: 0-10

## 2024-01-19 LAB
APPEARANCE UR: CLEAR
BACTERIA URNS QL MICRO: NEGATIVE /HPF
BILIRUB UR QL: NEGATIVE
COLOR UR: ABNORMAL
EKG ATRIAL RATE: 75 BPM
EKG DIAGNOSIS: NORMAL
EKG P AXIS: 86 DEGREES
EKG P-R INTERVAL: 154 MS
EKG Q-T INTERVAL: 408 MS
EKG QRS DURATION: 88 MS
EKG QTC CALCULATION (BAZETT): 455 MS
EKG R AXIS: 81 DEGREES
EKG T AXIS: 87 DEGREES
EKG VENTRICULAR RATE: 75 BPM
EPITH CASTS URNS QL MICRO: ABNORMAL /LPF
GLUCOSE UR STRIP.AUTO-MCNC: NEGATIVE MG/DL
HGB UR QL STRIP: NEGATIVE
HYALINE CASTS URNS QL MICRO: ABNORMAL /LPF (ref 0–5)
KETONES UR QL STRIP.AUTO: ABNORMAL MG/DL
LEUKOCYTE ESTERASE UR QL STRIP.AUTO: NEGATIVE
NITRITE UR QL STRIP.AUTO: NEGATIVE
PH UR STRIP: 5 (ref 5–8)
PROT UR STRIP-MCNC: NEGATIVE MG/DL
RBC #/AREA URNS HPF: ABNORMAL /HPF (ref 0–5)
SP GR UR REFRACTOMETRY: 1.01
URINE CULTURE IF INDICATED: ABNORMAL
UROBILINOGEN UR QL STRIP.AUTO: 1 EU/DL (ref 0.2–1)
WBC URNS QL MICRO: ABNORMAL /HPF (ref 0–4)

## 2024-01-19 NOTE — ED NOTES
Patient discharged from the ED by Radha EVANS. Diagnosis, medications, precautions and follow-ups were reviewed with the patient/family. Questions were asked and answered prior to departure. Patient departed the ED via wheelchair and was accompanied by RN and family.

## 2024-01-19 NOTE — DISCHARGE INSTRUCTIONS
It was a pleasure taking care of you in our Emergency Department today.  We know that when you come to [facility], you are entrusting us with your health, comfort, and safety.  Our physicians and nurses honor that trust, and truly appreciate the opportunity to care for you and your loved ones.    We also value your feedback.  If you receive a survey about your Emergency Department experience today, please fill it out.  We care about our patients' feedback, and we listen to what you have to say.  Thank you!       --- Dr. Steffanie Miramontes MD

## 2024-01-23 ASSESSMENT — ENCOUNTER SYMPTOMS
NAUSEA: 0
BACK PAIN: 0
COUGH: 0
VOMITING: 0
COLOR CHANGE: 0
ABDOMINAL PAIN: 0
SHORTNESS OF BREATH: 0

## 2024-01-23 NOTE — ED PROVIDER NOTES
for cardiovascular disease, pulmonary disease and/or metabolic abnormalities.       CC/HPI Summary, DDx, MDM, ED Course, and Reassessment:   82 yo male with history that includes ckd, htn, presents to the ED after an episode of lightheadedness today. He was working outside around his house, went into the house and at that time noticed that he feels very lightheaded. Denies ever having had cp, sob, palpitations. Did not fall or loose consciousness. Once inside he sat down got some water and then felt better.   He is hemodynamically stable in the ed. No fever. Denies any recent illness. He now feels back to baseline. No focal weakness or numbness.   Physical exam is only notable for dry mucus membranes, otherwise normal, nih stroke scale is 0.    Based on history, clinical gestalt and physical exam, I suspect dehydration and orthostatic lightheadedness. Prior to onset of symptoms he was sitting down and working. Cardiac arrhythmia possible but malignant arrhythmia unlikely given retention of consciousness. Acs less likely but will get serial troponins. Given dehydration, electrolyte abnormalities or nabeel possible.   Will get ecg, cbc, cmp, serial troponins, cxr, ua.  In meantime provide iv fluids. Close monitoring and reassess.     I personally reviewed and interpreted the patient's available laboratory studies, which demonstrate:  - na 140, k 4.6, cr 1.92 (baseline)  - serial troponins normal 12 and 13  -lfts normal  - glucose 94  - wbc 12.9, hgb 11.8  - ua did not reflex to cx    I personally reviewed and interpreted the patient's available imaging studies, which demonstrate  cxr reviewed as soon as images were available. No acute findings .       Progress Note:   I have re-examined the patient.   Labs, ua, cxr show no acute abnormalities. ECG is normal. Trop x 2 negative.  I engaged patient in shared decision making. Patient emphasizes significant improvement after ED treatment and is comfortable with discharge

## 2024-05-04 ENCOUNTER — APPOINTMENT (OUTPATIENT)
Facility: HOSPITAL | Age: 84
End: 2024-05-04
Payer: MEDICARE

## 2024-05-04 ENCOUNTER — HOSPITAL ENCOUNTER (EMERGENCY)
Facility: HOSPITAL | Age: 84
Discharge: HOME | End: 2024-05-04
Attending: EMERGENCY MEDICINE
Payer: MEDICARE

## 2024-05-04 VITALS
TEMPERATURE: 97.7 F | HEART RATE: 62 BPM | RESPIRATION RATE: 11 BRPM | DIASTOLIC BLOOD PRESSURE: 60 MMHG | WEIGHT: 155.2 LBS | BODY MASS INDEX: 24.36 KG/M2 | OXYGEN SATURATION: 95 % | HEIGHT: 67 IN | SYSTOLIC BLOOD PRESSURE: 116 MMHG

## 2024-05-04 DIAGNOSIS — K40.20 BILATERAL INGUINAL HERNIA WITHOUT OBSTRUCTION OR GANGRENE, RECURRENCE NOT SPECIFIED: ICD-10-CM

## 2024-05-04 DIAGNOSIS — R10.33 PERIUMBILICAL ABDOMINAL PAIN: Primary | ICD-10-CM

## 2024-05-04 DIAGNOSIS — K42.9 UMBILICAL HERNIA WITHOUT OBSTRUCTION AND WITHOUT GANGRENE: ICD-10-CM

## 2024-05-04 LAB
ALBUMIN SERPL-MCNC: 3.5 G/DL (ref 3.5–5)
ALBUMIN/GLOB SERPL: 1.1 (ref 1.1–2.2)
ALP SERPL-CCNC: 88 U/L (ref 45–117)
ALT SERPL-CCNC: 26 U/L (ref 12–78)
ANION GAP SERPL CALC-SCNC: 6 MMOL/L (ref 5–15)
AST SERPL-CCNC: 25 U/L (ref 15–37)
BASOPHILS # BLD: 0 K/UL (ref 0–0.1)
BASOPHILS NFR BLD: 0 % (ref 0–1)
BILIRUB SERPL-MCNC: 1.1 MG/DL (ref 0.2–1)
BUN SERPL-MCNC: 32 MG/DL (ref 6–20)
BUN/CREAT SERPL: 17 (ref 12–20)
CALCIUM SERPL-MCNC: 9.8 MG/DL (ref 8.5–10.1)
CHLORIDE SERPL-SCNC: 110 MMOL/L (ref 97–108)
CO2 SERPL-SCNC: 24 MMOL/L (ref 21–32)
CREAT SERPL-MCNC: 1.89 MG/DL (ref 0.7–1.3)
DIFFERENTIAL METHOD BLD: ABNORMAL
EOSINOPHIL # BLD: 0 K/UL (ref 0–0.4)
EOSINOPHIL NFR BLD: 0 % (ref 0–7)
ERYTHROCYTE [DISTWIDTH] IN BLOOD BY AUTOMATED COUNT: 13.4 % (ref 11.5–14.5)
GLOBULIN SER CALC-MCNC: 3.3 G/DL (ref 2–4)
GLUCOSE SERPL-MCNC: 113 MG/DL (ref 65–100)
HCT VFR BLD AUTO: 35.7 % (ref 36.6–50.3)
HGB BLD-MCNC: 11.6 G/DL (ref 12.1–17)
IMM GRANULOCYTES # BLD AUTO: 0.1 K/UL (ref 0–0.04)
IMM GRANULOCYTES NFR BLD AUTO: 0 % (ref 0–0.5)
LACTATE BLD-SCNC: 1.52 MMOL/L (ref 0.4–2)
LIPASE SERPL-CCNC: 55 U/L (ref 13–75)
LYMPHOCYTES # BLD: 1.2 K/UL (ref 0.8–3.5)
LYMPHOCYTES NFR BLD: 11 % (ref 12–49)
MCH RBC QN AUTO: 26.9 PG (ref 26–34)
MCHC RBC AUTO-ENTMCNC: 32.5 G/DL (ref 30–36.5)
MCV RBC AUTO: 82.6 FL (ref 80–99)
MONOCYTES # BLD: 0.8 K/UL (ref 0–1)
MONOCYTES NFR BLD: 7 % (ref 5–13)
NEUTS SEG # BLD: 9.1 K/UL (ref 1.8–8)
NEUTS SEG NFR BLD: 82 % (ref 32–75)
NRBC # BLD: 0 K/UL (ref 0–0.01)
NRBC BLD-RTO: 0 PER 100 WBC
PLATELET # BLD AUTO: 234 K/UL (ref 150–400)
PMV BLD AUTO: 10.1 FL (ref 8.9–12.9)
POTASSIUM SERPL-SCNC: 4.2 MMOL/L (ref 3.5–5.1)
PROT SERPL-MCNC: 6.8 G/DL (ref 6.4–8.2)
RBC # BLD AUTO: 4.32 M/UL (ref 4.1–5.7)
SODIUM SERPL-SCNC: 140 MMOL/L (ref 136–145)
WBC # BLD AUTO: 11.2 K/UL (ref 4.1–11.1)

## 2024-05-04 PROCEDURE — 74176 CT ABD & PELVIS W/O CONTRAST: CPT

## 2024-05-04 PROCEDURE — 96374 THER/PROPH/DIAG INJ IV PUSH: CPT

## 2024-05-04 PROCEDURE — 80053 COMPREHEN METABOLIC PANEL: CPT

## 2024-05-04 PROCEDURE — 36415 COLL VENOUS BLD VENIPUNCTURE: CPT

## 2024-05-04 PROCEDURE — 6360000002 HC RX W HCPCS: Performed by: EMERGENCY MEDICINE

## 2024-05-04 PROCEDURE — 83690 ASSAY OF LIPASE: CPT

## 2024-05-04 PROCEDURE — 99284 EMERGENCY DEPT VISIT MOD MDM: CPT

## 2024-05-04 PROCEDURE — 96375 TX/PRO/DX INJ NEW DRUG ADDON: CPT

## 2024-05-04 PROCEDURE — 85025 COMPLETE CBC W/AUTO DIFF WBC: CPT

## 2024-05-04 PROCEDURE — 83605 ASSAY OF LACTIC ACID: CPT

## 2024-05-04 RX ORDER — MORPHINE SULFATE 2 MG/ML
2 INJECTION, SOLUTION INTRAMUSCULAR; INTRAVENOUS
Status: COMPLETED | OUTPATIENT
Start: 2024-05-04 | End: 2024-05-04

## 2024-05-04 RX ORDER — ONDANSETRON 2 MG/ML
4 INJECTION INTRAMUSCULAR; INTRAVENOUS ONCE
Status: COMPLETED | OUTPATIENT
Start: 2024-05-04 | End: 2024-05-04

## 2024-05-04 RX ORDER — MIDAZOLAM HYDROCHLORIDE 5 MG/5ML
2 INJECTION, SOLUTION INTRAMUSCULAR; INTRAVENOUS ONCE
Status: COMPLETED | OUTPATIENT
Start: 2024-05-04 | End: 2024-05-04

## 2024-05-04 RX ORDER — DICYCLOMINE HYDROCHLORIDE 10 MG/1
10 CAPSULE ORAL 4 TIMES DAILY PRN
Qty: 120 CAPSULE | Refills: 0 | Status: SHIPPED | OUTPATIENT
Start: 2024-05-04

## 2024-05-04 RX ADMIN — MIDAZOLAM 2 MG: 1 INJECTION INTRAMUSCULAR; INTRAVENOUS at 17:36

## 2024-05-04 RX ADMIN — ONDANSETRON 4 MG: 2 INJECTION INTRAMUSCULAR; INTRAVENOUS at 17:36

## 2024-05-04 RX ADMIN — MORPHINE SULFATE 2 MG: 2 INJECTION, SOLUTION INTRAMUSCULAR; INTRAVENOUS at 17:36

## 2024-05-04 ASSESSMENT — LIFESTYLE VARIABLES
HOW OFTEN DO YOU HAVE A DRINK CONTAINING ALCOHOL: NEVER
HOW MANY STANDARD DRINKS CONTAINING ALCOHOL DO YOU HAVE ON A TYPICAL DAY: PATIENT DOES NOT DRINK

## 2024-05-04 ASSESSMENT — PAIN SCALES - GENERAL: PAINLEVEL_OUTOF10: 9

## 2024-05-04 NOTE — ED PROVIDER NOTES
[facility] EMERGENCY DEPT  EMERGENCY DEPARTMENT ENCOUNTER       Pt Name: Alexandr Lewis  MRN: [id number]  Birthdate 1940  Date of evaluation: 5/4/2024  Provider: Milan Lewis MD   PCP: None, None (Inactive)  Note Started: 5:40 PM EDT 5/4/24     CHIEF COMPLAINT       Chief Complaint   Patient presents with    Abdominal Pain     Middle to lower \"hernia\" pain \"very bad\". Pain is off and on. Denies vomiting or diarrhea.         HISTORY OF PRESENT ILLNESS: 1 or more elements      History From: Patient, History limited by: none     Alexandr Lewis is a 83 y.o. male patient with history of hypertension, prostate cancer and prior hernia repairs, here for abdominal pain.  He says his pain started this morning.  It has been intermittent, and at times is severe.  He denies nausea or vomiting.  Denies change in bowel habits or dysuria.  Denies chest pain or shortness of breath.  He did not take anything for pain prior to arrival.       Please See MDM for Additional Details of the HPI/PMH  Nursing Notes were all reviewed and agreed with or any disagreements were addressed in the HPI.     REVIEW OF SYSTEMS        Positives and Pertinent negatives as per HPI.    PAST HISTORY     Past Medical History:  Past Medical History:   Diagnosis Date    Arthritis     Cancer ([facility])     PROSTATE    Hypertension     Other ill-defined conditions(799.89)     elevated cholesterol       Past Surgical History:  Past Surgical History:   Procedure Laterality Date    COLONOSCOPY,DIAGNOSTIC  9/12/2012         ORTHOPEDIC SURGERY  2000    L SHOULDER-UNKNOWN PROCEDURE    PROSTATE BIOPSY, NEEDLE, SATURATION SAMPLING      PROSTATECTOMY         Family History:  Family History   Problem Relation Age of Onset    Heart Disease Mother     Heart Disease Father     Heart Disease Brother     Heart Disease Other        Social History:  Social History     Tobacco Use    Smoking status: Former     Current packs/day: 0.00     Types: Cigarettes     Quit date: 9/10/1980

## 2024-06-18 ENCOUNTER — TELEPHONE (OUTPATIENT)
Age: 84
End: 2024-06-18

## 2024-06-18 NOTE — TELEPHONE ENCOUNTER
Called to schedule consult, patient answered and states their nephew will call to schedule, they handle his appointments, asked patient for their phone number patient did not provide, waiting for nephew to call    NP unspecified hernia, kathe sams, medicare, notes in folder [phone]

## 2024-07-01 ENCOUNTER — PREP FOR PROCEDURE (OUTPATIENT)
Age: 84
End: 2024-07-01

## 2024-07-01 ENCOUNTER — OFFICE VISIT (OUTPATIENT)
Age: 84
End: 2024-07-01
Payer: MEDICARE

## 2024-07-01 VITALS
WEIGHT: 150 LBS | HEART RATE: 105 BPM | HEIGHT: 67 IN | DIASTOLIC BLOOD PRESSURE: 64 MMHG | TEMPERATURE: 97.9 F | RESPIRATION RATE: 17 BRPM | OXYGEN SATURATION: 98 % | BODY MASS INDEX: 23.54 KG/M2 | SYSTOLIC BLOOD PRESSURE: 103 MMHG

## 2024-07-01 DIAGNOSIS — K43.9 ABDOMINAL WALL HERNIA: ICD-10-CM

## 2024-07-01 DIAGNOSIS — K40.20 BILATERAL INGUINAL HERNIA WITHOUT OBSTRUCTION OR GANGRENE, RECURRENCE NOT SPECIFIED: Primary | ICD-10-CM

## 2024-07-01 PROCEDURE — 99204 OFFICE O/P NEW MOD 45 MIN: CPT | Performed by: SURGERY

## 2024-07-01 PROCEDURE — G8427 DOCREV CUR MEDS BY ELIG CLIN: HCPCS | Performed by: SURGERY

## 2024-07-01 PROCEDURE — 1123F ACP DISCUSS/DSCN MKR DOCD: CPT | Performed by: SURGERY

## 2024-07-01 PROCEDURE — G8420 CALC BMI NORM PARAMETERS: HCPCS | Performed by: SURGERY

## 2024-07-01 PROCEDURE — 1036F TOBACCO NON-USER: CPT | Performed by: SURGERY

## 2024-07-01 ASSESSMENT — PATIENT HEALTH QUESTIONNAIRE - PHQ9
1. LITTLE INTEREST OR PLEASURE IN DOING THINGS: NOT AT ALL
SUM OF ALL RESPONSES TO PHQ QUESTIONS 1-9: 0
2. FEELING DOWN, DEPRESSED OR HOPELESS: NOT AT ALL
SUM OF ALL RESPONSES TO PHQ QUESTIONS 1-9: 0
SUM OF ALL RESPONSES TO PHQ9 QUESTIONS 1 & 2: 0

## 2024-07-01 ASSESSMENT — ENCOUNTER SYMPTOMS
EYE PAIN: 0
SHORTNESS OF BREATH: 0
BLOOD IN STOOL: 0

## 2024-07-01 NOTE — PROGRESS NOTES
Alexandr Lewis (:  1940) is a 84 y.o. male, here for evaluation of the following chief complaint(s):  New Patient (Seen at the request of DEXTER Lewis for evaluation of bilateral hernia)      Assessment & Plan   ASSESSMENT/PLAN:  1. Bilateral inguinal hernia without obstruction or gangrene, recurrence not specified  2. Abdominal wall hernia      Alexandr Lewis is having symptoms.  I have recommended to him that we proceed with surgery.    I had an extensive discussion with him and his niece regarding the risks, benefits, and alternatives of proceeding with a Laparoscopic bilateral Inguinal Hernia Repair with Mesh, Robot Assisted, and abdominal wall hernia repair with possible mesh.  Risks,benefits, and alternatives were discussed including the risk of anesthesia, bleeding, infection, including mesh infection, chronic orchialgia, neuralgia, other pain syndromes, testicular ischemia, conversion to open, injury to bowel, and recurrence were discussed.    I reviewed with Alexandr Lewis his increased risk of bleeding secondary to Asprin use.  I have asked him to discontinue the Asprin for 2 days prior to surgery to reduce this risk.    He is in agreement to proceed.    All questions were answered.    We will schedule him at his earliest convenience.    Thank you for this consult.          Subjective   HPI:  HPI    Here with his niece    In  had a laparotomy with lysis of adhesions with Dr. Cameron.     Progressively worsening lower abdominal pain right greater than left.  Decreased appetite with weight loss.  Bowel movements okay.    Reviewed and independently interpreted CT images from 2024  Large right inguinal hernia containing small bowel.  Moderate left inguinal hernia containing colon.  Very small epigastric hernia.    Not eating very well    Cr up a little. +wt loss    History of prostate cancer      Review of Systems   Constitutional:  Negative for chills, fever and unexpected weight change.   HENT:

## 2024-07-01 NOTE — PROGRESS NOTES
Identified pt with two pt identifiers (name and ). Reviewed chart in preparation for visit and have obtained necessary documentation.    Alexandr Lewis is a 84 y.o. male New Patient (Seen at the request of DEXTER Lewis for evaluation of bilateral hernia)  .    Vitals:    24 1545   BP: 103/64   Site: Left Upper Arm   Position: Sitting   Cuff Size: Medium Adult   Pulse: (!) 105   Resp: 17   Temp: 97.9 °F (36.6 °C)   TempSrc: Temporal   SpO2: 98%   Weight: 68 kg (150 lb)   Height: 1.702 m (5' 7\")          1. Have you been to the ER, urgent care clinic since your last visit?  Hospitalized since your last visit?  no     2. Have you seen or consulted any other health care providers outside of the [facility] since your last visit?  Include any pap smears or colon screening.  no

## 2024-07-17 ENCOUNTER — TELEPHONE (OUTPATIENT)
Age: 84
End: 2024-07-17

## 2024-07-17 ENCOUNTER — HOSPITAL ENCOUNTER (OUTPATIENT)
Facility: HOSPITAL | Age: 84
Discharge: HOME | End: 2024-07-20
Payer: MEDICARE

## 2024-07-17 VITALS
BODY MASS INDEX: 23.88 KG/M2 | OXYGEN SATURATION: 100 % | HEIGHT: 66 IN | TEMPERATURE: 97.4 F | DIASTOLIC BLOOD PRESSURE: 58 MMHG | HEART RATE: 64 BPM | SYSTOLIC BLOOD PRESSURE: 131 MMHG | WEIGHT: 148.59 LBS | RESPIRATION RATE: 20 BRPM

## 2024-07-17 LAB
ABO + RH BLD: NORMAL
ALBUMIN SERPL-MCNC: 2.6 G/DL (ref 3.5–5)
ALBUMIN/GLOB SERPL: 0.7 (ref 1.1–2.2)
ALP SERPL-CCNC: 74 U/L (ref 45–117)
ALT SERPL-CCNC: 90 U/L (ref 12–78)
ANION GAP SERPL CALC-SCNC: 4 MMOL/L (ref 5–15)
APTT PPP: 22.2 SEC (ref 22.1–31)
AST SERPL-CCNC: 81 U/L (ref 15–37)
BILIRUB SERPL-MCNC: 0.6 MG/DL (ref 0.2–1)
BLOOD GROUP ANTIBODIES SERPL: NORMAL
BUN SERPL-MCNC: 48 MG/DL (ref 6–20)
BUN/CREAT SERPL: 26 (ref 12–20)
CALCIUM SERPL-MCNC: 8.8 MG/DL (ref 8.5–10.1)
CHLORIDE SERPL-SCNC: 110 MMOL/L (ref 97–108)
CO2 SERPL-SCNC: 26 MMOL/L (ref 21–32)
CREAT SERPL-MCNC: 1.85 MG/DL (ref 0.7–1.3)
ERYTHROCYTE [DISTWIDTH] IN BLOOD BY AUTOMATED COUNT: 14.6 % (ref 11.5–14.5)
GLOBULIN SER CALC-MCNC: 3.6 G/DL (ref 2–4)
GLUCOSE SERPL-MCNC: 99 MG/DL (ref 65–100)
HCT VFR BLD AUTO: 28.7 % (ref 36.6–50.3)
HGB BLD-MCNC: 9.1 G/DL (ref 12.1–17)
INR PPP: 1.2 (ref 0.9–1.1)
MCH RBC QN AUTO: 26.5 PG (ref 26–34)
MCHC RBC AUTO-ENTMCNC: 31.7 G/DL (ref 30–36.5)
MCV RBC AUTO: 83.4 FL (ref 80–99)
NRBC # BLD: 0 K/UL (ref 0–0.01)
NRBC BLD-RTO: 0 PER 100 WBC
PLATELET # BLD AUTO: 228 K/UL (ref 150–400)
PMV BLD AUTO: 10.3 FL (ref 8.9–12.9)
POTASSIUM SERPL-SCNC: 5.4 MMOL/L (ref 3.5–5.1)
PROT SERPL-MCNC: 6.2 G/DL (ref 6.4–8.2)
PROTHROMBIN TIME: 11.9 SEC (ref 9–11.1)
RBC # BLD AUTO: 3.44 M/UL (ref 4.1–5.7)
SODIUM SERPL-SCNC: 140 MMOL/L (ref 136–145)
SPECIMEN EXP DATE BLD: NORMAL
THERAPEUTIC RANGE: NORMAL SECS (ref 58–77)
WBC # BLD AUTO: 11.3 K/UL (ref 4.1–11.1)

## 2024-07-17 PROCEDURE — 85730 THROMBOPLASTIN TIME PARTIAL: CPT

## 2024-07-17 PROCEDURE — 85027 COMPLETE CBC AUTOMATED: CPT

## 2024-07-17 PROCEDURE — 86900 BLOOD TYPING SEROLOGIC ABO: CPT

## 2024-07-17 PROCEDURE — 36415 COLL VENOUS BLD VENIPUNCTURE: CPT

## 2024-07-17 PROCEDURE — 85610 PROTHROMBIN TIME: CPT

## 2024-07-17 PROCEDURE — 86901 BLOOD TYPING SEROLOGIC RH(D): CPT

## 2024-07-17 PROCEDURE — 86850 RBC ANTIBODY SCREEN: CPT

## 2024-07-17 PROCEDURE — 80053 COMPREHEN METABOLIC PANEL: CPT

## 2024-07-17 RX ORDER — SODIUM CHLORIDE, SODIUM LACTATE, POTASSIUM CHLORIDE, CALCIUM CHLORIDE 600; 310; 30; 20 MG/100ML; MG/100ML; MG/100ML; MG/100ML
INJECTION, SOLUTION INTRAVENOUS CONTINUOUS
OUTPATIENT
Start: 2024-07-23

## 2024-07-17 RX ORDER — FERROUS SULFATE 325(65) MG
325 TABLET ORAL
COMMUNITY

## 2024-07-17 NOTE — TELEPHONE ENCOUNTER
Alexandra Haney called back and said patient is having labs drawn on Friday, 7/19/24. She will see his PCP on Monday 7/22 at 1120 and they will let us know if he is cleared for surgery

## 2024-07-17 NOTE — TELEPHONE ENCOUNTER
Spoke with patient to see if he could go see his PCP for possible hyperkalemia. He requested that I speak with Ms Haney because she handles all of his appts.  I tried to reach Ms Haney to let her know that Dr Pryor would like for Mr Lewis to she is PCP for evaluation and treatment of possible hyperkalemia. He would need to be seen and evaluated before his surgery on 7/23/24.   I had to leave a voicemail for her to call back

## 2024-07-17 NOTE — PROGRESS NOTES
[facility]  Preoperative Instructions        Surgery Date 07/23/24          Time of Arrival to be called on 07/22/24 after 2 pm  Contact: [phone] Alexandra-niece-patient KYLE  On the day of your surgery, please report to Surgical Services Registration Desk and sign in at your designated time.  The Surgery Center is located to the right of the Emergency Room.     2. You must have someone with you to drive you home. You should not drive a car for 24 hours following surgery. Please make arrangements for a friend or family member to stay with you for the first 24 hours after your surgery.    3. Do not have anything to eat or drink (including water, gum, mints, coffee, juice) after midnight 07/22/24 .?This may not apply to medications prescribed by your physician. ?(Please note below the special instructions with medications to take the morning of your procedure.)    4. We recommend you do not drink any alcoholic beverages for 24 hours before and after your surgery.    5. Contact your surgeon's office for instructions on the following medications: non-steroidal anti-inflammatory drugs (i.e. Advil, Aleve), vitamins, and supplements. (Some surgeon's will want you to stop these medications prior to surgery and others may allow you to take them)  **If you are currently taking Plavix, Coumadin, Aspirin and/or other blood-thinning agents, contact your surgeon for instructions.** Your surgeon will partner with the physician prescribing these medications to determine if it is safe to stop or if you need to continue taking.  Please do not stop taking these medications without instructions from your surgeon    6. Wear comfortable clothes.  Wear glasses instead of contacts.  Do not bring any money or jewelry. Please bring picture ID, insurance card, and any prearranged co-payment or hospital payment.  Do not wear make-up, particularly mascara the morning of your surgery.  Do not wear nail polish, particularly

## 2024-07-17 NOTE — TELEPHONE ENCOUNTER
Could you have him f/u with his PCP for repeat labs / possibly treat hyperkalemia.     Thanks.

## 2024-07-22 ENCOUNTER — TELEPHONE (OUTPATIENT)
Age: 84
End: 2024-07-22

## 2024-07-22 ENCOUNTER — ANESTHESIA EVENT (OUTPATIENT)
Facility: HOSPITAL | Age: 84
End: 2024-07-22
Payer: MEDICARE

## 2024-07-23 ENCOUNTER — HOSPITAL ENCOUNTER (OUTPATIENT)
Facility: HOSPITAL | Age: 84
Setting detail: OBSERVATION
Discharge: HOME | End: 2024-07-24
Attending: SURGERY | Admitting: SURGERY
Payer: MEDICARE

## 2024-07-23 ENCOUNTER — ANESTHESIA (OUTPATIENT)
Facility: HOSPITAL | Age: 84
End: 2024-07-23
Payer: MEDICARE

## 2024-07-23 DIAGNOSIS — K43.9 ABDOMINAL WALL HERNIA: Primary | ICD-10-CM

## 2024-07-23 PROCEDURE — 6370000000 HC RX 637 (ALT 250 FOR IP): Performed by: SURGERY

## 2024-07-23 PROCEDURE — 2709999900 HC NON-CHARGEABLE SUPPLY: Performed by: SURGERY

## 2024-07-23 PROCEDURE — 49594 RPR AA HRN 1ST 3-10 NCR/STRN: CPT | Performed by: SURGERY

## 2024-07-23 PROCEDURE — 6360000002 HC RX W HCPCS: Performed by: SURGERY

## 2024-07-23 PROCEDURE — 88302 TISSUE EXAM BY PATHOLOGIST: CPT

## 2024-07-23 PROCEDURE — 7100000001 HC PACU RECOVERY - ADDTL 15 MIN: Performed by: SURGERY

## 2024-07-23 PROCEDURE — G0378 HOSPITAL OBSERVATION PER HR: HCPCS

## 2024-07-23 PROCEDURE — C1781 MESH (IMPLANTABLE): HCPCS | Performed by: SURGERY

## 2024-07-23 PROCEDURE — 6370000000 HC RX 637 (ALT 250 FOR IP): Performed by: ANESTHESIOLOGY

## 2024-07-23 PROCEDURE — 2720000010 HC SURG SUPPLY STERILE: Performed by: SURGERY

## 2024-07-23 PROCEDURE — 3700000000 HC ANESTHESIA ATTENDED CARE: Performed by: SURGERY

## 2024-07-23 PROCEDURE — 6360000002 HC RX W HCPCS

## 2024-07-23 PROCEDURE — 3700000001 HC ADD 15 MINUTES (ANESTHESIA): Performed by: SURGERY

## 2024-07-23 PROCEDURE — 2500000003 HC RX 250 WO HCPCS

## 2024-07-23 PROCEDURE — 7100000000 HC PACU RECOVERY - FIRST 15 MIN: Performed by: SURGERY

## 2024-07-23 PROCEDURE — 2500000003 HC RX 250 WO HCPCS: Performed by: SURGERY

## 2024-07-23 PROCEDURE — 2580000003 HC RX 258

## 2024-07-23 PROCEDURE — 3600000019 HC SURGERY ROBOT ADDTL 15MIN: Performed by: SURGERY

## 2024-07-23 PROCEDURE — S2900 ROBOTIC SURGICAL SYSTEM: HCPCS | Performed by: SURGERY

## 2024-07-23 PROCEDURE — 2580000003 HC RX 258: Performed by: SURGERY

## 2024-07-23 PROCEDURE — 3600000009 HC SURGERY ROBOT BASE: Performed by: SURGERY

## 2024-07-23 PROCEDURE — 49650 LAP ING HERNIA REPAIR INIT: CPT | Performed by: SURGERY

## 2024-07-23 PROCEDURE — 2580000003 HC RX 258: Performed by: ANESTHESIOLOGY

## 2024-07-23 DEVICE — GORE SYNECOR PREPERITONEAL 12CMX15CMRECTANGLE BIOMATERIAL
Type: IMPLANTABLE DEVICE | Site: INGUINAL | Status: FUNCTIONAL
Brand: GORE SYNECOR PREPERITONEAL BIOMATERIAL

## 2024-07-23 RX ORDER — OXYCODONE HYDROCHLORIDE 5 MG/1
5 TABLET ORAL EVERY 4 HOURS PRN
Status: DISCONTINUED | OUTPATIENT
Start: 2024-07-23 | End: 2024-07-24 | Stop reason: HOSPADM

## 2024-07-23 RX ORDER — FENTANYL CITRATE 50 UG/ML
100 INJECTION, SOLUTION INTRAMUSCULAR; INTRAVENOUS
Status: DISCONTINUED | OUTPATIENT
Start: 2024-07-23 | End: 2024-07-23 | Stop reason: HOSPADM

## 2024-07-23 RX ORDER — HYDROMORPHONE HYDROCHLORIDE 1 MG/ML
0.25 INJECTION, SOLUTION INTRAMUSCULAR; INTRAVENOUS; SUBCUTANEOUS
Status: DISCONTINUED | OUTPATIENT
Start: 2024-07-23 | End: 2024-07-24 | Stop reason: HOSPADM

## 2024-07-23 RX ORDER — SODIUM CHLORIDE 9 MG/ML
INJECTION, SOLUTION INTRAVENOUS PRN
Status: DISCONTINUED | OUTPATIENT
Start: 2024-07-23 | End: 2024-07-24 | Stop reason: HOSPADM

## 2024-07-23 RX ORDER — SODIUM CHLORIDE, SODIUM LACTATE, POTASSIUM CHLORIDE, CALCIUM CHLORIDE 600; 310; 30; 20 MG/100ML; MG/100ML; MG/100ML; MG/100ML
INJECTION, SOLUTION INTRAVENOUS CONTINUOUS
Status: DISCONTINUED | OUTPATIENT
Start: 2024-07-23 | End: 2024-07-23 | Stop reason: HOSPADM

## 2024-07-23 RX ORDER — LISINOPRIL 20 MG/1
20 TABLET ORAL DAILY
Status: DISCONTINUED | OUTPATIENT
Start: 2024-07-24 | End: 2024-07-24 | Stop reason: HOSPADM

## 2024-07-23 RX ORDER — HYDRALAZINE HYDROCHLORIDE 20 MG/ML
10 INJECTION INTRAMUSCULAR; INTRAVENOUS
Status: DISCONTINUED | OUTPATIENT
Start: 2024-07-23 | End: 2024-07-23 | Stop reason: HOSPADM

## 2024-07-23 RX ORDER — NEOSTIGMINE METHYLSULFATE 1 MG/ML
INJECTION, SOLUTION INTRAVENOUS PRN
Status: DISCONTINUED | OUTPATIENT
Start: 2024-07-23 | End: 2024-07-23 | Stop reason: SDUPTHER

## 2024-07-23 RX ORDER — OXYCODONE HYDROCHLORIDE 5 MG/1
5 TABLET ORAL
Status: DISCONTINUED | OUTPATIENT
Start: 2024-07-23 | End: 2024-07-23 | Stop reason: HOSPADM

## 2024-07-23 RX ORDER — ACETAMINOPHEN 325 MG/1
650 TABLET ORAL
Status: DISCONTINUED | OUTPATIENT
Start: 2024-07-23 | End: 2024-07-23 | Stop reason: HOSPADM

## 2024-07-23 RX ORDER — ONDANSETRON 2 MG/ML
4 INJECTION INTRAMUSCULAR; INTRAVENOUS EVERY 6 HOURS PRN
Status: DISCONTINUED | OUTPATIENT
Start: 2024-07-23 | End: 2024-07-24 | Stop reason: HOSPADM

## 2024-07-23 RX ORDER — HYDROMORPHONE HYDROCHLORIDE 1 MG/ML
0.25 INJECTION, SOLUTION INTRAMUSCULAR; INTRAVENOUS; SUBCUTANEOUS EVERY 5 MIN PRN
Status: DISCONTINUED | OUTPATIENT
Start: 2024-07-23 | End: 2024-07-23 | Stop reason: HOSPADM

## 2024-07-23 RX ORDER — SODIUM CHLORIDE 0.9 % (FLUSH) 0.9 %
5-40 SYRINGE (ML) INJECTION PRN
Status: DISCONTINUED | OUTPATIENT
Start: 2024-07-23 | End: 2024-07-24 | Stop reason: HOSPADM

## 2024-07-23 RX ORDER — DEXAMETHASONE SODIUM PHOSPHATE 4 MG/ML
4 INJECTION, SOLUTION INTRA-ARTICULAR; INTRALESIONAL; INTRAMUSCULAR; INTRAVENOUS; SOFT TISSUE
Status: DISCONTINUED | OUTPATIENT
Start: 2024-07-23 | End: 2024-07-23 | Stop reason: HOSPADM

## 2024-07-23 RX ORDER — GLYCOPYRROLATE 0.2 MG/ML
INJECTION INTRAMUSCULAR; INTRAVENOUS PRN
Status: DISCONTINUED | OUTPATIENT
Start: 2024-07-23 | End: 2024-07-23 | Stop reason: SDUPTHER

## 2024-07-23 RX ORDER — ACETAMINOPHEN 500 MG
1000 TABLET ORAL ONCE
Status: COMPLETED | OUTPATIENT
Start: 2024-07-23 | End: 2024-07-23

## 2024-07-23 RX ORDER — SODIUM CHLORIDE 0.9 % (FLUSH) 0.9 %
5-40 SYRINGE (ML) INJECTION PRN
Status: DISCONTINUED | OUTPATIENT
Start: 2024-07-23 | End: 2024-07-23 | Stop reason: HOSPADM

## 2024-07-23 RX ORDER — LISINOPRIL AND HYDROCHLOROTHIAZIDE 25; 20 MG/1; MG/1
1 TABLET ORAL DAILY
Status: DISCONTINUED | OUTPATIENT
Start: 2024-07-24 | End: 2024-07-23 | Stop reason: CLARIF

## 2024-07-23 RX ORDER — OXYCODONE HYDROCHLORIDE 5 MG/1
10 TABLET ORAL EVERY 4 HOURS PRN
Status: DISCONTINUED | OUTPATIENT
Start: 2024-07-23 | End: 2024-07-24 | Stop reason: HOSPADM

## 2024-07-23 RX ORDER — SODIUM CHLORIDE 9 MG/ML
INJECTION, SOLUTION INTRAVENOUS CONTINUOUS
Status: DISCONTINUED | OUTPATIENT
Start: 2024-07-23 | End: 2024-07-24 | Stop reason: HOSPADM

## 2024-07-23 RX ORDER — ONDANSETRON 2 MG/ML
INJECTION INTRAMUSCULAR; INTRAVENOUS PRN
Status: DISCONTINUED | OUTPATIENT
Start: 2024-07-23 | End: 2024-07-23 | Stop reason: SDUPTHER

## 2024-07-23 RX ORDER — PROCHLORPERAZINE EDISYLATE 5 MG/ML
5 INJECTION INTRAMUSCULAR; INTRAVENOUS
Status: DISCONTINUED | OUTPATIENT
Start: 2024-07-23 | End: 2024-07-23 | Stop reason: HOSPADM

## 2024-07-23 RX ORDER — ACETAMINOPHEN 325 MG/1
650 TABLET ORAL EVERY 6 HOURS
Status: DISCONTINUED | OUTPATIENT
Start: 2024-07-23 | End: 2024-07-24 | Stop reason: HOSPADM

## 2024-07-23 RX ORDER — BUPIVACAINE HYDROCHLORIDE 5 MG/ML
INJECTION, SOLUTION EPIDURAL; INTRACAUDAL PRN
Status: DISCONTINUED | OUTPATIENT
Start: 2024-07-23 | End: 2024-07-23 | Stop reason: HOSPADM

## 2024-07-23 RX ORDER — ROCURONIUM BROMIDE 10 MG/ML
INJECTION, SOLUTION INTRAVENOUS PRN
Status: DISCONTINUED | OUTPATIENT
Start: 2024-07-23 | End: 2024-07-23 | Stop reason: SDUPTHER

## 2024-07-23 RX ORDER — HYDRALAZINE HYDROCHLORIDE 20 MG/ML
10 INJECTION INTRAMUSCULAR; INTRAVENOUS EVERY 6 HOURS PRN
Status: DISCONTINUED | OUTPATIENT
Start: 2024-07-23 | End: 2024-07-24 | Stop reason: HOSPADM

## 2024-07-23 RX ORDER — SODIUM CHLORIDE 0.9 % (FLUSH) 0.9 %
5-40 SYRINGE (ML) INJECTION EVERY 12 HOURS SCHEDULED
Status: DISCONTINUED | OUTPATIENT
Start: 2024-07-23 | End: 2024-07-23 | Stop reason: HOSPADM

## 2024-07-23 RX ORDER — SODIUM CHLORIDE 9 MG/ML
INJECTION, SOLUTION INTRAVENOUS PRN
Status: DISCONTINUED | OUTPATIENT
Start: 2024-07-23 | End: 2024-07-23 | Stop reason: HOSPADM

## 2024-07-23 RX ORDER — LIDOCAINE HYDROCHLORIDE 20 MG/ML
INJECTION, SOLUTION EPIDURAL; INFILTRATION; INTRACAUDAL; PERINEURAL PRN
Status: DISCONTINUED | OUTPATIENT
Start: 2024-07-23 | End: 2024-07-23 | Stop reason: SDUPTHER

## 2024-07-23 RX ORDER — MIDAZOLAM HYDROCHLORIDE 2 MG/2ML
2 INJECTION, SOLUTION INTRAMUSCULAR; INTRAVENOUS
Status: DISCONTINUED | OUTPATIENT
Start: 2024-07-23 | End: 2024-07-23 | Stop reason: HOSPADM

## 2024-07-23 RX ORDER — ONDANSETRON 4 MG/1
4 TABLET, ORALLY DISINTEGRATING ORAL EVERY 8 HOURS PRN
Status: DISCONTINUED | OUTPATIENT
Start: 2024-07-23 | End: 2024-07-24 | Stop reason: HOSPADM

## 2024-07-23 RX ORDER — PHENYLEPHRINE HCL IN 0.9% NACL 0.4MG/10ML
SYRINGE (ML) INTRAVENOUS PRN
Status: DISCONTINUED | OUTPATIENT
Start: 2024-07-23 | End: 2024-07-23 | Stop reason: SDUPTHER

## 2024-07-23 RX ORDER — LIDOCAINE HYDROCHLORIDE AND EPINEPHRINE 10; 10 MG/ML; UG/ML
INJECTION, SOLUTION INFILTRATION; PERINEURAL PRN
Status: DISCONTINUED | OUTPATIENT
Start: 2024-07-23 | End: 2024-07-23 | Stop reason: HOSPADM

## 2024-07-23 RX ORDER — DEXAMETHASONE SODIUM PHOSPHATE 4 MG/ML
INJECTION, SOLUTION INTRA-ARTICULAR; INTRALESIONAL; INTRAMUSCULAR; INTRAVENOUS; SOFT TISSUE PRN
Status: DISCONTINUED | OUTPATIENT
Start: 2024-07-23 | End: 2024-07-23 | Stop reason: SDUPTHER

## 2024-07-23 RX ORDER — PHENYLEPHRINE HYDROCHLORIDE 10 MG/ML
INJECTION INTRAVENOUS PRN
Status: DISCONTINUED | OUTPATIENT
Start: 2024-07-23 | End: 2024-07-23

## 2024-07-23 RX ORDER — HYDROMORPHONE HYDROCHLORIDE 1 MG/ML
0.5 INJECTION, SOLUTION INTRAMUSCULAR; INTRAVENOUS; SUBCUTANEOUS
Status: DISCONTINUED | OUTPATIENT
Start: 2024-07-23 | End: 2024-07-24 | Stop reason: HOSPADM

## 2024-07-23 RX ORDER — ONDANSETRON 2 MG/ML
4 INJECTION INTRAMUSCULAR; INTRAVENOUS ONCE
Status: DISCONTINUED | OUTPATIENT
Start: 2024-07-23 | End: 2024-07-23 | Stop reason: HOSPADM

## 2024-07-23 RX ORDER — ENOXAPARIN SODIUM 100 MG/ML
30 INJECTION SUBCUTANEOUS DAILY
Status: DISCONTINUED | OUTPATIENT
Start: 2024-07-24 | End: 2024-07-24 | Stop reason: HOSPADM

## 2024-07-23 RX ORDER — NALOXONE HYDROCHLORIDE 0.4 MG/ML
INJECTION, SOLUTION INTRAMUSCULAR; INTRAVENOUS; SUBCUTANEOUS PRN
Status: DISCONTINUED | OUTPATIENT
Start: 2024-07-23 | End: 2024-07-23 | Stop reason: HOSPADM

## 2024-07-23 RX ORDER — FENTANYL CITRATE 50 UG/ML
25 INJECTION, SOLUTION INTRAMUSCULAR; INTRAVENOUS EVERY 5 MIN PRN
Status: DISCONTINUED | OUTPATIENT
Start: 2024-07-23 | End: 2024-07-23 | Stop reason: HOSPADM

## 2024-07-23 RX ORDER — SODIUM CHLORIDE 0.9 % (FLUSH) 0.9 %
5-40 SYRINGE (ML) INJECTION EVERY 12 HOURS SCHEDULED
Status: DISCONTINUED | OUTPATIENT
Start: 2024-07-23 | End: 2024-07-24 | Stop reason: HOSPADM

## 2024-07-23 RX ORDER — HYDROMORPHONE HYDROCHLORIDE 2 MG/ML
INJECTION, SOLUTION INTRAMUSCULAR; INTRAVENOUS; SUBCUTANEOUS PRN
Status: DISCONTINUED | OUTPATIENT
Start: 2024-07-23 | End: 2024-07-23 | Stop reason: SDUPTHER

## 2024-07-23 RX ORDER — EPHEDRINE SULFATE/0.9% NACL/PF 25 MG/5 ML
SYRINGE (ML) INTRAVENOUS PRN
Status: DISCONTINUED | OUTPATIENT
Start: 2024-07-23 | End: 2024-07-23 | Stop reason: SDUPTHER

## 2024-07-23 RX ORDER — HYDROCHLOROTHIAZIDE 25 MG/1
25 TABLET ORAL DAILY
Status: DISCONTINUED | OUTPATIENT
Start: 2024-07-24 | End: 2024-07-24 | Stop reason: HOSPADM

## 2024-07-23 RX ADMIN — Medication 80 MCG: at 12:17

## 2024-07-23 RX ADMIN — GLYCOPYRROLATE 0.4 MG: 0.2 INJECTION INTRAMUSCULAR; INTRAVENOUS at 13:34

## 2024-07-23 RX ADMIN — SODIUM CHLORIDE: 9 INJECTION, SOLUTION INTRAVENOUS at 17:27

## 2024-07-23 RX ADMIN — PROPOFOL 30 MG: 10 INJECTION, EMULSION INTRAVENOUS at 11:18

## 2024-07-23 RX ADMIN — HYDROMORPHONE HYDROCHLORIDE 0.2 MG: 2 INJECTION INTRAMUSCULAR; INTRAVENOUS; SUBCUTANEOUS at 11:57

## 2024-07-23 RX ADMIN — EPHEDRINE SULFATE 10 MG: 5 INJECTION INTRAVENOUS at 11:36

## 2024-07-23 RX ADMIN — ONDANSETRON HYDROCHLORIDE 4 MG: 2 INJECTION, SOLUTION INTRAMUSCULAR; INTRAVENOUS at 11:27

## 2024-07-23 RX ADMIN — PHENYLEPHRINE HYDROCHLORIDE 40 MCG/MIN: 10 INJECTION INTRAVENOUS at 11:37

## 2024-07-23 RX ADMIN — SODIUM CHLORIDE, POTASSIUM CHLORIDE, SODIUM LACTATE AND CALCIUM CHLORIDE: 600; 310; 30; 20 INJECTION, SOLUTION INTRAVENOUS at 12:41

## 2024-07-23 RX ADMIN — ROCURONIUM BROMIDE 30 MG: 10 INJECTION INTRAVENOUS at 11:17

## 2024-07-23 RX ADMIN — ACETAMINOPHEN 650 MG: 325 TABLET ORAL at 21:14

## 2024-07-23 RX ADMIN — Medication 40 MCG: at 11:17

## 2024-07-23 RX ADMIN — HYDROMORPHONE HYDROCHLORIDE 0.5 MG: 2 INJECTION INTRAMUSCULAR; INTRAVENOUS; SUBCUTANEOUS at 11:08

## 2024-07-23 RX ADMIN — WATER 2000 MG: 1 INJECTION INTRAMUSCULAR; INTRAVENOUS; SUBCUTANEOUS at 11:27

## 2024-07-23 RX ADMIN — Medication 80 MCG: at 11:37

## 2024-07-23 RX ADMIN — Medication 5 MG: at 13:34

## 2024-07-23 RX ADMIN — PROPOFOL 120 MG: 10 INJECTION, EMULSION INTRAVENOUS at 11:15

## 2024-07-23 RX ADMIN — ROCURONIUM BROMIDE 10 MG: 10 INJECTION INTRAVENOUS at 11:34

## 2024-07-23 RX ADMIN — ROCURONIUM BROMIDE 10 MG: 10 INJECTION INTRAVENOUS at 12:34

## 2024-07-23 RX ADMIN — EPHEDRINE SULFATE 10 MG: 5 INJECTION INTRAVENOUS at 12:18

## 2024-07-23 RX ADMIN — ACETAMINOPHEN 650 MG: 325 TABLET ORAL at 17:33

## 2024-07-23 RX ADMIN — LIDOCAINE HYDROCHLORIDE 80 MG: 20 INJECTION, SOLUTION EPIDURAL; INFILTRATION; INTRACAUDAL; PERINEURAL at 11:15

## 2024-07-23 RX ADMIN — ACETAMINOPHEN 1000 MG: 500 TABLET ORAL at 10:13

## 2024-07-23 RX ADMIN — SODIUM CHLORIDE, POTASSIUM CHLORIDE, SODIUM LACTATE AND CALCIUM CHLORIDE: 600; 310; 30; 20 INJECTION, SOLUTION INTRAVENOUS at 10:19

## 2024-07-23 RX ADMIN — DEXAMETHASONE SODIUM PHOSPHATE 8 MG: 4 INJECTION, SOLUTION INTRAMUSCULAR; INTRAVENOUS at 11:22

## 2024-07-23 NOTE — OP NOTE
the supine position, bilateral arms tucked to his sides, with appropriate padding. The patient's abdomen was prepped and draped sterilely, including use of Ioban.    After appropriate time-out was held, the skin was infused with local anesthetic, an incision was made on the left abdomen and an 8 mm optical entry trocar was placed intra-abdominally under visualization, and pneumoperitoneum was achieved. The abdomen was explored, with findings noted above. An 8 mm robotic port was placed on the right side and an 8 mm robotic port placed in the upper midline. The patient was placed in Trendelenburg position, the robot was docked and robotic instruments were inserted.    The abdomen was explored, with findings noted as above.  Minimal adhesions to the anterior-abdominal wall.  The peritoneum was incised, and a peritoneal flap was created on the Left. This was carried down towards Benito's ligament medially and the abdominal side wall laterally. Appropriate landmarks were identified.  The indirect hernia sac along the incarcerated sigmoid colon was carefully reduced. The cord structures were dissected free of the peritoneum, and the peritoneum was taken down beyond the splaying of the cord.   The large indirect hernia was plicated with a 3-0 barbed PDS suture.  Care was taken to avoid injury to the associated nerves.    Once dissection was completed, a 10 cm x 15 cm mesh was inserted and set into position. It was secured with a 3-0 PDS suture on Benito's ligament and an additional suture to the medial anterior abdominal wall. It was noted to be lying appropriately and in good position.  Hemostasis was ensured. A 3-0 barbed absorbable suture was used to reapproximate the peritoneum.    A 3-0 vicryl was used to close the peritoneal defect created at the hernia sac.     Next, I turned my attention to the contralateral side.  Repair was proceeded in a similar fashion with findings as above. The large indirect hernia was

## 2024-07-23 NOTE — INTERVAL H&P NOTE
Update History & Physical    The Patient's History and Physical attached was reviewed with the patient and I examined the patient.  There is no change.  The surgical site was confirmed by the patient and me.    Plan:  The risk, benefits, expected outcome, and alternative to the recommended procedure have been discussed with Alexandr Lewis.  He understands and wants to proceed with the procedure.     Sites examine and marked.

## 2024-07-23 NOTE — ANESTHESIA POSTPROCEDURE EVALUATION
Department of Anesthesiology  Postprocedure Note    Patient: Alexandr Lewis  MRN: [id number]  YOB: 1940  Date of evaluation: 7/23/2024    Procedure Summary       Date: 07/23/24 Room / Location: [facility] MAIN OR M1 / [facility] MAIN OR    Anesthesia Start: 1109 Anesthesia Stop: 1353    Procedure: ROBOT ASSISTED LAPAROSCOPIC BILATERAL INGUINAL HERNIA REPAIR WITH MESH AND ABDOMINAL WALL HERNIA REPAIR (Bilateral: Abdomen) Diagnosis:       Bilateral inguinal hernia      Abdominal wall hernia      (Bilateral inguinal hernia [K40.20])      (Abdominal wall hernia [K43.9])    Providers: Case Pryor MD Responsible Provider: Bassem Edgar MD    Anesthesia Type: General ASA Status: 2            Anesthesia Type: General    Cara Phase I:      Cara Phase II:      Anesthesia Post Evaluation    Patient location during evaluation: PACU  Patient participation: complete - patient participated  Level of consciousness: sleepy but conscious and responsive to verbal stimuli  Pain score: 2  Airway patency: patent  Nausea & Vomiting: no vomiting and no nausea  Cardiovascular status: blood pressure returned to baseline and hemodynamically stable  Respiratory status: acceptable  Hydration status: stable  Multimodal analgesia pain management approach  Pain management: adequate    No notable events documented.

## 2024-07-23 NOTE — ANESTHESIA PRE PROCEDURE
AM    MCV 83.4 07/17/2024 10:52 AM    RDW 14.6 07/17/2024 10:52 AM     07/17/2024 10:52 AM       CMP:   Lab Results   Component Value Date/Time     07/17/2024 10:52 AM    K 5.4 07/17/2024 10:52 AM     07/17/2024 10:52 AM    CO2 26 07/17/2024 10:52 AM    BUN 48 07/17/2024 10:52 AM    CREATININE 1.85 07/17/2024 10:52 AM    GFRAA 41 10/15/2021 12:47 AM    AGRATIO 0.8 10/12/2021 10:08 AM    LABGLOM 36 07/17/2024 10:52 AM    LABGLOM 34 01/18/2024 06:23 PM    GLUCOSE 99 07/17/2024 10:52 AM    CALCIUM 8.8 07/17/2024 10:52 AM    BILITOT 0.6 07/17/2024 10:52 AM    ALKPHOS 74 07/17/2024 10:52 AM    ALKPHOS 82 10/12/2021 10:08 AM    AST 81 07/17/2024 10:52 AM    ALT 90 07/17/2024 10:52 AM       POC Tests: No results for input(s): \"POCGLU\", \"POCNA\", \"POCK\", \"POCCL\", \"POCBUN\", \"POCHEMO\", \"POCHCT\" in the last 72 hours.    Coags:   Lab Results   Component Value Date/Time    PROTIME 11.9 07/17/2024 10:52 AM    INR 1.2 07/17/2024 10:52 AM    APTT 22.2 07/17/2024 10:52 AM       HCG (If Applicable): No results found for: \"PREGTESTUR\", \"PREGSERUM\", \"HCG\", \"HCGQUANT\"     ABGs: No results found for: \"PHART\", \"PO2ART\", \"ODY3WZO\", \"UIY7OOE\", \"BEART\", \"W4JMBIHY\"     Type & Screen (If Applicable):  No results found for: \"LABABO\"    Drug/Infectious Status (If Applicable):  No results found for: \"HIV\", \"HEPCAB\"    COVID-19 Screening (If Applicable):   Lab Results   Component Value Date/Time    COVID19 Not detected 12/03/2022 09:53 AM           Anesthesia Evaluation  Patient summary reviewed and Nursing notes reviewed  Airway: Mallampati: II       Mouth opening: > = 3 FB   Dental: normal exam         Pulmonary:Negative Pulmonary ROS and normal exam  breath sounds clear to auscultation                             Cardiovascular:Negative CV ROS  Exercise tolerance: good (>4 METS)  (+) hypertension: moderate        Rhythm: regular  Rate: normal                    Neuro/Psych:   Negative Neuro/Psych ROS

## 2024-07-23 NOTE — FLOWSHEET NOTE
07/23/24 1352   Handoff   Communication Given Periop Handoff/Relief   Handoff phase Phase I receiving   Handoff Given To Mallory EVANS   Handoff Received From Keyla EVANS, Miki COFFEY & Berhane CAMPO   Handoff Communication Face to Face         1512 Awaiting return call from floor, Alexandra (niece) received post op update via cell phone [phone]      TRANSFER - OUT REPORT:    Verbal report given to Rhett EVANS  on Alexandr Lewis  being transferred to [facility](unit) for routine post-op       Report consisted of patient’s Situation, Background, Assessment and   Recommendations(SBAR).     Information from the following report(s) Surgery Report, Intake/Output, MAR, and Cardiac Rhythm SB  was reviewed with the receiving nurse.    Opportunity for questions and clarification was provided.      Patient transported with:   Monitor  Tech    Lines:     This SmartLink retrieves the last documented value for LDA assessment data, retrieved by either LDA type or by LDA group ID.     This SmartLink should be used in a SmartText or SmartPhrase. If one is not available, please contact your .

## 2024-07-24 VITALS
WEIGHT: 149.47 LBS | HEIGHT: 66 IN | TEMPERATURE: 98.4 F | OXYGEN SATURATION: 100 % | SYSTOLIC BLOOD PRESSURE: 116 MMHG | BODY MASS INDEX: 24.02 KG/M2 | RESPIRATION RATE: 16 BRPM | HEART RATE: 62 BPM | DIASTOLIC BLOOD PRESSURE: 66 MMHG

## 2024-07-24 PROCEDURE — 6370000000 HC RX 637 (ALT 250 FOR IP): Performed by: SURGERY

## 2024-07-24 PROCEDURE — 96372 THER/PROPH/DIAG INJ SC/IM: CPT

## 2024-07-24 PROCEDURE — 2580000003 HC RX 258: Performed by: SURGERY

## 2024-07-24 PROCEDURE — G0378 HOSPITAL OBSERVATION PER HR: HCPCS

## 2024-07-24 PROCEDURE — 6360000002 HC RX W HCPCS: Performed by: SURGERY

## 2024-07-24 PROCEDURE — 99024 POSTOP FOLLOW-UP VISIT: CPT | Performed by: NURSE PRACTITIONER

## 2024-07-24 RX ORDER — IBUPROFEN 600 MG/1
600 TABLET ORAL 3 TIMES DAILY PRN
Qty: 30 TABLET | Refills: 0 | Status: ON HOLD | OUTPATIENT
Start: 2024-07-24

## 2024-07-24 RX ORDER — POLYETHYLENE GLYCOL 3350 17 G/17G
17 POWDER, FOR SOLUTION ORAL DAILY
Qty: 256 G | Refills: 0 | Status: ON HOLD | OUTPATIENT
Start: 2024-07-24 | End: 2024-08-08

## 2024-07-24 RX ORDER — ONDANSETRON 4 MG/1
4 TABLET, ORALLY DISINTEGRATING ORAL 3 TIMES DAILY PRN
Qty: 10 TABLET | Refills: 0 | Status: ON HOLD | OUTPATIENT
Start: 2024-07-24

## 2024-07-24 RX ORDER — OXYCODONE HYDROCHLORIDE 5 MG/1
5 TABLET ORAL EVERY 6 HOURS PRN
Qty: 15 TABLET | Refills: 0 | Status: SHIPPED | OUTPATIENT
Start: 2024-07-24 | End: 2024-07-27

## 2024-07-24 RX ORDER — ACETAMINOPHEN 325 MG/1
650 TABLET ORAL EVERY 6 HOURS
Qty: 56 TABLET | Refills: 0 | Status: ON HOLD | OUTPATIENT
Start: 2024-07-24 | End: 2024-07-31

## 2024-07-24 RX ADMIN — ENOXAPARIN SODIUM 30 MG: 100 INJECTION SUBCUTANEOUS at 10:31

## 2024-07-24 RX ADMIN — ACETAMINOPHEN 650 MG: 325 TABLET ORAL at 10:32

## 2024-07-24 RX ADMIN — ACETAMINOPHEN 650 MG: 325 TABLET ORAL at 02:46

## 2024-07-24 RX ADMIN — SODIUM CHLORIDE: 9 INJECTION, SOLUTION INTRAVENOUS at 02:52

## 2024-07-24 RX ADMIN — HYDROCHLOROTHIAZIDE 25 MG: 25 TABLET ORAL at 10:32

## 2024-07-24 RX ADMIN — LISINOPRIL 20 MG: 20 TABLET ORAL at 10:32

## 2024-07-24 RX ADMIN — SODIUM CHLORIDE, PRESERVATIVE FREE 10 ML: 5 INJECTION INTRAVENOUS at 10:33

## 2024-07-24 NOTE — PROGRESS NOTES
End of Shift Note    Bedside shift change report given to Tammy (oncoming nurse) by Iva Crouch RN (offgoing nurse).  Report included the following information SBAR, Kardex, MAR, Recent Results, and Med Rec Status    Shift worked:  7p-7a     Shift summary and any significant changes:     uyneventful     Concerns for physician to address:  C/o gas, simethicone ?     Zone phone for oncoming shift:   [phone]              Length of Stay:  Expected LOS: 1  Actual LOS: 0      Iva Crouch RN

## 2024-07-24 NOTE — CARE COORDINATION
Care Management Initial Assessment       RUR:N/A  Readmission? No  1st IM letter given? No  1st  letter given: No    CM met with patient at bedside. Patient lives with his son in a 1 level home with 3 stairs to enter. The patient has access to a cane and walker and reports providing all of his ADLs independently. He denies HH/IPR/SNF and says he drives sometimes. He confirmed his PCP and uses the [facility] in [place]. He refused ACP doc conversation deferring to his son as next of kin.  Either his son or Alexandra Haney (niece) [phone] will transport at discharge. CM will continue to follow.     07/24/24 8891   Service Assessment   Patient Orientation Alert and Oriented   Cognition Alert   History Provided By Patient   Support Systems Children  (lives with his son)   Patient's Healthcare Decision Maker is: Legal Next of Kin   PCP Verified by CM Yes   Last Visit to PCP Within last 3 months   Prior Functional Level Independent in ADLs/IADLs   Current Functional Level Independent in ADLs/IADLs   Can patient return to prior living arrangement Yes   Ability to make needs known: Good   Family able to assist with home care needs: Yes   Financial Resources Medicare   Community Resources None   CM/SW Referral ADLs/IADLs   Social/Functional History   Lives With Son   Home Layout One level   Home Access Stairs to enter with rails   Entrance Stairs - Number of Steps 3   Bathroom Toilet Standard   Bathroom Equipment None   Home Equipment Cane;Walker - Standard   Receives Help From Family   ADL Assistance Independent   Homemaking Assistance Independent   Ambulation Assistance Independent   Transfer Assistance Independent   Active  Yes  (niece Alexandra Haney [phone] or son to transport)   Mode of Transportation Car   Occupation Retired   Discharge Planning   Type of Residence House   Living Arrangements Children   Current Services Prior To Admission None   Potential Assistance Needed Home Care   DME Ordered?

## 2024-07-24 NOTE — PROGRESS NOTES
DISCHARGE NOTE FROM SURGICAL-TELEMETRY NURSE    Patient determined to be stable for discharge by attending provider. I have reviewed the discharge instructions and follow-up appointments with the Patient. They verbalized understanding and all questions were answered to their satisfaction. No complaints or further questions were expressed.      Medications sent to pharmacy Appropriate educational materials and medication side effect teaching were provided.      PIV were removed prior to discharge.     Patient did not discharge with any line, valdovinos, or drain.    All personal items collected during admission were returned to the patient prior to discharge.    Post-op patient: Yes - Patient given post-op discharge kit and instructed on use.      Tammy Verde RN

## 2024-07-24 NOTE — DISCHARGE INSTRUCTIONS
Dr. Pryor's Discharge Instructions for:  Alexandr Lewis    MRN: [id number] : 1940    Admitted: 2024  Discharged: 2024       What to do at Home    Recommended diet: Resume your usual diet    Recommended activity: Lift less than 10 lbs for 4 weeks.    Follow-up with Dr. Pryor in 2-3 weeks.  Call [phone] for an appointment.      Inguinal Hernia Repair: What to Expect at Home     Your Recovery    You are likely to have pain for the next few days. You may also feel like you have the flu, and you may have a low fever and feel tired and nauseated. This is common.    You should feel better after a few days and will probably feel much better in 7 days. For several weeks you may feel twinges or pulling in the hernia repair when you move. You may have some bruising on the scrotum and along the penis. This is normal.    Men will need to wear a jockstrap or briefs, not boxers, for scrotal support for several days after a groin (inguinal) hernia repair. GutCheck bicycle shorts may provide good support.    This care sheet gives you a general idea about how long it will take for you to recover. But each person recovers at a different pace. Follow the steps below to get better as quickly as possible.    How can you care for yourself at home?    Activity  Rest when you feel tired. Getting enough sleep will help you recover. You can try to sleep with your head up in a recliner chair if that is more comfortable.   Try to walk each day. Start by walking a little more than you did the day before. Bit by bit, increase the amount you walk. Walking boosts blood flow and helps prevent pneumonia and constipation.  Put ice or a cold pack on the area of your hernia repair for 10 to 15 minutes at a time. Try to do this every 1 to 2 hours for the first 24 hours (when you are awake) or until the swelling goes down. Put a thin cloth between the ice and your skin.  Avoid strenuous activities, such as biking, jogging, weight

## 2024-07-24 NOTE — DISCHARGE SUMMARY
Post- Surgical Discharge Summary    Patient ID:  Alexandr Lewis  [id number]  male  84 y.o.  1940    Admit date: 7/23/2024    Discharge date: 07/24/24     Admitting Physician: Case Pryor MD     Date of Surgery:   7/23/2024     Preoperative Diagnosis:  Bilateral inguinal hernia [K40.20]  Abdominal wall hernia [K43.9]    Postoperative Diagnosis:   * No post-op diagnosis entered *    Procedure(s):  ROBOT ASSISTED LAPAROSCOPIC BILATERAL INGUINAL HERNIA REPAIR WITH MESH AND ABDOMINAL WALL HERNIA REPAIR     Anesthesia Type:   General     Surgeon: Case Pryor MD                            HPI:  Pt is a 84 y.o. male who has a history of Bilateral inguinal hernia [K40.20]  Abdominal wall hernia [K43.9] who presents at this time for a multiple hernia repairs.    Problem List:   Patient Active Problem List   Diagnosis    SBO (small bowel obstruction) (HCC)    Mesenteric ischemia (HCC)    Bilateral inguinal hernia    Abdominal wall hernia        Hospital Course:  The patient underwent surgery. Intra-operative complications: None; patient tolerated the procedure well. Was taken to the PACU in stable condition and then transferred to the surgical floor.      Perioperative Antibiotics: Cefazolin    Postoperative Pain Management:  Oxycodone     Postoperative transfusions:    Number of units banked PRBCs =   none     Post Op complications: None     Incisions  - clean, dry and intact. No significant erythema or swelling. Wound(s) appear to be healing without any evidence of infection.      Patient mobilized with nursing and was found to be safe and steady with ambulation.     Discharged to: Home     Condition on Discharge: Stable     Discharge instructions:    - Take pain medications as prescribed  - Diet regular  - Discharge activity:    - Activity as tolerated    - Ambulate several times a day   - No heavy lifting for 4 weeks   - Do not drive for two weeks or while on opioid pain medications  - Wound Care: Keep wound(s)

## 2024-07-24 NOTE — PLAN OF CARE
Problem: Safety - Adult  Goal: Free from fall injury  7/24/2024 0830 by Tammy Verde RN  Outcome: Progressing  7/23/2024 2241 by Iva Crouch RN  Outcome: Progressing  Flowsheets (Taken 7/23/2024 2000)  Free From Fall Injury: Instruct family/caregiver on patient safety     Problem: ABCDS Injury Assessment  Goal: Absence of physical injury  7/24/2024 0830 by Tammy Verde RN  Outcome: Progressing  7/23/2024 2241 by Iva Crouch RN  Outcome: Progressing     Problem: Pain  Goal: Verbalizes/displays adequate comfort level or baseline comfort level  7/24/2024 0830 by Tammy Verde RN  Outcome: Progressing  7/23/2024 2241 by Iva Crouch RN  Outcome: Progressing     Problem: Discharge Planning  Goal: Discharge to home or other facility with appropriate resources  7/24/2024 0830 by Tammy Verde RN  Outcome: Progressing  Flowsheets (Taken 7/24/2024 0829)  Discharge to home or other facility with appropriate resources:   Identify barriers to discharge with patient and caregiver   Arrange for needed discharge resources and transportation as appropriate   Identify discharge learning needs (meds, wound care, etc)  7/23/2024 2241 by Iva Crouch RN  Outcome: Progressing  Flowsheets (Taken 7/23/2024 1837 by Tammy Verde RN)  Discharge to home or other facility with appropriate resources:   Identify barriers to discharge with patient and caregiver   Arrange for needed discharge resources and transportation as appropriate   Identify discharge learning needs (meds, wound care, etc)

## 2024-07-27 ENCOUNTER — APPOINTMENT (OUTPATIENT)
Facility: HOSPITAL | Age: 84
End: 2024-07-27
Payer: MEDICARE

## 2024-07-27 ENCOUNTER — HOSPITAL ENCOUNTER (INPATIENT)
Facility: HOSPITAL | Age: 84
LOS: 3 days | Discharge: HOME | End: 2024-07-31
Admitting: STUDENT IN AN ORGANIZED HEALTH CARE EDUCATION/TRAINING PROGRAM
Payer: MEDICARE

## 2024-07-27 DIAGNOSIS — I21.4 NSTEMI (NON-ST ELEVATED MYOCARDIAL INFARCTION) (HCC): Primary | ICD-10-CM

## 2024-07-27 DIAGNOSIS — K40.20 BILATERAL INGUINAL HERNIA WITHOUT OBSTRUCTION OR GANGRENE, RECURRENCE NOT SPECIFIED: ICD-10-CM

## 2024-07-27 DIAGNOSIS — I24.9 ACUTE CORONARY SYNDROME (HCC): ICD-10-CM

## 2024-07-27 DIAGNOSIS — I20.0 UNSTABLE ANGINA PECTORIS (HCC): ICD-10-CM

## 2024-07-27 LAB
ALBUMIN SERPL-MCNC: 2.4 G/DL (ref 3.5–5)
ALBUMIN/GLOB SERPL: 0.8 (ref 1.1–2.2)
ALP SERPL-CCNC: 66 U/L (ref 45–117)
ALT SERPL-CCNC: 32 U/L (ref 12–78)
ANION GAP SERPL CALC-SCNC: 7 MMOL/L (ref 5–15)
AST SERPL-CCNC: 31 U/L (ref 15–37)
BASOPHILS # BLD: 0 K/UL (ref 0–0.1)
BASOPHILS NFR BLD: 0 % (ref 0–1)
BILIRUB SERPL-MCNC: 1.1 MG/DL (ref 0.2–1)
BUN SERPL-MCNC: 38 MG/DL (ref 6–20)
BUN/CREAT SERPL: 23 (ref 12–20)
CALCIUM SERPL-MCNC: 8.1 MG/DL (ref 8.5–10.1)
CHLORIDE SERPL-SCNC: 110 MMOL/L (ref 97–108)
CO2 SERPL-SCNC: 23 MMOL/L (ref 21–32)
CREAT SERPL-MCNC: 1.66 MG/DL (ref 0.7–1.3)
DIFFERENTIAL METHOD BLD: ABNORMAL
EOSINOPHIL # BLD: 0.1 K/UL (ref 0–0.4)
EOSINOPHIL NFR BLD: 2 % (ref 0–7)
ERYTHROCYTE [DISTWIDTH] IN BLOOD BY AUTOMATED COUNT: 15.6 % (ref 11.5–14.5)
GLOBULIN SER CALC-MCNC: 3.2 G/DL (ref 2–4)
GLUCOSE SERPL-MCNC: 159 MG/DL (ref 65–100)
HCT VFR BLD AUTO: 26.2 % (ref 36.6–50.3)
HGB BLD-MCNC: 8.4 G/DL (ref 12.1–17)
IMM GRANULOCYTES # BLD AUTO: 0 K/UL (ref 0–0.04)
IMM GRANULOCYTES NFR BLD AUTO: 0 % (ref 0–0.5)
LIPASE SERPL-CCNC: 89 U/L (ref 13–75)
LYMPHOCYTES # BLD: 1.4 K/UL (ref 0.8–3.5)
LYMPHOCYTES NFR BLD: 17 % (ref 12–49)
MAGNESIUM SERPL-MCNC: 1.9 MG/DL (ref 1.6–2.4)
MCH RBC QN AUTO: 26.8 PG (ref 26–34)
MCHC RBC AUTO-ENTMCNC: 32.1 G/DL (ref 30–36.5)
MCV RBC AUTO: 83.7 FL (ref 80–99)
MONOCYTES # BLD: 1.1 K/UL (ref 0–1)
MONOCYTES NFR BLD: 13 % (ref 5–13)
NEUTS SEG # BLD: 5.5 K/UL (ref 1.8–8)
NEUTS SEG NFR BLD: 68 % (ref 32–75)
NRBC # BLD: 0 K/UL (ref 0–0.01)
NRBC BLD-RTO: 0 PER 100 WBC
PLATELET # BLD AUTO: 198 K/UL (ref 150–400)
PMV BLD AUTO: 10.4 FL (ref 8.9–12.9)
POTASSIUM SERPL-SCNC: 4 MMOL/L (ref 3.5–5.1)
PROT SERPL-MCNC: 5.6 G/DL (ref 6.4–8.2)
RBC # BLD AUTO: 3.13 M/UL (ref 4.1–5.7)
SODIUM SERPL-SCNC: 140 MMOL/L (ref 136–145)
TROPONIN I SERPL HS-MCNC: 32 NG/L (ref 0–76)
WBC # BLD AUTO: 8.1 K/UL (ref 4.1–11.1)

## 2024-07-27 PROCEDURE — 80053 COMPREHEN METABOLIC PANEL: CPT

## 2024-07-27 PROCEDURE — 83690 ASSAY OF LIPASE: CPT

## 2024-07-27 PROCEDURE — 71045 X-RAY EXAM CHEST 1 VIEW: CPT

## 2024-07-27 PROCEDURE — 83735 ASSAY OF MAGNESIUM: CPT

## 2024-07-27 PROCEDURE — 93005 ELECTROCARDIOGRAM TRACING: CPT

## 2024-07-27 PROCEDURE — 85025 COMPLETE CBC W/AUTO DIFF WBC: CPT

## 2024-07-27 PROCEDURE — 36415 COLL VENOUS BLD VENIPUNCTURE: CPT

## 2024-07-27 PROCEDURE — 99285 EMERGENCY DEPT VISIT HI MDM: CPT

## 2024-07-27 PROCEDURE — 84484 ASSAY OF TROPONIN QUANT: CPT

## 2024-07-27 ASSESSMENT — PAIN DESCRIPTION - LOCATION
LOCATION: CHEST
LOCATION: CHEST

## 2024-07-27 ASSESSMENT — PAIN DESCRIPTION - ORIENTATION: ORIENTATION: LEFT;MID

## 2024-07-27 ASSESSMENT — PAIN SCALES - GENERAL
PAINLEVEL_OUTOF10: 0
PAINLEVEL_OUTOF10: 4

## 2024-07-27 ASSESSMENT — PAIN - FUNCTIONAL ASSESSMENT: PAIN_FUNCTIONAL_ASSESSMENT: 0-10

## 2024-07-28 PROBLEM — R07.9 CHEST PAIN: Status: ACTIVE | Noted: 2024-07-28

## 2024-07-28 LAB
ALBUMIN SERPL-MCNC: 2.3 G/DL (ref 3.5–5)
ALBUMIN/GLOB SERPL: 0.7 (ref 1.1–2.2)
ALP SERPL-CCNC: 59 U/L (ref 45–117)
ALT SERPL-CCNC: 30 U/L (ref 12–78)
ANION GAP SERPL CALC-SCNC: 6 MMOL/L (ref 5–15)
APTT PPP: 20.5 SEC (ref 22.1–31)
AST SERPL-CCNC: 23 U/L (ref 15–37)
BASOPHILS # BLD: 0 K/UL (ref 0–0.1)
BASOPHILS NFR BLD: 0 % (ref 0–1)
BILIRUB SERPL-MCNC: 1 MG/DL (ref 0.2–1)
BUN SERPL-MCNC: 35 MG/DL (ref 6–20)
BUN/CREAT SERPL: 24 (ref 12–20)
CALCIUM SERPL-MCNC: 8.2 MG/DL (ref 8.5–10.1)
CHLORIDE SERPL-SCNC: 112 MMOL/L (ref 97–108)
CO2 SERPL-SCNC: 23 MMOL/L (ref 21–32)
CREAT SERPL-MCNC: 1.47 MG/DL (ref 0.7–1.3)
DIFFERENTIAL METHOD BLD: ABNORMAL
EOSINOPHIL # BLD: 0.2 K/UL (ref 0–0.4)
EOSINOPHIL NFR BLD: 3 % (ref 0–7)
ERYTHROCYTE [DISTWIDTH] IN BLOOD BY AUTOMATED COUNT: 15.2 % (ref 11.5–14.5)
GLOBULIN SER CALC-MCNC: 3.1 G/DL (ref 2–4)
GLUCOSE SERPL-MCNC: 94 MG/DL (ref 65–100)
HCT VFR BLD AUTO: 24.7 % (ref 36.6–50.3)
HGB BLD-MCNC: 8.3 G/DL (ref 12.1–17)
IMM GRANULOCYTES # BLD AUTO: 0 K/UL (ref 0–0.04)
IMM GRANULOCYTES NFR BLD AUTO: 1 % (ref 0–0.5)
INR PPP: 1.1 (ref 0.9–1.1)
LYMPHOCYTES # BLD: 1.5 K/UL (ref 0.8–3.5)
LYMPHOCYTES NFR BLD: 18 % (ref 12–49)
MCH RBC QN AUTO: 27.6 PG (ref 26–34)
MCHC RBC AUTO-ENTMCNC: 33.6 G/DL (ref 30–36.5)
MCV RBC AUTO: 82.1 FL (ref 80–99)
MONOCYTES # BLD: 1 K/UL (ref 0–1)
MONOCYTES NFR BLD: 12 % (ref 5–13)
NEUTS SEG # BLD: 5.3 K/UL (ref 1.8–8)
NEUTS SEG NFR BLD: 66 % (ref 32–75)
NRBC # BLD: 0 K/UL (ref 0–0.01)
NRBC BLD-RTO: 0 PER 100 WBC
PLATELET # BLD AUTO: 176 K/UL (ref 150–400)
PMV BLD AUTO: 10.4 FL (ref 8.9–12.9)
POTASSIUM SERPL-SCNC: 3.7 MMOL/L (ref 3.5–5.1)
PROT SERPL-MCNC: 5.4 G/DL (ref 6.4–8.2)
PROTHROMBIN TIME: 11.3 SEC (ref 9–11.1)
RBC # BLD AUTO: 3.01 M/UL (ref 4.1–5.7)
SODIUM SERPL-SCNC: 141 MMOL/L (ref 136–145)
THERAPEUTIC RANGE: ABNORMAL SECS (ref 58–77)
TROPONIN I SERPL HS-MCNC: 38 NG/L (ref 0–76)
TROPONIN I SERPL HS-MCNC: 56 NG/L (ref 0–76)
TROPONIN I SERPL HS-MCNC: 60 NG/L (ref 0–76)
TROPONIN I SERPL HS-MCNC: 93 NG/L (ref 0–76)
UFH PPP CHRO-ACNC: 0.5 IU/ML
UFH PPP CHRO-ACNC: 0.51 IU/ML
UFH PPP CHRO-ACNC: <0.1 IU/ML
WBC # BLD AUTO: 8.1 K/UL (ref 4.1–11.1)

## 2024-07-28 PROCEDURE — 84484 ASSAY OF TROPONIN QUANT: CPT

## 2024-07-28 PROCEDURE — 85610 PROTHROMBIN TIME: CPT

## 2024-07-28 PROCEDURE — 85520 HEPARIN ASSAY: CPT

## 2024-07-28 PROCEDURE — 85025 COMPLETE CBC W/AUTO DIFF WBC: CPT

## 2024-07-28 PROCEDURE — 2580000003 HC RX 258: Performed by: STUDENT IN AN ORGANIZED HEALTH CARE EDUCATION/TRAINING PROGRAM

## 2024-07-28 PROCEDURE — 6360000002 HC RX W HCPCS: Performed by: INTERNAL MEDICINE

## 2024-07-28 PROCEDURE — 36415 COLL VENOUS BLD VENIPUNCTURE: CPT

## 2024-07-28 PROCEDURE — 6370000000 HC RX 637 (ALT 250 FOR IP): Performed by: STUDENT IN AN ORGANIZED HEALTH CARE EDUCATION/TRAINING PROGRAM

## 2024-07-28 PROCEDURE — 6360000002 HC RX W HCPCS: Performed by: STUDENT IN AN ORGANIZED HEALTH CARE EDUCATION/TRAINING PROGRAM

## 2024-07-28 PROCEDURE — 1100000003 HC PRIVATE W/ TELEMETRY

## 2024-07-28 PROCEDURE — 80053 COMPREHEN METABOLIC PANEL: CPT

## 2024-07-28 PROCEDURE — 85730 THROMBOPLASTIN TIME PARTIAL: CPT

## 2024-07-28 PROCEDURE — 2580000003 HC RX 258: Performed by: INTERNAL MEDICINE

## 2024-07-28 RX ORDER — LISINOPRIL AND HYDROCHLOROTHIAZIDE 25; 20 MG/1; MG/1
1 TABLET ORAL DAILY
Status: DISCONTINUED | OUTPATIENT
Start: 2024-07-28 | End: 2024-07-28

## 2024-07-28 RX ORDER — LISINOPRIL 20 MG/1
20 TABLET ORAL DAILY
Status: DISCONTINUED | OUTPATIENT
Start: 2024-07-28 | End: 2024-07-31 | Stop reason: HOSPADM

## 2024-07-28 RX ORDER — SODIUM CHLORIDE 0.9 % (FLUSH) 0.9 %
5-40 SYRINGE (ML) INJECTION EVERY 12 HOURS SCHEDULED
Status: DISCONTINUED | OUTPATIENT
Start: 2024-07-28 | End: 2024-07-31 | Stop reason: HOSPADM

## 2024-07-28 RX ORDER — FERROUS SULFATE 325(65) MG
325 TABLET ORAL
Status: DISCONTINUED | OUTPATIENT
Start: 2024-07-28 | End: 2024-07-31 | Stop reason: HOSPADM

## 2024-07-28 RX ORDER — HEPARIN SODIUM 1000 [USP'U]/ML
4000 INJECTION, SOLUTION INTRAVENOUS; SUBCUTANEOUS ONCE
Status: COMPLETED | OUTPATIENT
Start: 2024-07-28 | End: 2024-07-28

## 2024-07-28 RX ORDER — HEPARIN SODIUM 1000 [USP'U]/ML
4000 INJECTION, SOLUTION INTRAVENOUS; SUBCUTANEOUS PRN
Status: DISCONTINUED | OUTPATIENT
Start: 2024-07-28 | End: 2024-07-29

## 2024-07-28 RX ORDER — ACETAMINOPHEN 325 MG/1
650 TABLET ORAL EVERY 6 HOURS PRN
Status: DISCONTINUED | OUTPATIENT
Start: 2024-07-28 | End: 2024-07-31 | Stop reason: HOSPADM

## 2024-07-28 RX ORDER — HYDROCHLOROTHIAZIDE 25 MG/1
25 TABLET ORAL DAILY
Status: DISCONTINUED | OUTPATIENT
Start: 2024-07-28 | End: 2024-07-31 | Stop reason: HOSPADM

## 2024-07-28 RX ORDER — NITROGLYCERIN 0.4 MG/1
0.4 TABLET SUBLINGUAL EVERY 5 MIN PRN
Status: DISCONTINUED | OUTPATIENT
Start: 2024-07-28 | End: 2024-07-31 | Stop reason: HOSPADM

## 2024-07-28 RX ORDER — ASPIRIN 81 MG/1
81 TABLET, CHEWABLE ORAL DAILY
Status: DISCONTINUED | OUTPATIENT
Start: 2024-07-28 | End: 2024-07-31 | Stop reason: HOSPADM

## 2024-07-28 RX ORDER — VITAMIN B COMPLEX
1000 TABLET ORAL DAILY
Status: DISCONTINUED | OUTPATIENT
Start: 2024-07-28 | End: 2024-07-31 | Stop reason: HOSPADM

## 2024-07-28 RX ORDER — SODIUM CHLORIDE 9 MG/ML
INJECTION, SOLUTION INTRAVENOUS PRN
Status: DISCONTINUED | OUTPATIENT
Start: 2024-07-28 | End: 2024-07-31 | Stop reason: HOSPADM

## 2024-07-28 RX ORDER — SODIUM CHLORIDE 0.9 % (FLUSH) 0.9 %
5-40 SYRINGE (ML) INJECTION PRN
Status: DISCONTINUED | OUTPATIENT
Start: 2024-07-28 | End: 2024-07-31 | Stop reason: HOSPADM

## 2024-07-28 RX ORDER — HEPARIN SODIUM 10000 [USP'U]/100ML
12-30 INJECTION, SOLUTION INTRAVENOUS CONTINUOUS
Status: DISCONTINUED | OUTPATIENT
Start: 2024-07-28 | End: 2024-07-29

## 2024-07-28 RX ORDER — ACETAMINOPHEN 650 MG/1
650 SUPPOSITORY RECTAL EVERY 6 HOURS PRN
Status: DISCONTINUED | OUTPATIENT
Start: 2024-07-28 | End: 2024-07-31 | Stop reason: HOSPADM

## 2024-07-28 RX ORDER — HEPARIN SODIUM 1000 [USP'U]/ML
30 INJECTION, SOLUTION INTRAVENOUS; SUBCUTANEOUS PRN
Status: DISCONTINUED | OUTPATIENT
Start: 2024-07-28 | End: 2024-07-29

## 2024-07-28 RX ORDER — ATORVASTATIN CALCIUM 40 MG/1
80 TABLET, FILM COATED ORAL DAILY
Status: DISCONTINUED | OUTPATIENT
Start: 2024-07-28 | End: 2024-07-31 | Stop reason: HOSPADM

## 2024-07-28 RX ADMIN — CEFTRIAXONE SODIUM 1000 MG: 1 INJECTION, POWDER, FOR SOLUTION INTRAMUSCULAR; INTRAVENOUS at 13:07

## 2024-07-28 RX ADMIN — HEPARIN SODIUM 4000 UNITS: 1000 INJECTION INTRAVENOUS; SUBCUTANEOUS at 03:36

## 2024-07-28 RX ADMIN — Medication 1000 UNITS: at 08:05

## 2024-07-28 RX ADMIN — SODIUM CHLORIDE, PRESERVATIVE FREE 10 ML: 5 INJECTION INTRAVENOUS at 20:27

## 2024-07-28 RX ADMIN — ASPIRIN 81 MG: 81 TABLET, CHEWABLE ORAL at 08:05

## 2024-07-28 RX ADMIN — FERROUS SULFATE TAB 325 MG (65 MG ELEMENTAL FE) 325 MG: 325 (65 FE) TAB at 08:05

## 2024-07-28 RX ADMIN — AZITHROMYCIN MONOHYDRATE 500 MG: 500 INJECTION, POWDER, LYOPHILIZED, FOR SOLUTION INTRAVENOUS at 14:55

## 2024-07-28 RX ADMIN — ATORVASTATIN CALCIUM 80 MG: 40 TABLET, FILM COATED ORAL at 08:05

## 2024-07-28 RX ADMIN — HYDROCHLOROTHIAZIDE 25 MG: 25 TABLET ORAL at 08:05

## 2024-07-28 RX ADMIN — HEPARIN SODIUM AND DEXTROSE 12 UNITS/KG/HR: 10000; 5 INJECTION INTRAVENOUS at 03:47

## 2024-07-28 RX ADMIN — SODIUM CHLORIDE, PRESERVATIVE FREE 10 ML: 5 INJECTION INTRAVENOUS at 13:08

## 2024-07-28 RX ADMIN — LISINOPRIL 20 MG: 20 TABLET ORAL at 08:05

## 2024-07-28 ASSESSMENT — PAIN SCALES - GENERAL: PAINLEVEL_OUTOF10: 0

## 2024-07-28 NOTE — PROGRESS NOTES
Overnight admission.  Briefly patient is here with chest pain with uptrending troponin likely NSTEMI, CKD, HLD.  Patient is on heparin drip.  Continue with aspirin, statin.  Waiting for echocardiogram, cardiology consult.  Chest x-ray showed multiple opacities and will start on Rocephin and azithromycin to cover for possible pneumonia.  Will also repeat chest x-ray in the morning for interval change.  Patient to remain n.p.o. until seen by cardiology.  Will continue to follow-up with the patient.

## 2024-07-28 NOTE — H&P
Hospitalist Admission Note    NAME:Alexandr Lewis   : 1940   MRN: [id number]     Date/Time: 2024 3:16 AM    Patient PCP: Valentina Hanson APRN - NP    *Please be aware this note is formulated with assistance from voice-recognition dictation software. Please excuse any errors that may be present*    ______________________________________________________________________  Given the patient's current clinical presentation, I have a high level of concern for decompensation if discharged from the emergency department.  Complex decision making was performed, which includes reviewing the patient's available past medical records, laboratory results, and x-ray films.       My assessment of this patient's clinical condition and my plan of care is as follows.    Problem List:  Patient Active Problem List   Diagnosis    SBO (small bowel obstruction) (HCC)    Mesenteric ischemia (HCC)    Bilateral inguinal hernia    Abdominal wall hernia    Chest pain         Assessment / Plan:    Chest pain  Uptrending troponin  - Chest pain started earlier this evening - central chest pressure, no associated symptoms  - Troponin 32, 1 hour recheck increasing to 60  - Cardiology was consulted from the ED, recommended starting heparin gtt  - Will continue heparin gtt  - Continue aspirin   - Increase home statin to 80mg qhs   - Monitor HR overnight, consider BB   - Check TTE   - Cardiology consult ordered, appreciate assistance     CKD  - Cr 1.66 from baseline 1.77 - 1.9  - Hold home lisinopril-HCTZ for now   - Repeat BMP in AM     HLD  - Increase home atorvastatin to 80qhs       Medical Decision Making:   I personally reviewed labs: CBC, CMP, troponin   I personally reviewed imaging: CXR  I personally reviewed EKG: Yes  Toxic drug monitoring: Yes  Discussed case with: ED provider. After discussion I am in agreement that acuity of patient's medical condition necessitates hospital stay.      Code Status: FULL CODE  DVT Prophylaxis:  heparin gtt   Consults: Cardiology     Subjective:   CHIEF COMPLAINT:  Chest pain     HISTORY OF PRESENT ILLNESS:     Alexandr Lewis is a 84 y.o.  male with PMHx significant for listed PMH below who presents with the above chief complaint.   We were asked to admit for work up and evaluation of the above problems.     Patient presents with chief complaint of chest pain.  States that he was at home earlier this evening when he began to have centralized chest pain with some radiation into his neck and into his head.  States he has never had pain like this previously.  Denies any associated shortness of breath, nausea, vomiting, lightheadedness, palpitations.  Does note that he was recently hospitalized for hernia repair and states that this was generally uncomplicated.    Past Medical History:   Diagnosis Date    Arthritis     Cancer (HCC)     PROSTATE    Hx SBO     required surgery for lysis of adhesions    Hypertension     Inguinal hernia bilateral, non-recurrent     Kidney disease stage 3     sees Dr Enrrique Polk    Other ill-defined conditions(799.89)     elevated cholesterol    Ventral hernia         Past Surgical History:   Procedure Laterality Date    ABDOMINAL EXPLORATION SURGERY      lysis of adhesions from prostatectomy    COLONOSCOPY,DIAGNOSTIC  2012         INGUINAL HERNIA REPAIR Bilateral 2024    1. Laparoscopic repair of bilateral inguinal hernia with mesh, robot assisted 2. Incarcerated anterior abdominal hernia repair 4 CM performed by [facility]or [facility]    ORTHOPEDIC SURGERY      L SHOULDER-UNKNOWN PROCEDURE    PROSTATE BIOPSY, NEEDLE, SATURATION SAMPLING      PROSTATECTOMY         Social History     Tobacco Use    Smoking status: Former     Types: Cigarettes     Start date:      Quit date:      Years since quittin.6    Smokeless tobacco: Former     Types: Chew     Quit date:    Substance Use Topics    Alcohol use: Not Currently     0.00 0.00 - 0.01 K/uL    Neutrophils % 68 32 - 75 %    Lymphocytes % 17 12 - 49 %    Monocytes % 13 5 - 13 %    Eosinophils % 2 0 - 7 %    Basophils % 0 0 - 1 %    Immature Granulocytes % 0 0.0 - 0.5 %    Neutrophils Absolute 5.5 1.8 - 8.0 K/UL    Lymphocytes Absolute 1.4 0.8 - 3.5 K/UL    Monocytes Absolute 1.1 (H) 0.0 - 1.0 K/UL    Eosinophils Absolute 0.1 0.0 - 0.4 K/UL    Basophils Absolute 0.0 0.0 - 0.1 K/UL    Immature Granulocytes Absolute 0.0 0.00 - 0.04 K/UL    Differential Type AUTOMATED     Comprehensive Metabolic Panel    Collection Time: 07/27/24 11:03 PM   Result Value Ref Range    Sodium 140 136 - 145 mmol/L    Potassium 4.0 3.5 - 5.1 mmol/L    Chloride 110 (H) 97 - 108 mmol/L    CO2 23 21 - 32 mmol/L    Anion Gap 7 5 - 15 mmol/L    Glucose 159 (H) 65 - 100 mg/dL    BUN 38 (H) 6 - 20 MG/DL    Creatinine 1.66 (H) 0.70 - 1.30 MG/DL    BUN/Creatinine Ratio 23 (H) 12 - 20      Est, Glom Filt Rate 41 (L) >60 ml/min/1.73m2    Calcium 8.1 (L) 8.5 - 10.1 MG/DL    Total Bilirubin 1.1 (H) 0.2 - 1.0 MG/DL    ALT 32 12 - 78 U/L    AST 31 15 - 37 U/L    Alk Phosphatase 66 45 - 117 U/L    Total Protein 5.6 (L) 6.4 - 8.2 g/dL    Albumin 2.4 (L) 3.5 - 5.0 g/dL    Globulin 3.2 2.0 - 4.0 g/dL    Albumin/Globulin Ratio 0.8 (L) 1.1 - 2.2     Troponin    Collection Time: 07/27/24 11:03 PM   Result Value Ref Range    Troponin, High Sensitivity 32 0 - 76 ng/L   Lipase    Collection Time: 07/27/24 11:03 PM   Result Value Ref Range    Lipase 89 (H) 13 - 75 U/L   Magnesium    Collection Time: 07/27/24 11:03 PM   Result Value Ref Range    Magnesium 1.9 1.6 - 2.4 mg/dL   Troponin    Collection Time: 07/28/24 12:32 AM   Result Value Ref Range    Troponin, High Sensitivity 60 0 - 76 ng/L   APTT    Collection Time: 07/28/24  2:26 AM   Result Value Ref Range    APTT 20.5 (L) 22.1 - 31.0 sec    Therapeutic Range   58.0 - 77.0 SECS   Protime-INR    Collection Time: 07/28/24  2:26 AM   Result Value Ref Range    INR 1.1 0.9 - 1.1

## 2024-07-28 NOTE — ED NOTES
Verbal and bedside ED summary given to receiving RN (Leola HEAD).     - Pt is AO4 on RA; easily roused to conversation from sleep  - Low fowlers  - Call light within reach  - No acute distress reported or observed  - Heparin infusing at 8.1mL/hr  12u/kg/hr  next antiXa draw due 09:45

## 2024-07-28 NOTE — CONSULTS
[facility] EP/ ARRHYTHMIA/ CARDIOLOGY CONSULT      EP Cardiology consult requested by Tisha Wilson MD for non-ST elevation myocardial infarction   PCP:  Valentina Hanson APRN - NP    Primary cardiologist: none     Electrophysiology Service  7/28/2024     Assessment:   Non-ST elevation myocardial infarction   Minimally elevated troponin   No further chest pain on heparin drip  On statin and aspirin now  CKD  HLD  Inguinal hernia repair.       Plan:   High risk for coronary disease has history of mesenteric ischemic  Cotinue heparin for 24 hrs.   MPI tomorrow.   Awaiting echo      []    High complexity decision making was performed  []    Patient is at high-risk of decompensation with multiple organ involvement       HPI:  Alexandr Lewis is a 84 y.o. male with history of mesenteric ischemia, inguinal and abdominal wall hernia status post repiair, CKD who presented with chest pressure and was found to have minimally elevated troponin. He has been started on heparin drip.      No Known Allergies  Past Medical History:   Diagnosis Date    Arthritis     Cancer (HCC)     PROSTATE    Hx SBO 2021    required surgery for lysis of adhesions    Hypertension     Inguinal hernia bilateral, non-recurrent 2024    Kidney disease stage 3 2019    sees Dr Enrrique Polk    Other ill-defined conditions(799.89)     elevated cholesterol    Ventral hernia 2024     Past Surgical History:   Procedure Laterality Date    ABDOMINAL EXPLORATION SURGERY  2021    lysis of adhesions from prostatectomy    COLONOSCOPY,DIAGNOSTIC  9/12/2012         INGUINAL HERNIA REPAIR Bilateral 07/23/2024    1. Laparoscopic repair of bilateral inguinal hernia with mesh, robot assisted 2. Incarcerated anterior abdominal hernia repair 4 CM performed by Case Pryor MD at [facility] MAIN OR    ORTHOPEDIC SURGERY  2000    L SHOULDER-UNKNOWN PROCEDURE    PROSTATE BIOPSY, NEEDLE, SATURATION SAMPLING      PROSTATECTOMY       Family History   Problem Relation

## 2024-07-28 NOTE — ED NOTES
TRANSFER - OUT REPORT:    Verbal report given to Tierra EVANS on Alexandr Lewis  being transferred to [facility] for routine progression of patient care       Report consisted of patient's Situation, Background, Assessment and   Recommendations(SBAR).     Information from the following report(s) Nurse Handoff Report, ED Encounter Summary, ED SBAR, and MAR was reviewed with the receiving nurse.    [place] Fall Assessment:    Presents to emergency department  because of falls (Syncope, seizure, or loss of consciousness): No  Age > 70: Yes  Altered Mental Status, Intoxication with alcohol or substance confusion (Disorientation, impaired judgment, poor safety awaremess, or inability to follow instructions): No  Impaired Mobility: Ambulates or transfers with assistive devices or assistance; Unable to ambulate or transer.: No             Lines:   Peripheral IV 07/27/24 Left;Proximal;Anterior Forearm (Active)        Opportunity for questions and clarification was provided.      Patient transported with:  Monitor and Tech

## 2024-07-28 NOTE — ED PROVIDER NOTES
1.30 MG/DL    BUN/Creatinine Ratio 22 (H) 12 - 20      Est, Glom Filt Rate 53 (L) >60 ml/min/1.73m2    Calcium 8.4 (L) 8.5 - 10.1 MG/DL    Total Bilirubin 1.0 0.2 - 1.0 MG/DL    ALT 26 12 - 78 U/L    AST 18 15 - 37 U/L    Alk Phosphatase 60 45 - 117 U/L    Total Protein 5.3 (L) 6.4 - 8.2 g/dL    Albumin 2.2 (L) 3.5 - 5.0 g/dL    Globulin 3.1 2.0 - 4.0 g/dL    Albumin/Globulin Ratio 0.7 (L) 1.1 - 2.2     CBC with Auto Differential   Result Value Ref Range    WBC 7.3 4.1 - 11.1 K/uL    RBC 3.04 (L) 4.10 - 5.70 M/uL    Hemoglobin 8.1 (L) 12.1 - 17.0 g/dL    Hematocrit 24.9 (L) 36.6 - 50.3 %    MCV 81.9 80.0 - 99.0 FL    MCH 26.6 26.0 - 34.0 PG    MCHC 32.5 30.0 - 36.5 g/dL    RDW 15.6 (H) 11.5 - 14.5 %    Platelets 168 150 - 400 K/uL    MPV 9.8 8.9 - 12.9 FL    Nucleated RBCs 0.0 0  WBC    nRBC 0.00 0.00 - 0.01 K/uL    Neutrophils % 67 32 - 75 %    Lymphocytes % 18 12 - 49 %    Monocytes % 12 5 - 13 %    Eosinophils % 3 0 - 7 %    Basophils % 0 0 - 1 %    Immature Granulocytes % 0 0.0 - 0.5 %    Neutrophils Absolute 4.9 1.8 - 8.0 K/UL    Lymphocytes Absolute 1.3 0.8 - 3.5 K/UL    Monocytes Absolute 0.9 0.0 - 1.0 K/UL    Eosinophils Absolute 0.2 0.0 - 0.4 K/UL    Basophils Absolute 0.0 0.0 - 0.1 K/UL    Immature Granulocytes Absolute 0.0 0.00 - 0.04 K/UL    Differential Type AUTOMATED     Troponin   Result Value Ref Range    Troponin, High Sensitivity 25 0 - 76 ng/L   Magnesium   Result Value Ref Range    Magnesium 1.8 1.6 - 2.4 mg/dL   Phosphorus   Result Value Ref Range    Phosphorus 2.9 2.6 - 4.7 MG/DL   Comprehensive Metabolic Panel w/ Reflex to MG   Result Value Ref Range    Sodium 142 136 - 145 mmol/L    Potassium 4.2 3.5 - 5.1 mmol/L    Chloride 113 (H) 97 - 108 mmol/L    CO2 27 21 - 32 mmol/L    Anion Gap 2 (L) 5 - 15 mmol/L    Glucose 102 (H) 65 - 100 mg/dL    BUN 24 (H) 6 - 20 MG/DL    Creatinine 1.41 (H) 0.70 - 1.30 MG/DL    BUN/Creatinine Ratio 17 12 - 20      Est, Glom Filt Rate 49 (L) >60  Septal 7.50     LA Volume Index A/L 32 16 - 34 mL/m2    LVOT Stroke Volume Index 35.2 mL/m2    LVOT Area 3.5 cm2    LA Volume Index A-L A2C 35 (A) 16 - 34 mL/m2    LA Volume Index A-L A4C 28 16 - 34 mL/m2    LA Volume Index MOD A2C 30 16 - 34 ml/m2    LA Volume Index MOD A4C 25 16 - 34 ml/m2    LA Size Index 2.22 cm/m2    Ascending Aorta Index 1.88 cm/m2    AV Velocity Ratio 0.91     LVOT:AV VTI Index 0.77     ARMEN/BSA VTI 1.5 cm2/m2    ARMEN/BSA Peak Velocity 1.8 cm2/m2    MV:LVOT VTI Index 1.34        RADIOLOGIC STUDIES:   Non x-ray images such as CT, Ultrasound and MRI are read by the radiologist. X-ray images are visualized and preliminarily interpreted by the ED Provider with the findings as listed in the ED Course section below.     Interpretation per the Radiologist is listed below, if available at the time of this note:    XR CHEST PORTABLE   Final Result      No acute process on portable chest.         Electronically signed by MARTA RUST MD      XR CHEST PORTABLE   Final Result      Nonspecific bilateral opacities are new. Multiple skin folds. Lordotic   positioning. Recommend PA and lateral chest views when the patient can better   tolerate.         Electronically signed by Vamshi Pantoja          SCREENINGS                    ED Course and Differential Diagnosis/MDM     1:39 AM EDT DDx, ED Course, and Reassessment:    Vitals: Patient Vitals for the past 24 hrs:   BP Temp Temp src Pulse Resp SpO2   07/30/24 2030 104/62 98.8 °F (37.1 °C) Oral 77 17 100 %   07/30/24 1526 118/61 98.4 °F (36.9 °C) Oral 72 24 100 %   07/30/24 0828 114/73 98.4 °F (36.9 °C) -- 69 18 98 %   07/30/24 0301 119/63 -- -- 81 -- 99 %       ED COURSE/Records Reviewed with summary (prior medical records and Nursing notes)  Nursing Notes    ED Course as of 07/30/24 2231   Sat Jul 27, 2024 2221 EKG interpreted by me.  Shows Normal Sinus rhythm, low voltage with a HR of 89.  No ST elevations or depressions concerning for acute ischemia.   Occasional PVC.  Yovani Treadwell MD   [ZD]   Becki Jul 28, 2024   0130 Cardiology paged [ZD]   5298 Discussed with cardiology who recommended heparin and admission will see tomorrow. [ZD]      ED Course User Index  [ZD] Yovani Treadwell MD       Patient was given the following medications:    Medications   aspirin chewable tablet 81 mg ( Oral Automatically Held 8/3/24 0900)   atorvastatin (LIPITOR) tablet 80 mg (80 mg Oral Given 7/30/24 0828)   ferrous sulfate (IRON 325) tablet 325 mg (325 mg Oral Given 7/30/24 0828)   Vitamin D (CHOLECALCIFEROL) tablet 1,000 Units (1,000 Units Oral Given 7/30/24 0828)   sodium chloride flush 0.9 % injection 5-40 mL (10 mLs IntraVENous Given 7/30/24 2031)   sodium chloride flush 0.9 % injection 5-40 mL (has no administration in time range)   0.9 % sodium chloride infusion (has no administration in time range)   acetaminophen (TYLENOL) tablet 650 mg (650 mg Oral Given 7/29/24 1527)     Or   acetaminophen (TYLENOL) suppository 650 mg ( Rectal See Alternative 7/29/24 1527)   lisinopril (PRINIVIL;ZESTRIL) tablet 20 mg (20 mg Oral Given 7/30/24 0828)     And   hydroCHLOROthiazide (HYDRODIURIL) tablet 25 mg (25 mg Oral Given 7/30/24 0828)   nitroGLYCERIN (NITROSTAT) SL tablet 0.4 mg (has no administration in time range)   cefTRIAXone (ROCEPHIN) 1,000 mg in sodium chloride 0.9 % 50 mL IVPB (mini-bag) (0 mg IntraVENous Stopped 7/30/24 1108)   azithromycin (ZITHROMAX) 500 mg in sodium chloride 0.9 % 250 mL IVPB (Gopo2Vmk) (0 mg IntraVENous Stopped 7/29/24 1429)   enoxaparin (LOVENOX) injection 40 mg ( SubCUTAneous Automatically Held 8/4/24 1500)   heparin (porcine) injection 4,000 Units (4,000 Units IntraVENous Given 7/28/24 0336)   perflutren lipid microspheres (DEFINITY) injection 1.5 mL (1.5 mLs IntraVENous Given 7/29/24 0937)   regadenoson (LEXISCAN) injection 0.4 mg (0.4 mg IntraVENous Given 7/29/24 1035)   technetium sestamibi (CARDIOLITE) injection 30.3 millicurie (30.3 millicuries

## 2024-07-28 NOTE — ED TRIAGE NOTES
Pt. Presents with left sided, mid-sternal chest pain onset 2hrs ago at home. Pt. Was given 324 of aspirin on his way to our facility and 1 SL nitro; pt reports chest pain has decreased and he is feeling better on arrival. Pt A/Ox4, RR even and unlabored, NAD.

## 2024-07-28 NOTE — ED NOTES
Pt now awake and ambulated to the hallway bathroom with one person assist for safety as needed. Weak but independent gait. Would recommend that he continue having someone at his side for now.

## 2024-07-29 ENCOUNTER — APPOINTMENT (OUTPATIENT)
Facility: HOSPITAL | Age: 84
End: 2024-07-29
Payer: MEDICARE

## 2024-07-29 ENCOUNTER — APPOINTMENT (OUTPATIENT)
Facility: HOSPITAL | Age: 84
End: 2024-07-29
Attending: STUDENT IN AN ORGANIZED HEALTH CARE EDUCATION/TRAINING PROGRAM
Payer: MEDICARE

## 2024-07-29 LAB
ALBUMIN SERPL-MCNC: 2.2 G/DL (ref 3.5–5)
ALBUMIN/GLOB SERPL: 0.7 (ref 1.1–2.2)
ALP SERPL-CCNC: 60 U/L (ref 45–117)
ALT SERPL-CCNC: 26 U/L (ref 12–78)
ANION GAP SERPL CALC-SCNC: 5 MMOL/L (ref 5–15)
AST SERPL-CCNC: 18 U/L (ref 15–37)
BASOPHILS # BLD: 0 K/UL (ref 0–0.1)
BASOPHILS NFR BLD: 0 % (ref 0–1)
BILIRUB SERPL-MCNC: 1 MG/DL (ref 0.2–1)
BUN SERPL-MCNC: 29 MG/DL (ref 6–20)
BUN/CREAT SERPL: 22 (ref 12–20)
CALCIUM SERPL-MCNC: 8.4 MG/DL (ref 8.5–10.1)
CHLORIDE SERPL-SCNC: 112 MMOL/L (ref 97–108)
CO2 SERPL-SCNC: 25 MMOL/L (ref 21–32)
CREAT SERPL-MCNC: 1.34 MG/DL (ref 0.7–1.3)
DIFFERENTIAL METHOD BLD: ABNORMAL
ECHO AO ASC DIAM: 3.3 CM
ECHO AO ASCENDING AORTA INDEX: 1.88 CM/M2
ECHO AV AREA PEAK VELOCITY: 3.1 CM2
ECHO AV AREA VTI: 2.7 CM2
ECHO AV AREA/BSA PEAK VELOCITY: 1.8 CM2/M2
ECHO AV AREA/BSA VTI: 1.5 CM2/M2
ECHO AV MEAN GRADIENT: 3 MMHG
ECHO AV MEAN VELOCITY: 0.8 M/S
ECHO AV PEAK GRADIENT: 5 MMHG
ECHO AV PEAK VELOCITY: 1.1 M/S
ECHO AV VELOCITY RATIO: 0.91
ECHO AV VTI: 23.1 CM
ECHO BSA: 1.77 M2
ECHO BSA: 1.77 M2
ECHO LA DIAMETER INDEX: 2.22 CM/M2
ECHO LA DIAMETER: 3.9 CM
ECHO LA VOL A-L A2C: 62 ML (ref 18–58)
ECHO LA VOL A-L A4C: 49 ML (ref 18–58)
ECHO LA VOL MOD A2C: 53 ML (ref 18–58)
ECHO LA VOL MOD A4C: 44 ML (ref 18–58)
ECHO LA VOLUME AREA LENGTH: 57 ML
ECHO LA VOLUME INDEX A-L A2C: 35 ML/M2 (ref 16–34)
ECHO LA VOLUME INDEX A-L A4C: 28 ML/M2 (ref 16–34)
ECHO LA VOLUME INDEX AREA LENGTH: 32 ML/M2 (ref 16–34)
ECHO LA VOLUME INDEX MOD A2C: 30 ML/M2 (ref 16–34)
ECHO LA VOLUME INDEX MOD A4C: 25 ML/M2 (ref 16–34)
ECHO LV E' LATERAL VELOCITY: 10 CM/S
ECHO LV E' SEPTAL VELOCITY: 8 CM/S
ECHO LV EDV A4C: 95 ML
ECHO LV EDV INDEX A4C: 54 ML/M2
ECHO LV EJECTION FRACTION A4C: 69 %
ECHO LV ESV A4C: 29 ML
ECHO LV ESV INDEX A4C: 16 ML/M2
ECHO LV FRACTIONAL SHORTENING: 33 % (ref 28–44)
ECHO LV INTERNAL DIMENSION DIASTOLE INDEX: 2.78 CM/M2
ECHO LV INTERNAL DIMENSION DIASTOLIC: 4.9 CM (ref 4.2–5.9)
ECHO LV INTERNAL DIMENSION SYSTOLIC INDEX: 1.88 CM/M2
ECHO LV INTERNAL DIMENSION SYSTOLIC: 3.3 CM
ECHO LV IVSD: 0.8 CM (ref 0.6–1)
ECHO LV MASS 2D: 141.9 G (ref 88–224)
ECHO LV MASS INDEX 2D: 80.6 G/M2 (ref 49–115)
ECHO LV POSTERIOR WALL DIASTOLIC: 0.9 CM (ref 0.6–1)
ECHO LV RELATIVE WALL THICKNESS RATIO: 0.37
ECHO LVOT AREA: 3.5 CM2
ECHO LVOT AV VTI INDEX: 0.77
ECHO LVOT DIAM: 2.1 CM
ECHO LVOT MEAN GRADIENT: 1 MMHG
ECHO LVOT PEAK GRADIENT: 4 MMHG
ECHO LVOT PEAK VELOCITY: 1 M/S
ECHO LVOT STROKE VOLUME INDEX: 35.2 ML/M2
ECHO LVOT SV: 62 ML
ECHO LVOT VTI: 17.9 CM
ECHO MV A VELOCITY: 0.85 M/S
ECHO MV AREA VTI: 2.6 CM2
ECHO MV E DECELERATION TIME (DT): 263 MS
ECHO MV E VELOCITY: 0.6 M/S
ECHO MV E/A RATIO: 0.71
ECHO MV E/E' LATERAL: 6
ECHO MV E/E' RATIO (AVERAGED): 6.75
ECHO MV E/E' SEPTAL: 7.5
ECHO MV LVOT VTI INDEX: 1.34
ECHO MV MAX VELOCITY: 0.9 M/S
ECHO MV MEAN GRADIENT: 1 MMHG
ECHO MV MEAN VELOCITY: 0.5 M/S
ECHO MV PEAK GRADIENT: 3 MMHG
ECHO MV VTI: 23.9 CM
ECHO RV FREE WALL PEAK S': 12 CM/S
ECHO RV TAPSE: 2.5 CM (ref 1.7–?)
ECHO TV REGURGITANT MAX VELOCITY: 2.52 M/S
ECHO TV REGURGITANT PEAK GRADIENT: 25 MMHG
EKG ATRIAL RATE: 89 BPM
EKG DIAGNOSIS: NORMAL
EKG DIAGNOSIS: NORMAL
EKG P AXIS: 28 DEGREES
EKG P-R INTERVAL: 154 MS
EKG Q-T INTERVAL: 354 MS
EKG QRS DURATION: 94 MS
EKG QTC CALCULATION (BAZETT): 430 MS
EKG R AXIS: 35 DEGREES
EKG T AXIS: 58 DEGREES
EKG VENTRICULAR RATE: 89 BPM
EOSINOPHIL # BLD: 0.2 K/UL (ref 0–0.4)
EOSINOPHIL NFR BLD: 3 % (ref 0–7)
ERYTHROCYTE [DISTWIDTH] IN BLOOD BY AUTOMATED COUNT: 15.6 % (ref 11.5–14.5)
GLOBULIN SER CALC-MCNC: 3.1 G/DL (ref 2–4)
GLUCOSE SERPL-MCNC: 97 MG/DL (ref 65–100)
HCT VFR BLD AUTO: 24.9 % (ref 36.6–50.3)
HGB BLD-MCNC: 8.1 G/DL (ref 12.1–17)
IMM GRANULOCYTES # BLD AUTO: 0 K/UL (ref 0–0.04)
IMM GRANULOCYTES NFR BLD AUTO: 0 % (ref 0–0.5)
LYMPHOCYTES # BLD: 1.3 K/UL (ref 0.8–3.5)
LYMPHOCYTES NFR BLD: 18 % (ref 12–49)
MAGNESIUM SERPL-MCNC: 1.8 MG/DL (ref 1.6–2.4)
MCH RBC QN AUTO: 26.6 PG (ref 26–34)
MCHC RBC AUTO-ENTMCNC: 32.5 G/DL (ref 30–36.5)
MCV RBC AUTO: 81.9 FL (ref 80–99)
MONOCYTES # BLD: 0.9 K/UL (ref 0–1)
MONOCYTES NFR BLD: 12 % (ref 5–13)
NEUTS SEG # BLD: 4.9 K/UL (ref 1.8–8)
NEUTS SEG NFR BLD: 67 % (ref 32–75)
NRBC # BLD: 0 K/UL (ref 0–0.01)
NRBC BLD-RTO: 0 PER 100 WBC
PHOSPHATE SERPL-MCNC: 2.9 MG/DL (ref 2.6–4.7)
PLATELET # BLD AUTO: 168 K/UL (ref 150–400)
PMV BLD AUTO: 9.8 FL (ref 8.9–12.9)
POTASSIUM SERPL-SCNC: 4.1 MMOL/L (ref 3.5–5.1)
PROT SERPL-MCNC: 5.3 G/DL (ref 6.4–8.2)
RBC # BLD AUTO: 3.04 M/UL (ref 4.1–5.7)
SODIUM SERPL-SCNC: 142 MMOL/L (ref 136–145)
STRESS BASELINE DIAS BP: 62 MMHG
STRESS BASELINE HR: 61 BPM
STRESS BASELINE SYS BP: 117 MMHG
STRESS ESTIMATED WORKLOAD: 1 METS
STRESS O2 SAT PEAK: 96 %
STRESS O2 SAT REST: 95 %
STRESS PEAK DIAS BP: 60 MMHG
STRESS PEAK SYS BP: 125 MMHG
STRESS PERCENT HR ACHIEVED: 81 %
STRESS POST PEAK HR: 111 BPM
STRESS RATE PRESSURE PRODUCT: NORMAL BPM*MMHG
STRESS TARGET HR: 137 BPM
TROPONIN I SERPL HS-MCNC: 25 NG/L (ref 0–76)
UFH PPP CHRO-ACNC: 0.43 IU/ML
WBC # BLD AUTO: 7.3 K/UL (ref 4.1–11.1)

## 2024-07-29 PROCEDURE — 85025 COMPLETE CBC W/AUTO DIFF WBC: CPT

## 2024-07-29 PROCEDURE — 6360000002 HC RX W HCPCS: Performed by: STUDENT IN AN ORGANIZED HEALTH CARE EDUCATION/TRAINING PROGRAM

## 2024-07-29 PROCEDURE — A9500 TC99M SESTAMIBI: HCPCS | Performed by: INTERNAL MEDICINE

## 2024-07-29 PROCEDURE — 84484 ASSAY OF TROPONIN QUANT: CPT

## 2024-07-29 PROCEDURE — 71045 X-RAY EXAM CHEST 1 VIEW: CPT

## 2024-07-29 PROCEDURE — 36415 COLL VENOUS BLD VENIPUNCTURE: CPT

## 2024-07-29 PROCEDURE — 85520 HEPARIN ASSAY: CPT

## 2024-07-29 PROCEDURE — C8929 TTE W OR WO FOL WCON,DOPPLER: HCPCS

## 2024-07-29 PROCEDURE — 6360000004 HC RX CONTRAST MEDICATION: Performed by: INTERNAL MEDICINE

## 2024-07-29 PROCEDURE — 3430000000 HC RX DIAGNOSTIC RADIOPHARMACEUTICAL: Performed by: INTERNAL MEDICINE

## 2024-07-29 PROCEDURE — 2580000003 HC RX 258: Performed by: STUDENT IN AN ORGANIZED HEALTH CARE EDUCATION/TRAINING PROGRAM

## 2024-07-29 PROCEDURE — 83735 ASSAY OF MAGNESIUM: CPT

## 2024-07-29 PROCEDURE — 6360000002 HC RX W HCPCS: Performed by: INTERNAL MEDICINE

## 2024-07-29 PROCEDURE — 6370000000 HC RX 637 (ALT 250 FOR IP): Performed by: STUDENT IN AN ORGANIZED HEALTH CARE EDUCATION/TRAINING PROGRAM

## 2024-07-29 PROCEDURE — 80053 COMPREHEN METABOLIC PANEL: CPT

## 2024-07-29 PROCEDURE — 1100000003 HC PRIVATE W/ TELEMETRY

## 2024-07-29 PROCEDURE — 84100 ASSAY OF PHOSPHORUS: CPT

## 2024-07-29 PROCEDURE — 2580000003 HC RX 258: Performed by: INTERNAL MEDICINE

## 2024-07-29 RX ORDER — ENOXAPARIN SODIUM 100 MG/ML
40 INJECTION SUBCUTANEOUS EVERY 24 HOURS
Status: DISCONTINUED | OUTPATIENT
Start: 2024-07-29 | End: 2024-07-31 | Stop reason: HOSPADM

## 2024-07-29 RX ORDER — TETRAKIS(2-METHOXYISOBUTYLISOCYANIDE)COPPER(I) TETRAFLUOROBORATE 1 MG/ML
30.3 INJECTION, POWDER, LYOPHILIZED, FOR SOLUTION INTRAVENOUS
Status: COMPLETED | OUTPATIENT
Start: 2024-07-29 | End: 2024-07-29

## 2024-07-29 RX ORDER — REGADENOSON 0.08 MG/ML
0.4 INJECTION, SOLUTION INTRAVENOUS
Status: COMPLETED | OUTPATIENT
Start: 2024-07-29 | End: 2024-07-29

## 2024-07-29 RX ORDER — TETRAKIS(2-METHOXYISOBUTYLISOCYANIDE)COPPER(I) TETRAFLUOROBORATE 1 MG/ML
10.2 INJECTION, POWDER, LYOPHILIZED, FOR SOLUTION INTRAVENOUS
Status: CANCELLED | OUTPATIENT
Start: 2024-07-29

## 2024-07-29 RX ADMIN — CEFTRIAXONE SODIUM 1000 MG: 1 INJECTION, POWDER, FOR SOLUTION INTRAMUSCULAR; INTRAVENOUS at 12:01

## 2024-07-29 RX ADMIN — REGADENOSON 0.4 MG: 0.08 INJECTION, SOLUTION INTRAVENOUS at 10:35

## 2024-07-29 RX ADMIN — TETRAKIS(2-METHOXYISOBUTYLISOCYANIDE)COPPER(I) TETRAFLUOROBORATE 30.3 MILLICURIE: 1 INJECTION, POWDER, LYOPHILIZED, FOR SOLUTION INTRAVENOUS at 10:40

## 2024-07-29 RX ADMIN — ENOXAPARIN SODIUM 40 MG: 100 INJECTION SUBCUTANEOUS at 15:20

## 2024-07-29 RX ADMIN — HEPARIN SODIUM AND DEXTROSE 12 UNITS/KG/HR: 10000; 5 INJECTION INTRAVENOUS at 11:56

## 2024-07-29 RX ADMIN — SODIUM CHLORIDE, PRESERVATIVE FREE 10 ML: 5 INJECTION INTRAVENOUS at 12:02

## 2024-07-29 RX ADMIN — SODIUM CHLORIDE, PRESERVATIVE FREE 5 ML: 5 INJECTION INTRAVENOUS at 21:00

## 2024-07-29 RX ADMIN — ACETAMINOPHEN 650 MG: 325 TABLET ORAL at 15:27

## 2024-07-29 RX ADMIN — AZITHROMYCIN MONOHYDRATE 500 MG: 500 INJECTION, POWDER, LYOPHILIZED, FOR SOLUTION INTRAVENOUS at 13:29

## 2024-07-29 RX ADMIN — PERFLUTREN 1.5 ML: 6.52 INJECTION, SUSPENSION INTRAVENOUS at 09:37

## 2024-07-29 ASSESSMENT — PAIN SCALES - GENERAL
PAINLEVEL_OUTOF10: 8
PAINLEVEL_OUTOF10: 0

## 2024-07-29 ASSESSMENT — PAIN SCALES - WONG BAKER: WONGBAKER_NUMERICALRESPONSE: NO HURT

## 2024-07-29 ASSESSMENT — PAIN DESCRIPTION - ORIENTATION: ORIENTATION: RIGHT

## 2024-07-29 ASSESSMENT — ENCOUNTER SYMPTOMS
SHORTNESS OF BREATH: 0
BLOOD IN STOOL: 0
EYE PAIN: 0

## 2024-07-29 ASSESSMENT — PAIN DESCRIPTION - LOCATION: LOCATION: GROIN

## 2024-07-29 NOTE — PROGRESS NOTES
Comprehensive Nutrition Assessment    Type and Reason for Visit:  Initial, Positive Nutrition Screen    Nutrition Recommendations/Plan:   Continue current diet  Ensure plus HP 1x/day  Monitor and record PO intakes, supplement acceptance, and Bms in I/Os     Malnutrition Assessment:  Malnutrition Status:  No malnutrition (07/29/24 1435)    Context:  Acute Illness     Findings of the 6 clinical characteristics of malnutrition:  Energy Intake:  No significant decrease in energy intake  Weight Loss:  No significant weight loss     Body Fat Loss:  No significant body fat loss     Muscle Mass Loss:  No significant muscle mass loss    Fluid Accumulation:  No significant fluid accumulation     Strength:  Not Performed    Nutrition Assessment:    Admitted for chest pain. PMHx includes SBO, HTN, CKD3, hernia. Screened for MST2, pt unsure of wt loss with no decrease in appetite/intake. Per EMR wt hx, trend of slow weight loss, but not nutritionally significant %s for time frames (-4.3% x 3 months, -8.9% x 6 months, -12.2% x 1 year). Was NPO today for stress test, now on cardiac diet. Plan to continue diet, add ensure plus HP 1x/day. Labs: Na 142, K 4.1, BUN 29, Creat 1.34, Gluc 97, Phos 2.9, Mag 1.8. Meds: atorvastatin, ferrous sulfate, cholecalciferol    Nutrition Related Findings:    NFPE without acute findings. No n/v, d/c, or problems chewing/swallowing. Trace b/l LE edema. BM 7/29. Wound Type: None       Current Nutrition Intake & Therapies:    Average Meal Intake: %  Average Supplements Intake: None Ordered  ADULT DIET; Regular; Low Fat/Low Chol/High Fiber/2 gm Na    Anthropometric Measures:  Height: 167.6 cm (5' 5.98\")  Ideal Body Weight (IBW): 142 lbs (65 kg)    Current Body Weight: 67.4 kg (148 lb 9.4 oz), 104.6 % IBW. Weight Source: Standing Scale  Current BMI (kg/m2): 24  BMI Categories: Normal Weight (BMI 22.0 to 24.9) age over 65    Estimated Daily Nutrient Needs:  Energy Requirements Based On:  Kcal/kg  Weight Used for Energy Requirements: Current  Energy (kcal/day): 1685kcal (25kcal/kg)  Weight Used for Protein Requirements: Current  Protein (g/day): 67g (1g/kg)  Method Used for Fluid Requirements: 1 ml/kcal  Fluid (ml/day): 1685mL    Nutrition Diagnosis:   No nutrition diagnosis at this time     Nutrition Interventions:   Food and/or Nutrient Delivery: Continue Current Diet, Start Oral Nutrition Supplement  Nutrition Education/Counseling: No recommendation at this time  Coordination of Nutrition Care: Continue to monitor while inpatient    Goals:  Goals: Meet at least 75% of estimated needs, by next RD assessment    Nutrition Monitoring and Evaluation:   Behavioral-Environmental Outcomes: None Identified  Food/Nutrient Intake Outcomes: Food and Nutrient Intake, Supplement Intake  Physical Signs/Symptoms Outcomes: Meal Time Behavior, Weight    Discharge Planning:    Too soon to determine     Fernanda Sanchez RD  Contact: [phone]

## 2024-07-29 NOTE — PROGRESS NOTES
End of Shift Note    Bedside shift change report given to Christine EVANS (oncoming nurse) by ERIBERTO DUNN RN (offgoing nurse).  Report included the following information SBAR, Kardex, and MAR    Shift worked:  8483-1293     Shift summary and any significant changes:     Pt on continuous Heparin, labs drawn and sent, no complaint of pain, NPO @ midnight for stress test. Hourly rounding completed.     Concerns for physician to address:  NO     Zone phone for oncoming shift:   NA       Activity:  Level of Assistance: Independent    Cardiac:   Cardiac Monitoring:  yes    Access:  Current line(s): PIV     Genitourinary:   Urinary Status: Voiding    Respiratory:   O2 Device: None (Room air)    GI:  Current diet: Diet NPO Exceptions are: Sips of Water with Meds    Pain Management:   Patient states pain is manageable on current regimen: YES    Skin:  Rudy Scale Score: 20  Interventions: Wound Offloading (Prevention Methods): Bed, pressure reduction mattress, Elevate heels, Pillows, Repositioning  Pressure injury: no    Patient Safety:  Fall Score: Doyle Total Score: 25  Fall Risk Interventions  Nursing Judgement-Fall Risk High(Add Comments): Yes  Toilet Every 2 Hours-In Advance of Need: Yes  Hourly Visual Checks: In bed  Fall Visual Posted: Armband, Fall sign posted, Socks  Room Door Open: Yes  Alarm On: Bed  Patient Moved Closer to Nursing Station: No    Active Consults:  IP CONSULT TO CARDIOLOGY    Length of Stay:  Expected LOS: 4  Actual LOS: 1      ERIBERTO DUNN RN

## 2024-07-29 NOTE — CARE COORDINATION
Care Management Initial Assessment       RUR: 13%  Readmission? Yes - D/C on 7/24/24 - Re admit on 7/28/24 1st IM letter given? Yes - 7/2824 1st  letter given: No    Pt's chart was reviewed. CM introduce self, explain role and confirm demographics with pt.    Pt lives with his sister in an one story home with three steps to enter.    No hx of home health, SNF or inpatient rehab.    Pt uses [facility] at [place].    At the time of d/c pt's family will transport.    CM will follow and assist with d/c planning.        07/29/24 1215   Service Assessment   Patient Orientation Alert and Oriented   Cognition Alert   History Provided By Patient;Medical Record   Primary Caregiver Self   Support Systems Family Members   Patient's Healthcare Decision Maker is: Legal Next of Kin  (Pt's sebastián Haney)   PCP Verified by CM Yes   Last Visit to PCP Within last 6 months   Prior Functional Level Independent in ADLs/IADLs   Current Functional Level Independent in ADLs/IADLs   Can patient return to prior living arrangement Yes   Family able to assist with home care needs: Yes   Would you like for me to discuss the discharge plan with any other family members/significant others, and if so, who? Yes  (Pt's sebastián Haney)   Financial Resources Medicare   Community Resources None   Social/Functional History   Lives With Family   Type of Home House   Active  No   Patient's  Info Pt's family   Discharge Planning   Type of Residence House   Current Services Prior To Admission None   Patient expects to be discharged to: House     Readmission Assessment  Number of Days since last admission?: 1-7 days  Previous Disposition: Home with Family  Who is being Interviewed: Patient  What was the patient's/caregiver's perception as to why they think they needed to return back to the hospital?: Other (Comment) (Pt stated he was feeling bad and also he needed his heart check out)  Did you visit your Primary Care

## 2024-07-29 NOTE — CONSULTS
Consult Note            Date:7/29/2024        Patient Name:Alexandr Lewis     YOB: 1940     Age:84 y.o.    Inpatient consult to General Surgery  Consult performed by: Case Pryor MD  Consult ordered by: Tisha Wilson MD  Reason for consult: Right groin swelling        Informed of patient's admission this afternoon.    POD#6 s/p bilateral inguinal hernia repair and incarcerated anterior abdominal hernia repair    Presented with chest pain.  Has been cleared by cardiology.    Now with large right groin hematoma second to being given aspirin and heparin on postop day #4    Repeat hemoglobin in the morning  Ice to right groin  Scrotal support    Hold all blood thinners  Can resume his 81 mg aspirin in 1 week        History Obtained From   Patient and family present at the bedside    History of Present Illness     Last week patient underwent a robot-assisted bilateral inguinal hernia repair and ventral hernia repair.  The right inguinal hernia was very large and required extensive dissection for repair.  It took some effort to ensure hemostasis including the use of powdered Surgicel.  He was admitted postoperatively for observation and remained clinically stable.  He was ambulating without difficulty.  No swelling.  And was discharged home.    He presented to the emergency room 2 nights ago with chest pain and was started on aspirin and heparin and Lovenox.  Patient reports that he subsequently developed swelling in his right groin.    Tolerating p.o.  No nausea      Past Medical History     Past Medical History:   Diagnosis Date    Arthritis     Cancer (HCC)     PROSTATE    Hx SBO 2021    required surgery for lysis of adhesions    Hypertension     Inguinal hernia bilateral, non-recurrent 2024    Kidney disease stage 3 2019    sees Dr Enrrique Polk    Other ill-defined conditions(799.89)     elevated cholesterol    Ventral hernia 2024        Past Surgical History     Past Surgical History:   Procedure

## 2024-07-29 NOTE — PLAN OF CARE
Problem: Discharge Planning  Goal: Discharge to home or other facility with appropriate resources  7/29/2024 0411 by Terrell Cyr RN  Outcome: Progressing  7/29/2024 0010 by Allan Bautista RN  Outcome: Progressing  Flowsheets (Taken 7/28/2024 1954 by Terrell Cyr RN)  Discharge to home or other facility with appropriate resources: Identify barriers to discharge with patient and caregiver     Problem: Safety - Adult  Goal: Free from fall injury  7/29/2024 0411 by Terrell Cyr RN  Outcome: Progressing  7/29/2024 0010 by Allan Bautista RN  Outcome: Progressing

## 2024-07-29 NOTE — PROGRESS NOTES
End of Shift Note    Bedside shift change report given to RN  (oncoming nurse) by Catherine Wyatt RN (offgoing nurse).  Report included the following information SBAR, Intake/Output, and MAR    Shift worked:  7A-7P     Shift summary and any significant changes:     Pt AOX3, able to make needs known. Pt completed stress test today. Order received to stop heparin drip. Pt c/o right groin hernia pain, Dr. Wilson notified. New order received for general surgery consult, scrotal supporter, and application of ice to the groin area. Pt cont on IV Abx, no fever noted. Denies chest pain at this time. Safety precaution maintained.    Concerns for physician to address:       Zone phone for oncoming shift:          Activity:     Number times ambulated in hallways past shift: 0  Number of times OOB to chair past shift: 2    Cardiac:   Cardiac Monitoring: Yes           Access:  Current line(s): PIV     Genitourinary:   Urinary status: voiding    Respiratory:      Chronic home O2 use?: NO  Incentive spirometer at bedside: NO       GI:     Current diet:  ADULT DIET; Regular; Low Fat/Low Chol/High Fiber/2 gm Na  ADULT ORAL NUTRITION SUPPLEMENT; Breakfast; Standard High Calorie/High Protein Oral Supplement  Passing flatus: YES  Tolerating current diet: YES       Pain Management:   Patient states pain is manageable on current regimen: YES    Skin:     Interventions: increase time out of bed, limit briefs, and nutritional support    Patient Safety:  Fall Score:    Interventions: bed/chair alarm, assistive device (walker, cane. etc), gripper socks, and pt to call before getting OOB       Length of Stay:  Expected LOS: 4  Actual LOS: 1      Catherine Wyatt RN

## 2024-07-29 NOTE — PROGRESS NOTES
Hospitalist Progress Note    NAME:   Alexandr Lewis   : 1940   MRN: [id number]     Date/Time: 2024 7:12 AM  Patient PCP: Valentina Hanson APRN - NP    Estimated discharge date:   Barriers: Surgical consult for inguinal hernia      Assessment / Plan:  NSTEMI  - Chest pain started earlier this evening - central chest pressure, no associated symptoms  - Troponin 32, 1 hour recheck increasing to 60  - Cardiology was consulted from the ED, recommended starting heparin gtt  - Will continue heparin gtt  - Continue aspirin   - Increase home statin to 80mg qhs   - Monitor HR overnight, consider BB   - Check TTE   - Cardiology consult ordered, appreciate assistance   : Patient is on heparin drip.  Continue with aspirin, statin. Waiting for echocardiogram, cardiology consult. Patient to remain n.p.o. until seen by cardiology.  : Troponin trending down overnight and this morning and last 1 was 25.  Appreciate cardiology input.  Patient got a Lexiscan today that did not show any ischemia or infarction and LVEF of 54%.  I touched base with cardiology Dr. Smith and patient has been cleared to be discharged home.  Patient is to follow-up with PCP outpatient.  Will stop heparin drip and start on prophylactic dose of Lovenox.    Right inguinal hernia  : Patient reports increased discomfort in the right inguinal region.  He reports that he has had 3 hernia in the past and currently they want in the right inguinal region seems to be bothering him.  He would like to get it fixed if possible.  Will consult general surgery.    Possible pneumonia  : Chest x-ray showed multiple opacities and will start on Rocephin and azithromycin to cover for possible pneumonia.  Will also repeat chest x-ray in the morning for interval change.     CKD  - Cr 1.66 from baseline 1.77 - 1.9  - Hold home lisinopril-HCTZ for now   - Repeat BMP in AM      HLD  - Increase home atorvastatin to 80qhs        Medical Decision  - no change compared to H&P    Procedures: see electronic medical records for all procedures/Xrays and details which were not copied into this note but were reviewed prior to creation of Plan.      LABS:  I reviewed today's most current labs and imaging studies.  Pertinent labs include:  Recent Labs     07/27/24 2303 07/28/24 0621 07/29/24  0142   WBC 8.1 8.1 7.3   HGB 8.4* 8.3* 8.1*   HCT 26.2* 24.7* 24.9*    176 168     Recent Labs     07/27/24 2303 07/28/24 0226 07/28/24 0621 07/29/24  0142     --  141 142   K 4.0  --  3.7 4.1   *  --  112* 112*   CO2 23  --  23 25   GLUCOSE 159*  --  94 97   BUN 38*  --  35* 29*   CREATININE 1.66*  --  1.47* 1.34*   CALCIUM 8.1*  --  8.2* 8.4*   MG 1.9  --   --  1.8   PHOS  --   --   --  2.9   BILITOT 1.1*  --  1.0 1.0   AST 31  --  23 18   ALT 32  --  30 26   INR  --  1.1  --   --        Signed: Tisha Wilson MD

## 2024-07-29 NOTE — PROGRESS NOTES
bilaterally.  Skin: Warm and dry.    []         Post-cath site without hematoma, bruit, tenderness, or thrill.  Distal pulses intact.    PMH/SH reviewed - no change compared to H&P    Data Review    Telemetry: sinus rhythm     EKG:   [x]  No new EKG for review    Lab Data Personally Reviewed:    Recent Labs     07/28/24 0621 07/29/24  0142   WBC 8.1 7.3   HGB 8.3* 8.1*   HCT 24.7* 24.9*    168     Recent Labs     07/28/24  0226   INR 1.1   APTT 20.5*      Recent Labs     07/27/24 2303 07/28/24 0621 07/29/24  0142    141 142   K 4.0 3.7 4.1   * 112* 112*   CO2 23 23 25   BUN 38* 35* 29*   MG 1.9  --  1.8     No results for input(s): \"CPK\" in the last 72 hours.    Invalid input(s): \"CPKMB\", \"CKNDX\", \"TROIQ\"  No results found for: \"CHOL\", \"CHLST\", \"CHOLV\", \"HDL\", \"HDLC\", \"LDL\"    Recent Labs     07/27/24 2303 07/28/24 0621 07/29/24  0142   GLOB 3.2 3.1 3.1     No results for input(s): \"PH\", \"PCO2\", \"PO2\" in the last 72 hours.    Medications Personally Reviewed:    Current Facility-Administered Medications   Medication Dose Route Frequency    heparin (porcine) injection 4,000 Units  4,000 Units IntraVENous PRN    heparin (porcine) injection 2,000 Units  30 Units/kg IntraVENous PRN    heparin 25,000 units in dextrose 5% 250 mL (premix) infusion  12-30 Units/kg/hr IntraVENous Continuous    aspirin chewable tablet 81 mg  81 mg Oral Daily    atorvastatin (LIPITOR) tablet 80 mg  80 mg Oral Daily    ferrous sulfate (IRON 325) tablet 325 mg  325 mg Oral Daily with breakfast    Vitamin D (CHOLECALCIFEROL) tablet 1,000 Units  1,000 Units Oral Daily    sodium chloride flush 0.9 % injection 5-40 mL  5-40 mL IntraVENous 2 times per day    sodium chloride flush 0.9 % injection 5-40 mL  5-40 mL IntraVENous PRN    0.9 % sodium chloride infusion   IntraVENous PRN    acetaminophen (TYLENOL) tablet 650 mg  650 mg Oral Q6H PRN    Or    acetaminophen (TYLENOL) suppository 650 mg  650 mg Rectal Q6H PRN    lisinopril  (PRINIVIL;ZESTRIL) tablet 20 mg  20 mg Oral Daily    And    hydroCHLOROthiazide (HYDRODIURIL) tablet 25 mg  25 mg Oral Daily    nitroGLYCERIN (NITROSTAT) SL tablet 0.4 mg  0.4 mg SubLINGual Q5 Min PRN    cefTRIAXone (ROCEPHIN) 1,000 mg in sodium chloride 0.9 % 50 mL IVPB (mini-bag)  1,000 mg IntraVENous Q24H    azithromycin (ZITHROMAX) 500 mg in sodium chloride 0.9 % 250 mL IVPB ([id number])  500 mg IntraVENous Q24H         Mateo Calvin DO

## 2024-07-29 NOTE — PROGRESS NOTES
Nuc Med -  Stress/MPI completed @ 4628 today   Please check with DR regarding diet   Results pending

## 2024-07-29 NOTE — PROGRESS NOTES
End of Shift Note    Bedside shift change report given to Terrell EVANS (oncoming nurse) by Christine Caballero RN (offgoing nurse).  Report included the following information SBAR, Kardex, and MAR    Shift worked:  7am-1900pm     Shift summary and any significant changes:     Pt is on continous heparin drip. New IV placed for IV antibiotics. Pt NPO after midnight for stress test tomorrow. No any complain of pain. Troponin drawn.       Christine Caballero RN

## 2024-07-29 NOTE — PLAN OF CARE
Problem: Discharge Planning  Goal: Discharge to home or other facility with appropriate resources  7/29/2024 1404 by Catherine Wyatt RN  Outcome: Progressing  7/29/2024 0411 by Terrell Cyr RN  Outcome: Progressing  7/29/2024 0010 by Allan Bautista RN  Outcome: Progressing  Flowsheets (Taken 7/28/2024 1954 by Terrell Cyr RN)  Discharge to home or other facility with appropriate resources: Identify barriers to discharge with patient and caregiver     Problem: Safety - Adult  Goal: Free from fall injury  7/29/2024 1404 by Catherine Wyatt RN  Outcome: Progressing  7/29/2024 0411 by Terrell Cyr RN  Outcome: Progressing  7/29/2024 0010 by Allan Bautista RN  Outcome: Progressing

## 2024-07-29 NOTE — PLAN OF CARE
Problem: Discharge Planning  Goal: Discharge to home or other facility with appropriate resources  Outcome: Progressing  Flowsheets (Taken 7/28/2024 1954 by Terrell Cyr RN)  Discharge to home or other facility with appropriate resources: Identify barriers to discharge with patient and caregiver     Problem: Safety - Adult  Goal: Free from fall injury  Outcome: Progressing

## 2024-07-30 LAB
ALBUMIN SERPL-MCNC: 2.1 G/DL (ref 3.5–5)
ALBUMIN/GLOB SERPL: 0.7 (ref 1.1–2.2)
ALP SERPL-CCNC: 64 U/L (ref 45–117)
ALT SERPL-CCNC: 24 U/L (ref 12–78)
ANION GAP SERPL CALC-SCNC: 2 MMOL/L (ref 5–15)
AST SERPL-CCNC: 17 U/L (ref 15–37)
BASOPHILS # BLD: 0 K/UL (ref 0–0.1)
BASOPHILS NFR BLD: 0 % (ref 0–1)
BILIRUB SERPL-MCNC: 0.8 MG/DL (ref 0.2–1)
BUN SERPL-MCNC: 24 MG/DL (ref 6–20)
BUN/CREAT SERPL: 17 (ref 12–20)
CALCIUM SERPL-MCNC: 8.4 MG/DL (ref 8.5–10.1)
CHLORIDE SERPL-SCNC: 113 MMOL/L (ref 97–108)
CO2 SERPL-SCNC: 27 MMOL/L (ref 21–32)
CREAT SERPL-MCNC: 1.41 MG/DL (ref 0.7–1.3)
DIFFERENTIAL METHOD BLD: ABNORMAL
EOSINOPHIL # BLD: 0.2 K/UL (ref 0–0.4)
EOSINOPHIL NFR BLD: 2 % (ref 0–7)
ERYTHROCYTE [DISTWIDTH] IN BLOOD BY AUTOMATED COUNT: 15.5 % (ref 11.5–14.5)
GLOBULIN SER CALC-MCNC: 3.2 G/DL (ref 2–4)
GLUCOSE SERPL-MCNC: 102 MG/DL (ref 65–100)
HCT VFR BLD AUTO: 24.1 % (ref 36.6–50.3)
HGB BLD-MCNC: 7.7 G/DL (ref 12.1–17)
IMM GRANULOCYTES # BLD AUTO: 0 K/UL (ref 0–0.04)
IMM GRANULOCYTES NFR BLD AUTO: 1 % (ref 0–0.5)
LYMPHOCYTES # BLD: 1.2 K/UL (ref 0.8–3.5)
LYMPHOCYTES NFR BLD: 14 % (ref 12–49)
MAGNESIUM SERPL-MCNC: 2 MG/DL (ref 1.6–2.4)
MCH RBC QN AUTO: 26.7 PG (ref 26–34)
MCHC RBC AUTO-ENTMCNC: 32 G/DL (ref 30–36.5)
MCV RBC AUTO: 83.7 FL (ref 80–99)
MONOCYTES # BLD: 1.1 K/UL (ref 0–1)
MONOCYTES NFR BLD: 13 % (ref 5–13)
NEUTS SEG # BLD: 6.2 K/UL (ref 1.8–8)
NEUTS SEG NFR BLD: 70 % (ref 32–75)
NRBC # BLD: 0 K/UL (ref 0–0.01)
NRBC BLD-RTO: 0 PER 100 WBC
PHOSPHATE SERPL-MCNC: 2.7 MG/DL (ref 2.6–4.7)
PLATELET # BLD AUTO: 173 K/UL (ref 150–400)
PMV BLD AUTO: 10.3 FL (ref 8.9–12.9)
POTASSIUM SERPL-SCNC: 4.2 MMOL/L (ref 3.5–5.1)
PROT SERPL-MCNC: 5.3 G/DL (ref 6.4–8.2)
RBC # BLD AUTO: 2.88 M/UL (ref 4.1–5.7)
SODIUM SERPL-SCNC: 142 MMOL/L (ref 136–145)
WBC # BLD AUTO: 8.8 K/UL (ref 4.1–11.1)

## 2024-07-30 PROCEDURE — 1100000003 HC PRIVATE W/ TELEMETRY

## 2024-07-30 PROCEDURE — 2580000003 HC RX 258: Performed by: INTERNAL MEDICINE

## 2024-07-30 PROCEDURE — 2580000003 HC RX 258: Performed by: STUDENT IN AN ORGANIZED HEALTH CARE EDUCATION/TRAINING PROGRAM

## 2024-07-30 PROCEDURE — 85025 COMPLETE CBC W/AUTO DIFF WBC: CPT

## 2024-07-30 PROCEDURE — 83735 ASSAY OF MAGNESIUM: CPT

## 2024-07-30 PROCEDURE — 36415 COLL VENOUS BLD VENIPUNCTURE: CPT

## 2024-07-30 PROCEDURE — 80053 COMPREHEN METABOLIC PANEL: CPT

## 2024-07-30 PROCEDURE — 6370000000 HC RX 637 (ALT 250 FOR IP): Performed by: STUDENT IN AN ORGANIZED HEALTH CARE EDUCATION/TRAINING PROGRAM

## 2024-07-30 PROCEDURE — 6360000002 HC RX W HCPCS: Performed by: INTERNAL MEDICINE

## 2024-07-30 PROCEDURE — 84100 ASSAY OF PHOSPHORUS: CPT

## 2024-07-30 PROCEDURE — 99024 POSTOP FOLLOW-UP VISIT: CPT | Performed by: NURSE PRACTITIONER

## 2024-07-30 RX ADMIN — LISINOPRIL 20 MG: 20 TABLET ORAL at 08:28

## 2024-07-30 RX ADMIN — Medication 1000 UNITS: at 08:28

## 2024-07-30 RX ADMIN — HYDROCHLOROTHIAZIDE 25 MG: 25 TABLET ORAL at 08:28

## 2024-07-30 RX ADMIN — ATORVASTATIN CALCIUM 80 MG: 40 TABLET, FILM COATED ORAL at 08:28

## 2024-07-30 RX ADMIN — SODIUM CHLORIDE, PRESERVATIVE FREE 10 ML: 5 INJECTION INTRAVENOUS at 08:29

## 2024-07-30 RX ADMIN — CEFTRIAXONE SODIUM 1000 MG: 1 INJECTION, POWDER, FOR SOLUTION INTRAMUSCULAR; INTRAVENOUS at 10:38

## 2024-07-30 RX ADMIN — SODIUM CHLORIDE, PRESERVATIVE FREE 10 ML: 5 INJECTION INTRAVENOUS at 20:31

## 2024-07-30 RX ADMIN — FERROUS SULFATE TAB 325 MG (65 MG ELEMENTAL FE) 325 MG: 325 (65 FE) TAB at 08:28

## 2024-07-30 ASSESSMENT — PAIN SCALES - GENERAL
PAINLEVEL_OUTOF10: 0
PAINLEVEL_OUTOF10: 0
PAINLEVEL_OUTOF10: 3

## 2024-07-30 NOTE — PROGRESS NOTES
Bedside shift change report given to NATHAN Munoz (oncoming nurse) by NATHAN Le (offgoing nurse). Report included the following information Nurse Handoff Report, ED SBAR, Adult Overview, Intake/Output, MAR, and Recent Results.

## 2024-07-30 NOTE — CONSENT
Informed Consent for Blood Component Transfusion Note    I have discussed with the patient the rationale for blood component transfusion; its benefits in treating or preventing fatigue, organ damage, or death; and its risk which includes mild transfusion reactions, rare risk of blood borne infection, or more serious but rare reactions. I have discussed the alternatives to transfusion, including the risk and consequences of not receiving transfusion. The patient had an opportunity to ask questions and had agreed to proceed with transfusion of blood components.    Electronically signed by Tisha Wilson MD on 7/30/24 at 8:24 AM EDT

## 2024-07-30 NOTE — PROGRESS NOTES
Hospitalist Progress Note    NAME:   Alexandr Lewis   : 1940   MRN: [id number]     Date/Time: 2024 8:19 AM  Patient PCP: Valentina Hanson APRN - NP    Estimated discharge date:   Barriers: hemoglobin stability      Assessment / Plan:  Right inguinal swelling  : Patient reports increased discomfort in the right inguinal region.  He reports that he has had 3 hernia in the past and currently they want in the right inguinal region seems to be bothering him.  He would like to get it fixed if possible.  Will consult general surgery.  : Appreciate surgical input.  Patient is postop day #6 of bilateral inguinal hernia repair and incarcerated anterior abdominal wall hernia repair.  Patient noted to have large right groin hematoma secondary to aspirin and heparin.  Monitoring H&H.  Ice pack to right groin.  Scrotal support.  Holding all blood thinners.  Holding aspirin till .  I spoke with surgery LEVI Montgomery and plan is to repeat H&H in 3 to 4 days time and follow-up with Dr. Pryor outpatient.  Likely discharge tomorrow if hemoglobin is stable.    Anemia  : H&H of 7.7/24.1.  Aspirin and Lovenox on hold.  Monitor H&H.  Transfuse for hemoglobin less than 7.0 or if hemodynamically unstable.    NSTEMI  - Chest pain started earlier this evening - central chest pressure, no associated symptoms  - Troponin 32, 1 hour recheck increasing to 60  - Cardiology was consulted from the ED, recommended starting heparin gtt  - Will continue heparin gtt  - Continue aspirin   - Increase home statin to 80mg qhs   - Monitor HR overnight, consider BB   - Check TTE   - Cardiology consult ordered, appreciate assistance   : Patient is on heparin drip.  Continue with aspirin, statin. Waiting for echocardiogram, cardiology consult. Patient to remain n.p.o. until seen by cardiology.  : Troponin trending down overnight and this morning and last .  Appreciate cardiology input.  Patient got a Lexiscan  cooperative  EENT:  EOMI. Anicteric sclerae.  Resp:  CTA bilaterally, no wheezing or rales.  No accessory muscle use  CV:  Regular  rhythm,  No edema  GI:  Soft, swelling in the right inguinal region, Non tender.  +Bowel sounds  Neurologic:  Alert and oriented X 3, normal speech,   Psych:   Good insight. Not anxious nor agitated  Skin:  No rashes.  No jaundice    Reviewed most current lab test results and cultures  YES  Reviewed most current radiology test results   YES  Review and summation of old records today    NO  Reviewed patient's current orders and MAR    YES  PMH/SH reviewed - no change compared to H&P    Procedures: see electronic medical records for all procedures/Xrays and details which were not copied into this note but were reviewed prior to creation of Plan.      LABS:  I reviewed today's most current labs and imaging studies.  Pertinent labs include:  Recent Labs     07/28/24  0621 07/29/24  0142 07/30/24  0257   WBC 8.1 7.3 8.8   HGB 8.3* 8.1* 7.7*   HCT 24.7* 24.9* 24.1*    168 173     Recent Labs     07/27/24  2303 07/28/24  0226 07/28/24  0621 07/29/24  0142 07/30/24  0257     --  141 142 142   K 4.0  --  3.7 4.1 4.2   *  --  112* 112* 113*   CO2 23  --  23 25 27   GLUCOSE 159*  --  94 97 102*   BUN 38*  --  35* 29* 24*   CREATININE 1.66*  --  1.47* 1.34* 1.41*   CALCIUM 8.1*  --  8.2* 8.4* 8.4*   MG 1.9  --   --  1.8 2.0   PHOS  --   --   --  2.9 2.7   BILITOT 1.1*  --  1.0 1.0 0.8   AST 31  --  23 18 17   ALT 32  --  30 26 24   INR  --  1.1  --   --   --        Signed: Tisha Wilson MD

## 2024-07-30 NOTE — PROGRESS NOTES
Surgery NP Note    Discussed with Dr. Pryor.     From a surgery/inguinal hematoma standpoint, continue to hold ASA until 8/5.   Outpatient H+H in 3-4 days, order placed.   Follow-up with Dr. Pryor outpatient.     LEVI Weeks - NP

## 2024-07-31 VITALS
OXYGEN SATURATION: 100 % | HEIGHT: 66 IN | TEMPERATURE: 98.2 F | WEIGHT: 148.59 LBS | HEART RATE: 64 BPM | SYSTOLIC BLOOD PRESSURE: 112 MMHG | DIASTOLIC BLOOD PRESSURE: 62 MMHG | RESPIRATION RATE: 18 BRPM | BODY MASS INDEX: 23.88 KG/M2

## 2024-07-31 LAB
ANION GAP SERPL CALC-SCNC: 5 MMOL/L (ref 5–15)
BUN SERPL-MCNC: 26 MG/DL (ref 6–20)
BUN/CREAT SERPL: 19 (ref 12–20)
CALCIUM SERPL-MCNC: 8.2 MG/DL (ref 8.5–10.1)
CHLORIDE SERPL-SCNC: 109 MMOL/L (ref 97–108)
CO2 SERPL-SCNC: 25 MMOL/L (ref 21–32)
CREAT SERPL-MCNC: 1.38 MG/DL (ref 0.7–1.3)
ERYTHROCYTE [DISTWIDTH] IN BLOOD BY AUTOMATED COUNT: 15.6 % (ref 11.5–14.5)
GLUCOSE SERPL-MCNC: 106 MG/DL (ref 65–100)
HCT VFR BLD AUTO: 24.3 % (ref 36.6–50.3)
HGB BLD-MCNC: 7.8 G/DL (ref 12.1–17)
MAGNESIUM SERPL-MCNC: 1.9 MG/DL (ref 1.6–2.4)
MCH RBC QN AUTO: 26.7 PG (ref 26–34)
MCHC RBC AUTO-ENTMCNC: 32.1 G/DL (ref 30–36.5)
MCV RBC AUTO: 83.2 FL (ref 80–99)
NRBC # BLD: 0 K/UL (ref 0–0.01)
NRBC BLD-RTO: 0 PER 100 WBC
PHOSPHATE SERPL-MCNC: 2.4 MG/DL (ref 2.6–4.7)
PLATELET # BLD AUTO: 185 K/UL (ref 150–400)
PMV BLD AUTO: 10.2 FL (ref 8.9–12.9)
POTASSIUM SERPL-SCNC: 4.2 MMOL/L (ref 3.5–5.1)
RBC # BLD AUTO: 2.92 M/UL (ref 4.1–5.7)
SODIUM SERPL-SCNC: 139 MMOL/L (ref 136–145)
WBC # BLD AUTO: 9.4 K/UL (ref 4.1–11.1)

## 2024-07-31 PROCEDURE — 84100 ASSAY OF PHOSPHORUS: CPT

## 2024-07-31 PROCEDURE — 2580000003 HC RX 258: Performed by: INTERNAL MEDICINE

## 2024-07-31 PROCEDURE — 83735 ASSAY OF MAGNESIUM: CPT

## 2024-07-31 PROCEDURE — 2580000003 HC RX 258: Performed by: STUDENT IN AN ORGANIZED HEALTH CARE EDUCATION/TRAINING PROGRAM

## 2024-07-31 PROCEDURE — 36415 COLL VENOUS BLD VENIPUNCTURE: CPT

## 2024-07-31 PROCEDURE — 6370000000 HC RX 637 (ALT 250 FOR IP): Performed by: STUDENT IN AN ORGANIZED HEALTH CARE EDUCATION/TRAINING PROGRAM

## 2024-07-31 PROCEDURE — 6360000002 HC RX W HCPCS: Performed by: INTERNAL MEDICINE

## 2024-07-31 PROCEDURE — 80048 BASIC METABOLIC PNL TOTAL CA: CPT

## 2024-07-31 PROCEDURE — 85027 COMPLETE CBC AUTOMATED: CPT

## 2024-07-31 RX ORDER — ASPIRIN 81 MG/1
81 TABLET, CHEWABLE ORAL DAILY
Qty: 30 TABLET | Refills: 3 | Status: SHIPPED
Start: 2024-08-05

## 2024-07-31 RX ADMIN — Medication 1000 UNITS: at 10:09

## 2024-07-31 RX ADMIN — CEFTRIAXONE SODIUM 1000 MG: 1 INJECTION, POWDER, FOR SOLUTION INTRAMUSCULAR; INTRAVENOUS at 10:12

## 2024-07-31 RX ADMIN — SODIUM CHLORIDE, PRESERVATIVE FREE 10 ML: 5 INJECTION INTRAVENOUS at 10:09

## 2024-07-31 RX ADMIN — ATORVASTATIN CALCIUM 80 MG: 40 TABLET, FILM COATED ORAL at 10:09

## 2024-07-31 RX ADMIN — HYDROCHLOROTHIAZIDE 25 MG: 25 TABLET ORAL at 10:09

## 2024-07-31 RX ADMIN — LISINOPRIL 20 MG: 20 TABLET ORAL at 10:09

## 2024-07-31 RX ADMIN — FERROUS SULFATE TAB 325 MG (65 MG ELEMENTAL FE) 325 MG: 325 (65 FE) TAB at 10:09

## 2024-07-31 NOTE — DISCHARGE SUMMARY
Discharge Summary    Name: Alexandr Lewis  [id number]  YOB: 1940 (Age: 84 y.o.)   Date of Admission: 7/27/2024  Date of Discharge: 7/31/2024  Attending Physician: Pj Polk MD    Discharge Diagnosis:   Right inguinal Hematoma POA- POD # 6 with stable swelling with HH stability, cleared by Gen Surgery for DC off Plavix till 8/5/2024 - pt aware, Op followup in 2-3 days for H/H Check in office with Dr Pryor  Chest pain/?NSTEMI/elevated troponins- cleared s/p Lexiscan, normal Echo, s/p IV heparin  Suspected PNA- s/p abx, now off it with resolved ASD on repeat CXR  CKD3  HLD  Full code      Consultations:  IP CONSULT TO CARDIOLOGY  IP CONSULT TO GENERAL SURGERY      Brief Admission History/Reason for Admission Per Armin López MD:   \"Alexandr Lewis is a 84 y.o.  male with PMHx significant for listed PMH below who presents with the above chief complaint.   We were asked to admit for work up and evaluation of the above problems.      Patient presents with chief complaint of chest pain.  States that he was at home earlier this evening when he began to have centralized chest pain with some radiation into his neck and into his head.  States he has never had pain like this previously.  Denies any associated shortness of breath, nausea, vomiting, lightheadedness, palpitations.  Does note that he was recently hospitalized for hernia repair and states that this was generally uncomplicated.\"    Brief Hospital Course by Main Problems:   Right inguinal swelling  7/29: Patient reports increased discomfort in the right inguinal region.  He reports that he has had 3 hernia in the past and currently they want in the right inguinal region seems to be bothering him.  He would like to get it fixed if possible.  Will consult general surgery.  7/30: Appreciate surgical input.  Patient is postop day #6 of bilateral inguinal hernia repair and incarcerated anterior abdominal wall hernia         Information about where to get these medications is not yet available    Ask your nurse or doctor about these medications  aspirin 81 MG chewable tablet             DISPOSITION:    Home with Family:  x     Home with HH/PT/OT/RN:    SNF/LTC:    NAGA:    OTHER:            Code status: Full code  Recommended diet: cardiac diet  Recommended activity: activity as tolerated        Follow up with:   PCP : Valentina Hanson APRN - NP Gogia, Amit N, MD  [address] [address] [address]  [facility] [address] [phone]    Schedule an appointment as soon as possible for a visit in 2 week(s)      Valentina Hanson APRN - NP  [address] [place] [id number] [phone]    Go on 8/6/2024  at 2:40pm for your PCP hospital follow up.    [facility] [facility]  [address]  [facility] [address] [phone]  Follow up  As needed - [facility] is a mobile urgent care provider that comes to your home. You may call them if you would like to set up an appt to be seen for a follow up while waiting to be seen by your PCP.          Total time in minutes spent coordinating this discharge (includes going over instructions, follow-up, prescriptions, and preparing report for sign off to her PCP) :  35 minutes

## 2024-07-31 NOTE — CARDIO/PULMONARY
Chart reviewed: Patient is 84 y.o. male admitted with Chest pain [R07.9]  Acute coronary syndrome (HCC) [I24.9]  NSTEMI (non-ST elevated myocardial infarction) (HCC) [I21.4]    Patient cleared by cardiology, stress test negative.     Cardiac Rehab is not indicated at this time.     TIFFANIE CHAUHAN RN

## 2024-07-31 NOTE — CARE COORDINATION
Pt is clear from CM standpoint for d/c.    Pt's family will transport.    Transition of Care Plan:    RUR: 14%  Prior Level of Functioning: Independent   Disposition: Home with family   If SNF or IPR: Date FOC offered:   Date FOC received:   Accepting facility:   Date authorization started with reference number:   Date authorization received and expires:   Follow up appointments: PCP   DME needed: No DME needed   Transportation at discharge: pt's family   IM/IMM Medicare/ letter given: 7/31/24  Is patient a  and connected with VA? No   If yes, was  transfer form completed and VA notified? No  Caregiver Contact: Pt's niece   Discharge Caregiver contacted prior to discharge? Pt and pt's niece   Care Conference needed? No  Barriers to discharge: None         07/31/24 1031   Services At/After Discharge   Transition of Care Consult (CM Consult) N/A   Services At/After Discharge None    Resource Information Provided? No   Mode of Transport at Discharge Self   Confirm Follow Up Transport Self   Condition of Participation: Discharge Planning   The Plan for Transition of Care is related to the following treatment goals: Goal is to return home with family and follow up appointments   The Patient and/or Patient Representative was provided with a Choice of Provider? Patient;Patient Representative   Name of the Patient Representative who was provided with the Choice of Provider and agrees with the Discharge Plan?  Pt's tonny Haney   The Patient and/Or Patient Representative agree with the Discharge Plan? Yes   Freedom of Choice list was provided with basic dialogue that supports the patient's individualized plan of care/goals, treatment preferences, and shares the quality data associated with the providers?  Yes     Komal Hollis

## 2024-07-31 NOTE — PLAN OF CARE
Problem: Discharge Planning  Goal: Discharge to home or other facility with appropriate resources  7/31/2024 0751 by Tammy Verde RN  Outcome: Progressing  7/31/2024 0323 by Milana Rubin RN  Flowsheets (Taken 7/30/2024 0828 by Tammy Verde, RN)  Discharge to home or other facility with appropriate resources:   Identify barriers to discharge with patient and caregiver   Arrange for needed discharge resources and transportation as appropriate   Identify discharge learning needs (meds, wound care, etc)     Problem: Safety - Adult  Goal: Free from fall injury  7/31/2024 0751 by Tammy Verde RN  Outcome: Progressing  7/31/2024 0323 by Milana Rubin RN  Flowsheets (Taken 7/31/2024 0323)  Free From Fall Injury: Instruct family/caregiver on patient safety

## 2024-07-31 NOTE — PROGRESS NOTES
PCP hospital follow-up transitional care appointment has been scheduled with EZRA Hanson on 8/6/24 3640. [facility] placed [facility] information AVS for patient resource. Pending patient discharge.  Lucy Hernández, Care Management Assistant

## 2024-07-31 NOTE — PROGRESS NOTES
Call to patient. No answer. Left message for patient to call office.   End of Shift Note     Bedside shift change report given to NATHAN Munoz (oncoming nurse) by NATHAN Cortés (offgoing nurse).patient reports no pain during night shift.

## 2024-07-31 NOTE — PLAN OF CARE
Problem: Discharge Planning  Goal: Discharge to home or other facility with appropriate resources  Flowsheets (Taken 7/30/2024 6253 by Tammy Verde, RN)  Discharge to home or other facility with appropriate resources:   Identify barriers to discharge with patient and caregiver   Arrange for needed discharge resources and transportation as appropriate   Identify discharge learning needs (meds, wound care, etc)     Problem: Safety - Adult  Goal: Free from fall injury  Flowsheets (Taken 7/31/2024 0323)  Free From Fall Injury: Instruct family/caregiver on patient safety

## 2024-08-08 ENCOUNTER — OFFICE VISIT (OUTPATIENT)
Age: 84
End: 2024-08-08

## 2024-08-08 ENCOUNTER — TELEPHONE (OUTPATIENT)
Age: 84
End: 2024-08-08

## 2024-08-08 VITALS
BODY MASS INDEX: 25.97 KG/M2 | HEIGHT: 66 IN | WEIGHT: 161.6 LBS | HEART RATE: 96 BPM | RESPIRATION RATE: 18 BRPM | TEMPERATURE: 97.6 F | OXYGEN SATURATION: 99 % | SYSTOLIC BLOOD PRESSURE: 114 MMHG | DIASTOLIC BLOOD PRESSURE: 66 MMHG

## 2024-08-08 DIAGNOSIS — Z09 POSTOPERATIVE EXAMINATION: Primary | ICD-10-CM

## 2024-08-08 LAB
HCT VFR BLD AUTO: 25.9 % (ref 36.6–50.3)
HGB BLD-MCNC: 8 G/DL (ref 12.1–17)

## 2024-08-08 PROCEDURE — 99024 POSTOP FOLLOW-UP VISIT: CPT | Performed by: SURGERY

## 2024-08-08 ASSESSMENT — PATIENT HEALTH QUESTIONNAIRE - PHQ9
1. LITTLE INTEREST OR PLEASURE IN DOING THINGS: NOT AT ALL
SUM OF ALL RESPONSES TO PHQ9 QUESTIONS 1 & 2: 0
SUM OF ALL RESPONSES TO PHQ QUESTIONS 1-9: 0
2. FEELING DOWN, DEPRESSED OR HOPELESS: NOT AT ALL
SUM OF ALL RESPONSES TO PHQ QUESTIONS 1-9: 0

## 2024-08-08 NOTE — PROGRESS NOTES
Identified pt with two pt identifiers (name and ). Reviewed chart in preparation for visit and have obtained necessary documentation.    Alexandr Lewis is a 84 y.o. male Post-Op Check (S/p 1. Laparoscopic repair of bilateral inguinal hernia with mesh, robot assisted 2. Incarcerated anterior abdominal hernia repair 4 CM on 24)  .    Vitals:    24 1023 24 1034   BP: (!) 150/72 114/66   Site: Left Upper Arm Right Upper Arm   Position: Sitting Sitting   Pulse: 96    Resp: 18    Temp: 97.6 °F (36.4 °C)    TempSrc: Oral    SpO2: 99%    Weight: 73.3 kg (161 lb 9.6 oz)    Height: 1.676 m (5' 5.98\")           1. Have you been to the ER, urgent care clinic since your last visit?  Hospitalized since your last visit?  no     2. Have you seen or consulted any other health care providers outside of the [facility] since your last visit?  Include any pap smears or colon screening.  no

## 2024-08-08 NOTE — TELEPHONE ENCOUNTER
Please let him know that his repeat hemoglobin today is 8  This is increased from his hemoglobin on discharge of 7.8.    It should slowly improving over time.  Keep follow-up with PCP    Can see us as needed.

## 2024-08-08 NOTE — PROGRESS NOTES
Chief Complaint   Patient presents with    Post-Op Check     S/p 1. Laparoscopic repair of bilateral inguinal hernia with mesh, robot assisted 2. Incarcerated anterior abdominal hernia repair 4 CM on 7/23/24       Tolerating PO  Had a hamburger steak with fried onions last night    Went to the hospital with chest pain postop  I was not informed of the admission and he was given blood thinners due to the rule out cardiac event  This caused a large hematoma to develop in his right groin with anemia.      We asked him to get his hemoglobin checked 3 days after discharge but he never went.    Some occasional epigastric discomfort with belching    BMs: getting better    Pain controlled without pain meds      Physical Exam:   Abdominal exam: Soft, non-distended, non-tender.    Wound: clean, dry, no drainage.  Right groin hematoma stable.    Doing Well  The hematoma should resolve over the next few months    Never got his Hgb checked. Will send him now.    Continue restricted activity for a total of 4 weeks    Will follow-up on hemoglobin      Case Pryor MD FACS

## 2024-08-09 NOTE — TELEPHONE ENCOUNTER
Tried to call patient and let him know his repeat hemoglobin today is 8  This is increased from his hemoglobin on discharge of 7.8.  It should slowly improving over time.  Dr Pryor wants him to keep follow-up with his PCP and can see us as needed.     Unable to leave a voice mail

## 2024-08-12 NOTE — PROGRESS NOTES
Physician Progress Note      PATIENT:               CHAGO SALDIVAR  CSN #:                  [id number]  :                       1940  ADMIT DATE:       2024 10:04 PM  DISCH DATE:        2024 2:00 PM  RESPONDING  PROVIDER #:        Pj Polk MD          QUERY TEXT:    Pt admitted with Chest Pain.  If possible, please document in progress notes   and discharge summary if you are evaluating and/or treating any of the   following:    The medical record reflects the following:  Risk Factors: CKD, HLD, HTN    Clinical Indicators:  H&P-  Chest pain  Uptrending troponin  - Chest pain started earlier this evening - central chest pressure, no   associated symptoms  - Troponin 32, 1 hour recheck increasing to 60  - Cardiology was consulted from the ED, recommended starting heparin gtt  - Will continue heparin gtt  - Continue aspirin  - Increase home statin to 80mg qhs  - Monitor HR overnight, consider BB  - Check TTE  - Cardiology consult ordered, appreciate assistance    Cards Consult -  Non-ST elevation myocardial infarction  Minimally elevated troponin  No further chest pain on heparin drip  On statin and aspirin now    Cards -  Indeterminate troponin elevation  NPO for MPI today.  If negative can be d/c'ed.  If abnormal will need to be   NPO p MN for cath tomorrow.  Echo pending  Continue ASA  Continue statin  Continue ACEi  No BB with resting bradycardia    DC Summary -  Right inguinal Hematoma POA- POD # 6 with stable swelling with HH stability,   cleared by Gen Surgery for DC off Plavix till 2024 - pt aware, Op followup   in 2-3 days for H/H Check in office with Dr Pryor  Chest pain/?NSTEMI/elevated troponins- cleared s/p Lexiscan, normal Echo, s/p   IV heparin  Suspected PNA- s/p abx, now off it with resolved ASD on repeat CXR  CKD3  HLD    NSTEMI  - Chest pain started earlier this evening - central chest pressure, no   associated symptoms  - Troponin 32, 1 hour recheck increasing to

## 2024-09-05 ENCOUNTER — TRANSCRIBE ORDERS (OUTPATIENT)
Facility: HOSPITAL | Age: 84
End: 2024-09-05

## 2024-09-05 DIAGNOSIS — D72.829 LEUKOCYTOSIS, UNSPECIFIED TYPE: ICD-10-CM

## 2024-09-05 DIAGNOSIS — D47.2 MONOCLONAL PARAPROTEINEMIA: Primary | ICD-10-CM

## 2024-10-08 ENCOUNTER — HOSPITAL ENCOUNTER (OUTPATIENT)
Facility: HOSPITAL | Age: 84
Discharge: HOME | End: 2024-10-11
Attending: INTERNAL MEDICINE
Payer: MEDICARE

## 2024-10-08 DIAGNOSIS — D47.2 MONOCLONAL PARAPROTEINEMIA: ICD-10-CM

## 2024-10-08 DIAGNOSIS — D72.829 LEUKOCYTOSIS, UNSPECIFIED TYPE: ICD-10-CM

## 2024-10-08 PROCEDURE — 77075 RADEX OSSEOUS SURVEY COMPL: CPT

## 2025-02-05 ENCOUNTER — APPOINTMENT (OUTPATIENT)
Facility: HOSPITAL | Age: 85
DRG: 389 | End: 2025-02-05
Payer: MEDICARE

## 2025-02-05 ENCOUNTER — HOSPITAL ENCOUNTER (INPATIENT)
Facility: HOSPITAL | Age: 85
LOS: 2 days | Discharge: HOME | DRG: 389 | End: 2025-02-07
Admitting: STUDENT IN AN ORGANIZED HEALTH CARE EDUCATION/TRAINING PROGRAM
Payer: MEDICARE

## 2025-02-05 LAB
ALBUMIN SERPL-MCNC: 3.4 G/DL (ref 3.5–5)
ALBUMIN/GLOB SERPL: 1 (ref 1.1–2.2)
ALP SERPL-CCNC: 97 U/L (ref 45–117)
ALT SERPL-CCNC: 41 U/L (ref 12–78)
ANION GAP SERPL CALC-SCNC: 7 MMOL/L (ref 2–12)
AST SERPL-CCNC: 42 U/L (ref 15–37)
BASOPHILS # BLD: 0.03 K/UL (ref 0–0.1)
BASOPHILS NFR BLD: 0.3 % (ref 0–1)
BILIRUB SERPL-MCNC: 0.9 MG/DL (ref 0.2–1)
BUN SERPL-MCNC: 48 MG/DL (ref 6–20)
BUN/CREAT SERPL: 21 (ref 12–20)
CALCIUM SERPL-MCNC: 9.4 MG/DL (ref 8.5–10.1)
CHLORIDE SERPL-SCNC: 111 MMOL/L (ref 97–108)
CO2 SERPL-SCNC: 22 MMOL/L (ref 21–32)
CREAT SERPL-MCNC: 2.31 MG/DL (ref 0.7–1.3)
DIFFERENTIAL METHOD BLD: ABNORMAL
EOSINOPHIL # BLD: 0.04 K/UL (ref 0–0.4)
EOSINOPHIL NFR BLD: 0.5 % (ref 0–7)
ERYTHROCYTE [DISTWIDTH] IN BLOOD BY AUTOMATED COUNT: 14.2 % (ref 11.5–14.5)
GLOBULIN SER CALC-MCNC: 3.4 G/DL (ref 2–4)
GLUCOSE SERPL-MCNC: 109 MG/DL (ref 65–100)
HCT VFR BLD AUTO: 34.7 % (ref 36.6–50.3)
HGB BLD-MCNC: 11.5 G/DL (ref 12.1–17)
IMM GRANULOCYTES # BLD AUTO: 0.03 K/UL (ref 0–0.04)
IMM GRANULOCYTES NFR BLD AUTO: 0.3 % (ref 0–0.5)
LIPASE SERPL-CCNC: 129 U/L (ref 13–75)
LYMPHOCYTES # BLD: 1.72 K/UL (ref 0.8–3.5)
LYMPHOCYTES NFR BLD: 19.5 % (ref 12–49)
MCH RBC QN AUTO: 26.9 PG (ref 26–34)
MCHC RBC AUTO-ENTMCNC: 33.1 G/DL (ref 30–36.5)
MCV RBC AUTO: 81.1 FL (ref 80–99)
MONOCYTES # BLD: 0.89 K/UL (ref 0–1)
MONOCYTES NFR BLD: 10.1 % (ref 5–13)
NEUTS SEG # BLD: 6.11 K/UL (ref 1.8–8)
NEUTS SEG NFR BLD: 69.3 % (ref 32–75)
NRBC # BLD: 0 K/UL (ref 0–0.01)
NRBC BLD-RTO: 0 PER 100 WBC
PLATELET # BLD AUTO: 220 K/UL (ref 150–400)
PMV BLD AUTO: 9.9 FL (ref 8.9–12.9)
POTASSIUM SERPL-SCNC: 3.9 MMOL/L (ref 3.5–5.1)
PROT SERPL-MCNC: 6.8 G/DL (ref 6.4–8.2)
RBC # BLD AUTO: 4.28 M/UL (ref 4.1–5.7)
SODIUM SERPL-SCNC: 140 MMOL/L (ref 136–145)
TROPONIN I SERPL HS-MCNC: 68 NG/L (ref 0–76)
TROPONIN I SERPL HS-MCNC: 82 NG/L (ref 0–76)
WBC # BLD AUTO: 8.8 K/UL (ref 4.1–11.1)

## 2025-02-05 PROCEDURE — 36415 COLL VENOUS BLD VENIPUNCTURE: CPT

## 2025-02-05 PROCEDURE — 93005 ELECTROCARDIOGRAM TRACING: CPT

## 2025-02-05 PROCEDURE — 83690 ASSAY OF LIPASE: CPT

## 2025-02-05 PROCEDURE — 1100000000 HC RM PRIVATE

## 2025-02-05 PROCEDURE — 85025 COMPLETE CBC W/AUTO DIFF WBC: CPT

## 2025-02-05 PROCEDURE — 80053 COMPREHEN METABOLIC PANEL: CPT

## 2025-02-05 PROCEDURE — 0D9670Z DRAINAGE OF STOMACH WITH DRAINAGE DEVICE, VIA NATURAL OR ARTIFICIAL OPENING: ICD-10-PCS | Performed by: INTERNAL MEDICINE

## 2025-02-05 PROCEDURE — 2580000003 HC RX 258

## 2025-02-05 PROCEDURE — 99285 EMERGENCY DEPT VISIT HI MDM: CPT

## 2025-02-05 PROCEDURE — 74018 RADEX ABDOMEN 1 VIEW: CPT

## 2025-02-05 PROCEDURE — 74176 CT ABD & PELVIS W/O CONTRAST: CPT

## 2025-02-05 PROCEDURE — 84484 ASSAY OF TROPONIN QUANT: CPT

## 2025-02-05 RX ORDER — ONDANSETRON 2 MG/ML
4 INJECTION INTRAMUSCULAR; INTRAVENOUS EVERY 6 HOURS PRN
Status: DISCONTINUED | OUTPATIENT
Start: 2025-02-05 | End: 2025-02-07 | Stop reason: HOSPADM

## 2025-02-05 RX ORDER — ACETAMINOPHEN 325 MG/1
650 TABLET ORAL EVERY 6 HOURS PRN
Status: DISCONTINUED | OUTPATIENT
Start: 2025-02-05 | End: 2025-02-07 | Stop reason: HOSPADM

## 2025-02-05 RX ORDER — ACETAMINOPHEN 650 MG/1
650 SUPPOSITORY RECTAL EVERY 6 HOURS PRN
Status: DISCONTINUED | OUTPATIENT
Start: 2025-02-05 | End: 2025-02-07 | Stop reason: HOSPADM

## 2025-02-05 RX ORDER — SODIUM CHLORIDE 9 MG/ML
INJECTION, SOLUTION INTRAVENOUS PRN
Status: DISCONTINUED | OUTPATIENT
Start: 2025-02-05 | End: 2025-02-07 | Stop reason: HOSPADM

## 2025-02-05 RX ORDER — 0.9 % SODIUM CHLORIDE 0.9 %
1000 INTRAVENOUS SOLUTION INTRAVENOUS ONCE
Status: COMPLETED | OUTPATIENT
Start: 2025-02-05 | End: 2025-02-05

## 2025-02-05 RX ORDER — HEPARIN SODIUM 5000 [USP'U]/ML
5000 INJECTION, SOLUTION INTRAVENOUS; SUBCUTANEOUS EVERY 8 HOURS SCHEDULED
Status: DISCONTINUED | OUTPATIENT
Start: 2025-02-05 | End: 2025-02-07 | Stop reason: HOSPADM

## 2025-02-05 RX ORDER — SODIUM CHLORIDE 0.9 % (FLUSH) 0.9 %
5-40 SYRINGE (ML) INJECTION EVERY 12 HOURS SCHEDULED
Status: DISCONTINUED | OUTPATIENT
Start: 2025-02-05 | End: 2025-02-07 | Stop reason: HOSPADM

## 2025-02-05 RX ORDER — SODIUM CHLORIDE 0.9 % (FLUSH) 0.9 %
5-40 SYRINGE (ML) INJECTION PRN
Status: DISCONTINUED | OUTPATIENT
Start: 2025-02-05 | End: 2025-02-07 | Stop reason: HOSPADM

## 2025-02-05 RX ORDER — SODIUM CHLORIDE 9 MG/ML
INJECTION, SOLUTION INTRAVENOUS CONTINUOUS
Status: ACTIVE | OUTPATIENT
Start: 2025-02-05 | End: 2025-02-06

## 2025-02-05 RX ORDER — ONDANSETRON 4 MG/1
4 TABLET, ORALLY DISINTEGRATING ORAL EVERY 8 HOURS PRN
Status: DISCONTINUED | OUTPATIENT
Start: 2025-02-05 | End: 2025-02-07 | Stop reason: HOSPADM

## 2025-02-05 RX ADMIN — SODIUM CHLORIDE 1000 ML: 9 INJECTION, SOLUTION INTRAVENOUS at 19:43

## 2025-02-05 ASSESSMENT — PAIN DESCRIPTION - DESCRIPTORS: DESCRIPTORS: SHARP

## 2025-02-05 ASSESSMENT — PAIN DESCRIPTION - LOCATION: LOCATION: ABDOMEN

## 2025-02-05 ASSESSMENT — LIFESTYLE VARIABLES
HOW MANY STANDARD DRINKS CONTAINING ALCOHOL DO YOU HAVE ON A TYPICAL DAY: PATIENT DOES NOT DRINK
HOW OFTEN DO YOU HAVE A DRINK CONTAINING ALCOHOL: NEVER

## 2025-02-05 ASSESSMENT — PAIN DESCRIPTION - ORIENTATION: ORIENTATION: MID

## 2025-02-05 ASSESSMENT — PAIN SCALES - GENERAL: PAINLEVEL_OUTOF10: 8

## 2025-02-06 LAB
ALBUMIN SERPL-MCNC: 2.9 G/DL (ref 3.5–5)
ALBUMIN/GLOB SERPL: 0.9 (ref 1.1–2.2)
ALP SERPL-CCNC: 93 U/L (ref 45–117)
ALT SERPL-CCNC: 36 U/L (ref 12–78)
ANION GAP SERPL CALC-SCNC: 7 MMOL/L (ref 2–12)
AST SERPL-CCNC: 32 U/L (ref 15–37)
BASOPHILS # BLD: 0.03 K/UL (ref 0–0.1)
BASOPHILS NFR BLD: 0.3 % (ref 0–1)
BILIRUB SERPL-MCNC: 1.1 MG/DL (ref 0.2–1)
BUN SERPL-MCNC: 44 MG/DL (ref 6–20)
BUN/CREAT SERPL: 22 (ref 12–20)
CALCIUM SERPL-MCNC: 8.8 MG/DL (ref 8.5–10.1)
CHLORIDE SERPL-SCNC: 115 MMOL/L (ref 97–108)
CO2 SERPL-SCNC: 22 MMOL/L (ref 21–32)
CREAT SERPL-MCNC: 1.99 MG/DL (ref 0.7–1.3)
DIFFERENTIAL METHOD BLD: ABNORMAL
EKG ATRIAL RATE: 59 BPM
EKG ATRIAL RATE: 62 BPM
EKG DIAGNOSIS: NORMAL
EKG DIAGNOSIS: NORMAL
EKG P AXIS: 54 DEGREES
EKG P AXIS: 65 DEGREES
EKG P-R INTERVAL: 154 MS
EKG P-R INTERVAL: 162 MS
EKG Q-T INTERVAL: 430 MS
EKG Q-T INTERVAL: 490 MS
EKG QRS DURATION: 88 MS
EKG QRS DURATION: 94 MS
EKG QTC CALCULATION (BAZETT): 436 MS
EKG QTC CALCULATION (BAZETT): 485 MS
EKG R AXIS: 33 DEGREES
EKG R AXIS: 59 DEGREES
EKG T AXIS: 111 DEGREES
EKG T AXIS: 88 DEGREES
EKG VENTRICULAR RATE: 59 BPM
EKG VENTRICULAR RATE: 62 BPM
EOSINOPHIL # BLD: 0.05 K/UL (ref 0–0.4)
EOSINOPHIL NFR BLD: 0.5 % (ref 0–7)
ERYTHROCYTE [DISTWIDTH] IN BLOOD BY AUTOMATED COUNT: 13.9 % (ref 11.5–14.5)
GLOBULIN SER CALC-MCNC: 3.3 G/DL (ref 2–4)
GLUCOSE SERPL-MCNC: 81 MG/DL (ref 65–100)
HCT VFR BLD AUTO: 31.9 % (ref 36.6–50.3)
HGB BLD-MCNC: 10.6 G/DL (ref 12.1–17)
IMM GRANULOCYTES # BLD AUTO: 0.05 K/UL (ref 0–0.04)
IMM GRANULOCYTES NFR BLD AUTO: 0.5 % (ref 0–0.5)
LYMPHOCYTES # BLD: 2.04 K/UL (ref 0.8–3.5)
LYMPHOCYTES NFR BLD: 18.9 % (ref 12–49)
MCH RBC QN AUTO: 26.8 PG (ref 26–34)
MCHC RBC AUTO-ENTMCNC: 33.2 G/DL (ref 30–36.5)
MCV RBC AUTO: 80.6 FL (ref 80–99)
MONOCYTES # BLD: 0.98 K/UL (ref 0–1)
MONOCYTES NFR BLD: 9.1 % (ref 5–13)
NEUTS SEG # BLD: 7.62 K/UL (ref 1.8–8)
NEUTS SEG NFR BLD: 70.7 % (ref 32–75)
NRBC # BLD: 0 K/UL (ref 0–0.01)
NRBC BLD-RTO: 0 PER 100 WBC
PLATELET # BLD AUTO: 203 K/UL (ref 150–400)
PMV BLD AUTO: 10.4 FL (ref 8.9–12.9)
POTASSIUM SERPL-SCNC: 3.6 MMOL/L (ref 3.5–5.1)
PROT SERPL-MCNC: 6.2 G/DL (ref 6.4–8.2)
RBC # BLD AUTO: 3.96 M/UL (ref 4.1–5.7)
SODIUM SERPL-SCNC: 144 MMOL/L (ref 136–145)
WBC # BLD AUTO: 10.8 K/UL (ref 4.1–11.1)

## 2025-02-06 PROCEDURE — 1100000003 HC PRIVATE W/ TELEMETRY

## 2025-02-06 PROCEDURE — 85025 COMPLETE CBC W/AUTO DIFF WBC: CPT

## 2025-02-06 PROCEDURE — APPNB30 APP NON BILLABLE TIME 0-30 MINS: Performed by: NURSE PRACTITIONER

## 2025-02-06 PROCEDURE — 80053 COMPREHEN METABOLIC PANEL: CPT

## 2025-02-06 PROCEDURE — 2580000003 HC RX 258: Performed by: STUDENT IN AN ORGANIZED HEALTH CARE EDUCATION/TRAINING PROGRAM

## 2025-02-06 PROCEDURE — 6360000004 HC RX CONTRAST MEDICATION: Performed by: NURSE PRACTITIONER

## 2025-02-06 PROCEDURE — 2500000003 HC RX 250 WO HCPCS: Performed by: STUDENT IN AN ORGANIZED HEALTH CARE EDUCATION/TRAINING PROGRAM

## 2025-02-06 PROCEDURE — 36415 COLL VENOUS BLD VENIPUNCTURE: CPT

## 2025-02-06 PROCEDURE — 99221 1ST HOSP IP/OBS SF/LOW 40: CPT | Performed by: SURGERY

## 2025-02-06 PROCEDURE — 6360000002 HC RX W HCPCS: Performed by: STUDENT IN AN ORGANIZED HEALTH CARE EDUCATION/TRAINING PROGRAM

## 2025-02-06 RX ORDER — DIATRIZOATE MEGLUMINE AND DIATRIZOATE SODIUM 660; 100 MG/ML; MG/ML
80 SOLUTION ORAL; RECTAL
Status: COMPLETED | OUTPATIENT
Start: 2025-02-06 | End: 2025-02-06

## 2025-02-06 RX ADMIN — HEPARIN SODIUM 5000 UNITS: 5000 INJECTION INTRAVENOUS; SUBCUTANEOUS at 14:26

## 2025-02-06 RX ADMIN — HEPARIN SODIUM 5000 UNITS: 5000 INJECTION INTRAVENOUS; SUBCUTANEOUS at 21:39

## 2025-02-06 RX ADMIN — HEPARIN SODIUM 5000 UNITS: 5000 INJECTION INTRAVENOUS; SUBCUTANEOUS at 06:36

## 2025-02-06 RX ADMIN — SODIUM CHLORIDE, PRESERVATIVE FREE 10 ML: 5 INJECTION INTRAVENOUS at 10:23

## 2025-02-06 RX ADMIN — SODIUM CHLORIDE: 9 INJECTION, SOLUTION INTRAVENOUS at 02:28

## 2025-02-06 RX ADMIN — DIATRIZOATE MEGLUMINE AND DIATRIZOATE SODIUM 80 ML: 660; 100 LIQUID ORAL; RECTAL at 10:22

## 2025-02-06 RX ADMIN — SODIUM CHLORIDE, PRESERVATIVE FREE 10 ML: 5 INJECTION INTRAVENOUS at 21:39

## 2025-02-06 RX ADMIN — SODIUM CHLORIDE, PRESERVATIVE FREE 10 ML: 5 INJECTION INTRAVENOUS at 02:29

## 2025-02-06 RX ADMIN — HEPARIN SODIUM 5000 UNITS: 5000 INJECTION INTRAVENOUS; SUBCUTANEOUS at 00:11

## 2025-02-06 ASSESSMENT — PAIN SCALES - GENERAL
PAINLEVEL_OUTOF10: 0
PAINLEVEL_OUTOF10: 0

## 2025-02-06 NOTE — PROGRESS NOTES
Hospitalist Progress Note    NAME:   Alexandr Lewis   : 1940   MRN: [id number]     Date/Time: 2025 6:50 PM  Patient PCP: Valentina Hanson APRN - NP    Estimated discharge date:  Barriers:       Assessment / Plan:  Alexandr Lewis is a 84 y.o.  male with PMHx significant for SBO, inguinal hernia status post mesh repair , CKD stage III, hypertension, hyperlipidemia presents with complaints of intractable nausea vomiting with abdominal pain     SBO versus ileus  Gastrografin test tomorrow,  Start on clear liquid     CASIMIRO on CKD  -Improving  -Presented creatinine 2.31, baseline around 1.4, likely prerenal, placed on maintenance IV fluids, trend BMP, avoid nephrotoxic meds, hold home lisinopril/hydrochlorothiazide     Elevated troponins  -Likely secondary to demand ischemia in setting of dehydration, CASIMIRO.  Trend, monitor on telemetry     Hypertension  Hyperlipidemia  -Can resume home meds when tolerating p.o., as needed labetalol for SBP greater than 180                     Medical Decision Making:   I personally reviewed labs:  I personally reviewed imaging:  I personally reviewed EKG:  Toxic drug monitoring:   Discussed case with:         Code Status:   DVT Prophylaxis:   GI Prophylaxis:    Subjective:     Chief Complaint / Reason for Physician Visit  Discussed with RN events overnight.       Objective:     VITALS:   Last 24hrs VS reviewed since prior progress note. Most recent are:  Patient Vitals for the past 24 hrs:   BP Temp Temp src Pulse Resp SpO2   25 1510 128/68 99 °F (37.2 °C) Oral 55 18 100 %   25 1114 135/64 98.6 °F (37 °C) Oral 69 18 99 %   25 0845 137/71 98.1 °F (36.7 °C) Oral 58 18 100 %   25 0220 (!) 126/57 98.1 °F (36.7 °C) Oral 59 18 99 %   25 2315 139/78 -- -- 66 18 98 %   25 2130 125/77 -- -- 66 15 97 %   25 120/67 -- -- 83 21 97 %         Intake/Output Summary (Last 24 hours) at 2025 1850  Last data filed at 2025 2232  Gross per

## 2025-02-06 NOTE — ED NOTES
Patient cleaned, pants taken off and placed at bedside with jacket, hat, shoes and keys.     Patient placed in clean depends.

## 2025-02-06 NOTE — PLAN OF CARE
Problem: Pain  Goal: Verbalizes/displays adequate comfort level or baseline comfort level  2/6/2025 1224 by Thea Nicholas RN  Outcome: Progressing  2/6/2025 0244 by Agnes Wilson RN  Outcome: Progressing     Problem: Safety - Adult  Goal: Free from fall injury  2/6/2025 1224 by Thea Nicholas RN  Outcome: Progressing  2/6/2025 0244 by Agnes Wilson RN  Outcome: Progressing

## 2025-02-06 NOTE — PROGRESS NOTES
Spiritual Health History and Assessment/Progress Note  [facility]    Attempted Encounter,  ,  ,      Name: Alexandr Lewis MRN: [id number]    Age: 84 y.o.     Sex: male   Language: English   Orthodox: Moravian   SBO (small bowel obstruction) (Formerly [facility])     Date: 2/6/2025            Total Time Calculated: 1 min              Spiritual Assessment began in [facility]        Referral/Consult From: Rounding   Encounter Overview/Reason: Attempted Encounter  Service Provided For: Patient not available    Faiza, Belief, Meaning:   Patient unable to assess at this time  Family/Friends No family/friends present      Importance and Influence:  Patient unable to assess at this time  Family/Friends No family/friends present    Community:  Patient Other: Unable to assess  Family/Friends No family/friends present    Assessment and Plan of Care:     Patient Interventions include: Other: Unable to assess  Family/Friends Interventions include: No family/friends present    Patient Plan of Care: Spiritual Care available upon further referral  Family/Friends Plan of Care: Spiritual Care available upon further referral    Electronically signed by ELIZABETH Amezcua on 2/6/2025 at 11:03 AM     Rev. MARTHA Amezcua BCC  [facility]   Paging Service [phone])

## 2025-02-06 NOTE — H&P
Hospitalist Admission Note    NAME:   Alexandr eLwis   : 1940   MRN: [id number]     Date/Time: 2025 3:19 AM    Patient PCP: Valentina Hanson APRN - NP    ______________________________________________________________________  Given the patient's current clinical presentation, I have a high level of concern for decompensation if discharged from the emergency department.  Complex decision making was performed, which includes reviewing the patient's available past medical records, laboratory results, and x-ray films.       My assessment of this patient's clinical condition and my plan of care is as follows.    Assessment / Plan:    Alexandr Lewis is a 84 y.o.  male with PMHx significant for SBO, inguinal hernia status post mesh repair , CKD stage III, hypertension, hyperlipidemia presents with complaints of intractable nausea vomiting with abdominal pain    SBO versus ileus  -Abdominal pain, nausea, vomiting, history of SBO.  CT abdomen pelvis showing dilated ileal loops concerning for SBO versus ileus  -ED provider discussed with general surgery, recommend conservative management, will consult general surgery, follow-up further recommendations.  NG tube placed in the ED, ordered to low intermittent wall suction, keep n.p.o., placed on maintenance IV fluids while n.p.o.    CASIMIRO on CKD  -Creatinine 2.31, baseline around 1.4, likely prerenal, placed on maintenance IV fluids, trend BMP, avoid nephrotoxic meds, hold home lisinopril/hydrochlorothiazide    Elevated troponins  -Likely secondary to demand ischemia in setting of dehydration, CASIMIRO.  Trend, monitor on telemetry    Hypertension  Hyperlipidemia  -Can resume home meds when tolerating p.o., as needed labetalol for SBP greater than 180                  Medical Decision Making:   I personally reviewed labs: CBC, BMP, troponins  I personally reviewed imaging: CT abdomen pelvis  Toxic drug monitoring:-  Discussed case with: ED provider. After discussion I  unchanged and benign. STOMACH: Moderate distention with food products, fluid, and gas. SMALL BOWEL: Ileal loops are dilated up to 4 cm. Multifocal mural thickening of the ileum. Jejunum is nondistended. COLON: No obstruction. Contain stool and fluid. APPENDIX: Normal. PERITONEUM: No ascites or pneumoperitoneum. RETROPERITONEUM: No lymphadenopathy or aortic aneurysm. REPRODUCTIVE ORGANS: URINARY BLADDER: No mass or calculus. BONES: No suspicious bone lesion. Multiple bone islands are unchanged. ABDOMINAL WALL: Bilateral inguinal hernia repair. No recurrent inguinal hernia. Small fat-containing umbilical hernia previously containing nonobstructing bowel. ADDITIONAL COMMENTS: Muscle atrophy and arterial atherosclerosis.     Dilated ileal loops due to obstruction and adhesion versus ileus in the setting of nonspecific enteritis. Gastric distention. Consider enteric tube placement. Electronically signed by Vamshi Pantoja     _______________________________________________________________________    TOTAL TIME:  76 Minutes    Critical Care Provided     Minutes non procedure based    Signed: Francisca Howard MD    Procedures: see electronic medical records for all procedures/Xrays and details which were not copied into this note but were reviewed prior to creation of Plan.

## 2025-02-06 NOTE — PROGRESS NOTES
Gastrografin Challenge in Small Bowel Obstructions    Purpose:   Gastrografin (diatrizoate meglumine-sodium) is a contrast media which is used in small bowel obstructions.     It does two things for the patient:  First, it DIAGNOSTIC, meaning it provides visibility on X-Ray which helps determine the extent or improvement of the obstruction.    Second, it is THERAPUETIC, meaning it helps to promote bowel function as an osmotic laxative, thereby relieving the obstruction.       Process:  Surgeon will order the Gastrografin. This is to be treated like any other medication and ADMINISTERED AS ORDERED regardless of imaging/X-ray orders.   The contrast should be obtained from the [facility] or PHARMACY. Do not call radiology for it. Remember, treat this as a medication, not just contrast.   Mix the medication as noted in the instructions and give to the patient either PO or via NG Tube (if they have one).   If the patient has an NGT, clamp it for 4 hours following administration and then return to suction after four hours.   Xrays will be ordered by the surgeon. These may be 4 hours after the Gastrografin or the next day depending on various patient factors. DO NOT WAIT FOR AN XRAY ORDER TO GIVE THE GASTROGRAFIN. Often times the Xray order will not be placed at the same time as the Gastrografin and this is intentional.

## 2025-02-06 NOTE — PROGRESS NOTES
End of Shift Note    Bedside shift change report given to Kamini EVANS (oncoming nurse) by Agnes Wilson RN (offgoing nurse).  Report included the following information SBAR, Kardex, and MAR    Shift worked:  night     Shift summary and any significant changes:     Patient voiding to bathroom ,had bowel movement x 2 ,denies pain ,no gastric content on the suction      Concerns for physician to address:  none     Zone phone for oncoming shift:   [phone]       Activity:  Level of Assistance: Minimal assist, patient does 75% or more  Number times ambulated in hallways past shift: 0  Number of times OOB to chair past shift: 0    Cardiac:   Cardiac Monitoring: Yes           Access:  Current line(s): PIV     Genitourinary:        Respiratory:   O2 Device: None (Room air)  Chronic home O2 use?: NO  Incentive spirometer at bedside: YES    GI:     Current diet:  Diet NPO  Passing flatus: YES    Pain Management:   Patient states pain is manageable on current regimen: YES    Skin:  Rudy Scale Score: 20  Interventions: Wound Offloading (Prevention Methods): Bed, pressure reduction mattress, Elevate heels, Pillows, Repositioning    Patient Safety:  Fall Risk: Nursing Judgement-Fall Risk High(Add Comments): Yes  Fall Risk Interventions  Nursing Judgement-Fall Risk High(Add Comments): Yes  Toilet Every 2 Hours-In Advance of Need: Yes  Hourly Visual Checks: In bed, Awake  Fall Visual Posted: Socks, Fall sign posted  Room Door Open: Deferred to promote rest  Alarm On: Bed    Active Consults:   IP CONSULT TO GENERAL SURGERY    Length of Stay:  Expected LOS: 3  Actual LOS: 1    Agnes Wilson RN

## 2025-02-06 NOTE — CONSULTS
Subjective:      Alexandr Lewis is a 84 y.o. male who is for evaluation of abdominal pain.  The pain is located in the epigastric.  Pain is described as aching, and measures 5/10 in intensity.  Onset of pain was 1 day ago. Aggravating factors include eating. Alleviating factors include none. Associated symptoms include nausea and vomiting.   He states he is passing flatus and had two loose Bms overnight.      Patient has a history of a BASSEM for a SBO and bilateral IHR    Past Medical History:   Diagnosis Date    Arthritis     Cancer (HCC)     PROSTATE    Hx SBO     required surgery for lysis of adhesions    Hypertension     Inguinal hernia bilateral, non-recurrent     Kidney disease stage 3     sees Dr Enrrique Polk    Other ill-defined conditions(799.89)     elevated cholesterol    Ventral hernia      Past Surgical History:   Procedure Laterality Date    ABDOMINAL EXPLORATION SURGERY      lysis of adhesions from prostatectomy    COLONOSCOPY,DIAGNOSTIC  2012         INGUINAL HERNIA REPAIR Bilateral 2024    1. Laparoscopic repair of bilateral inguinal hernia with mesh, robot assisted 2. Incarcerated anterior abdominal hernia repair 4 CM performed by Case [facility]ryor MD at [facility] MAIN OR    ORTHOPEDIC SURGERY      L SHOULDER-UNKNOWN PROCEDURE    PROSTATE BIOPSY, NEEDLE, SATURATION SAMPLING      PROSTATECTOMY       Family History   Problem Relation Age of Onset    Heart Disease Mother     Heart Disease Father     Heart Disease Brother     Heart Disease Other      Social History     Socioeconomic History    Marital status:    Tobacco Use    Smoking status: Former     Types: Cigarettes     Start date:      Quit date:      Years since quittin.1    Smokeless tobacco: Former     Types: Chew     Quit date:    Vaping Use    Vaping status: Never Used   Substance and Sexual Activity    Alcohol use: Not Currently    Drug use: No     Social Determinants of Health     Food  Insecurity: No Food Insecurity (2/6/2025)    Hunger Vital Sign     Worried About Running Out of Food in the Last Year: Never true     Ran Out of Food in the Last Year: Never true   Transportation Needs: No Transportation Needs (2/6/2025)    PRAPARE - Transportation     Lack of Transportation (Medical): No     Lack of Transportation (Non-Medical): No   Housing Stability: Low Risk  (2/6/2025)    Housing Stability Vital Sign     Unable to Pay for Housing in the Last Year: No     Number of Times Moved in the Last Year: 0     Homeless in the Last Year: No     No current facility-administered medications on file prior to encounter.     Current Outpatient Medications on File Prior to Encounter   Medication Sig Dispense Refill    aspirin 81 MG chewable tablet Take 1 tablet by mouth daily Resume on 8/5/2024 as per surgery recommendations 30 tablet 3    ferrous sulfate (IRON 325) 325 (65 Fe) MG tablet Take 1 tablet by mouth daily (with breakfast)      atorvastatin (LIPITOR) 40 MG tablet Take 1 tablet by mouth daily      vitamin D (CHOLECALCIFEROL) 25 MCG (1000 UT) TABS tablet Take 1 tablet by mouth daily      lisinopril-hydroCHLOROthiazide (PRINZIDE;ZESTORETIC) 20-25 MG per tablet Take 1 tablet by mouth daily      acetaminophen (AMINOFEN) 325 MG tablet Take 2 tablets by mouth every 6 hours for 7 days 56 tablet 0     No Known Allergies    Review of Systems  Pertinent items are noted in HPI.       Objective:      BP (!) 126/57   Pulse 59   Temp 98.1 °F (36.7 °C) (Oral)   Resp 18   Ht 1.727 m (5' 8\")   Wt 71.4 kg (157 lb 6.5 oz)   SpO2 99%   BMI 23.93 kg/m²     General:   alert, appears stated age, and cooperative   Lungs:   clear to auscultation bilaterally   Heart:   regular rate and rhythm, S1, S2 normal, no murmur, click, rub or gallop   Abdomen:  Soft, non-tender, hypoactive bowel sounds      Imaging  IMPRESSION:  Dilated ileal loops due to obstruction and adhesion versus ileus in the setting  of nonspecific

## 2025-02-07 ENCOUNTER — HOSPITAL ENCOUNTER (INPATIENT)
Facility: HOSPITAL | Age: 85
Discharge: HOME | DRG: 389 | End: 2025-02-10
Payer: MEDICARE

## 2025-02-07 VITALS
OXYGEN SATURATION: 100 % | HEIGHT: 68 IN | HEART RATE: 77 BPM | RESPIRATION RATE: 16 BRPM | TEMPERATURE: 98.1 F | SYSTOLIC BLOOD PRESSURE: 121 MMHG | BODY MASS INDEX: 23.86 KG/M2 | WEIGHT: 157.41 LBS | DIASTOLIC BLOOD PRESSURE: 66 MMHG

## 2025-02-07 LAB
ANION GAP SERPL CALC-SCNC: 3 MMOL/L (ref 2–12)
BUN SERPL-MCNC: 31 MG/DL (ref 6–20)
BUN/CREAT SERPL: 19 (ref 12–20)
CALCIUM SERPL-MCNC: 8.9 MG/DL (ref 8.5–10.1)
CHLORIDE SERPL-SCNC: 115 MMOL/L (ref 97–108)
CO2 SERPL-SCNC: 26 MMOL/L (ref 21–32)
CREAT SERPL-MCNC: 1.65 MG/DL (ref 0.7–1.3)
GLUCOSE SERPL-MCNC: 71 MG/DL (ref 65–100)
POTASSIUM SERPL-SCNC: 3.8 MMOL/L (ref 3.5–5.1)
SODIUM SERPL-SCNC: 144 MMOL/L (ref 136–145)

## 2025-02-07 PROCEDURE — 6360000002 HC RX W HCPCS: Performed by: STUDENT IN AN ORGANIZED HEALTH CARE EDUCATION/TRAINING PROGRAM

## 2025-02-07 PROCEDURE — APPNB30 APP NON BILLABLE TIME 0-30 MINS: Performed by: NURSE PRACTITIONER

## 2025-02-07 PROCEDURE — 36415 COLL VENOUS BLD VENIPUNCTURE: CPT

## 2025-02-07 PROCEDURE — 80048 BASIC METABOLIC PNL TOTAL CA: CPT

## 2025-02-07 PROCEDURE — 74019 RADEX ABDOMEN 2 VIEWS: CPT

## 2025-02-07 RX ORDER — POLYETHYLENE GLYCOL 3350 17 G/17G
17 POWDER, FOR SOLUTION ORAL DAILY
Status: DISCONTINUED | OUTPATIENT
Start: 2025-02-07 | End: 2025-02-07 | Stop reason: HOSPADM

## 2025-02-07 RX ADMIN — HEPARIN SODIUM 5000 UNITS: 5000 INJECTION INTRAVENOUS; SUBCUTANEOUS at 05:27

## 2025-02-07 ASSESSMENT — PAIN SCALES - GENERAL
PAINLEVEL_OUTOF10: 0
PAINLEVEL_OUTOF10: 0

## 2025-02-07 NOTE — PROGRESS NOTES
End of Shift Note    Bedside shift change report given to Emerita EVANS (oncoming nurse) by Agnes Wilson RN (offgoing nurse).  Report included the following information SBAR, Kardex, and MAR    Shift worked:  night     Shift summary and any significant changes:     Patient independent using bathroom ,no nausea nor vomiting reported,nil pain had  oral liquid diet and retained ,passing flatus ,had a bowel movement well formed stool.     Concerns for physician to address:  none     Zone phone for oncoming shift:   [phone]       Activity:  Level of Assistance: Minimal assist, patient does 75% or more  Number times ambulated in hallways past shift: 0  Number of times OOB to chair past shift: 0    Cardiac:   Cardiac Monitoring: Yes           Access:  Current line(s): PIV     Genitourinary:   Urinary Status: Voiding, Bathroom privileges    Respiratory:   O2 Device: None (Room air)  Chronic home O2 use?: NO  Incentive spirometer at bedside: YES    GI:  Last BM (including prior to admit): 02/06/25  Current diet:  ADULT DIET; Clear Liquid  DIET ONE TIME MESSAGE;  Passing flatus: YES    Pain Management:   Patient states pain is manageable on current regimen: YES    Skin:  Rudy Scale Score: 21  Interventions: Wound Offloading (Prevention Methods): Bed, pressure reduction mattress, Elevate heels, Pillows, Repositioning    Patient Safety:  Fall Risk: Nursing Judgement-Fall Risk High(Add Comments): Yes  Fall Risk Interventions  Nursing Judgement-Fall Risk High(Add Comments): Yes  Toilet Every 2 Hours-In Advance of Need: Yes  Hourly Visual Checks: In bed, Awake  Fall Visual Posted: Socks, Fall sign posted  Room Door Open: Deferred to promote rest  Alarm On: Bed  Patient Moved Closer to Nursing Station: No    Active Consults:   IP CONSULT TO GENERAL SURGERY    Length of Stay:  Expected LOS: 3  Actual LOS: 2    Agnes Wilson RN

## 2025-02-07 NOTE — PROGRESS NOTES
0971 Dr bustamante notified that patient has been sosa 49-55 sinus sosa. Pt is asymptomatic. Pt aware to fu with PCP / cardiology. Per md pt still clear for dc

## 2025-02-07 NOTE — PLAN OF CARE
Problem: Pain  Goal: Verbalizes/displays adequate comfort level or baseline comfort level  2/7/2025 0809 by Jaylen Mariano, RN  Outcome: Progressing  2/6/2025 2239 by Agnes Wilson RN  Outcome: Progressing     Problem: Safety - Adult  Goal: Free from fall injury  2/7/2025 0809 by Jaylen Mariano, RN  Outcome: Progressing  2/6/2025 2239 by Agnes Wilson RN  Outcome: Progressing

## 2025-02-07 NOTE — CARE COORDINATION
Care Management Initial Assessment       RUR:12%  Readmission? No  1st IM letter given? Yes   1st  letter given: No    CM introduced self and role to pt, verified pt demographics, insurance info,emergency contact and ACD.   Pt lives with alone in one story home. Independent with  ADL's, ambulates with cane or walker and drives.  DME: cane, walker  HH: none  SNF:none    Uses [facility] in   [place].    Disposition:    Home   Son to provide transportation    Pt cleared from CM standpoint.         02/07/25 1500   Service Assessment   Patient Orientation Alert and Oriented   Cognition Alert   History Provided By Patient   Primary Caregiver Self   Support Systems Children;Family Members  (Son and Nephew)   Patient's Healthcare Decision Maker is: Legal Next of Kin   PCP Verified by CM No   Prior Functional Level Independent in ADLs/IADLs   Current Functional Level Independent in ADLs/IADLs   Can patient return to prior living arrangement Yes   Ability to make needs known: Good   Family able to assist with home care needs: Yes   Would you like for me to discuss the discharge plan with any other family members/significant others, and if so, who? Yes  (Son, Nesha Lewis and nephew-Alexandra Nephew)   Financial Resources None   Community Resources None   Social/Functional History   Lives With Alone   Type of Home House   Home Layout One level   Home Equipment Cane;Walker - Standard   Receives Help From Family   Prior Level of Assist for ADLs Independent   Prior Level of Assist for Homemaking Independent   Ambulation Assistance Independent   Prior Level of Assist for Transfers Independent   Active  Yes   Mode of Transportation Car   Occupation Retired   Discharge Planning   Type of Residence House   Living Arrangements Alone   Current Services Prior To Admission None   Potential Assistance Needed N/A   DME Ordered? No   Type of Home Care Services None   Patient expects to be discharged to: [facility]

## 2025-02-07 NOTE — PLAN OF CARE
Problem: Pain  Goal: Verbalizes/displays adequate comfort level or baseline comfort level  2/7/2025 1310 by Jaylen Mariano, RN  Outcome: Adequate for Discharge  2/7/2025 0809 by Jaylen Mariano, RN  Outcome: Progressing     Problem: Safety - Adult  Goal: Free from fall injury  2/7/2025 1310 by Jaylen Marinao, RN  Outcome: Adequate for Discharge  2/7/2025 0809 by Jaylen Mariano, RN  Outcome: Progressing       Dc paperwork reviewed with patient. Verbalized understanding with no further questions. Verbalized understanding of fu appointments. Pt ate lunch and tolerated well. Denies pain. Oob and ambulating independently

## 2025-02-07 NOTE — PROGRESS NOTES
Surgery NP Note    Clinical decision making made in collaboration with Dr. Cameron who is aware of and in agreement with treatment plan.     Xray reviewed. No obstruction. Patient is on a CLEAR LIQUID DIET per primary team and tolerating.     Plan:  - Low fiber diet  - No plans for surgery  - Surgery to sign off  - Ok for d/c when tolerating diet and cleared by attending.     LEVI Weeks - NP

## 2025-02-08 NOTE — DISCHARGE SUMMARY
Discharge Summary    Name: Alexandr Lewis  [id number]  YOB: 1940 (Age: 84 y.o.)   Date of Admission: 2/5/2025  Date of Discharge: 2/7/2025  Attending Physician: No att. providers found    Discharge Diagnosis: Ileus versus small bowel obstruction    Consultations:  IP CONSULT TO GENERAL SURGERY      Brief Admission History/Reason for Admission Per Francisca Howard MD:   Alexandr Lewis is a 84 y.o.  male with PMHx significant for SBO, inguinal hernia status post mesh repair 7/24, CKD stage III, hypertension, hyperlipidemia presents with complaints of intractable nausea vomiting with abdominal pain  -Patient states he was in his usual state of health until earlier today when he began to experience severe abdominal pain in the epigastric region, associated with a feeling of a knot, nausea, vomiting of recently ingested meals, multiple episodes of loose stools, pain progressively worsened, for which reason he decided to present to the emergency department.  He denies fevers, chills, shortness of breath.   Evaluation in the ED was concerning for SBO versus ileus seen on CT abdomen pelvis, ED provider discussed with general surgery who recommended admission to hospitalist service for conservative management    Brief Hospital Course by Main Problems:   I examined the patient next day, the initial plan was to proceed with Gastrografin barium swallow and to have a small bowel series, NG tube placed, however the NG was dislodged and came out.  Next day the patient symptoms significantly improved, he started passing gas, started with clear liquid, patient was able to tolerate, will advance to full liquid and then regular diet if tolerated well, discharged home.  Of note during this admission patient was consulted with general surgery.  They agree with the above management    Discharge Exam:  Patient seen and examined by me on discharge day.  Pertinent Findings:  No data  found.    Gen:    Not in distress  Chest: Clear lungs  CVS:   Regular rhythm.  No edema  Abd:  Soft, not distended, not tender  Neuro: awake, moving all exts    Discharge/Recent Laboratory Results:  Recent Labs     02/07/25  0355      K 3.8   *   CO2 26   BUN 31*   CREATININE 1.65*   GLUCOSE 71   CALCIUM 8.9     Recent Labs     02/06/25  0406   HGB 10.6*   HCT 31.9*   WBC 10.8          Discharge Medications:     Medication List        CONTINUE taking these medications      acetaminophen 325 MG tablet  Commonly known as: Aminofen  Take 2 tablets by mouth every 6 hours for 7 days     aspirin 81 MG chewable tablet  Take 1 tablet by mouth daily Resume on 8/5/2024 as per surgery recommendations     atorvastatin 40 MG tablet  Commonly known as: LIPITOR     ferrous sulfate 325 (65 Fe) MG tablet  Commonly known as: IRON 325     lisinopril-hydroCHLOROthiazide 20-25 MG per tablet  Commonly known as: PRINZIDE;ZESTORETIC     vitamin D 25 MCG (1000 UT) Tabs tablet  Commonly known as: CHOLECALCIFEROL                  DISPOSITION:    Home with Family:       Home with HH/PT/OT/RN:    SNF/LTC:    NAGA:    OTHER:            Code status:   Recommended diet: cardiac diet  Recommended activity: activity as tolerated  Wound care: None      Follow up with:   PCP : Valentina Hanson APRN - NP Trevino, Amanda, APRN - NP  [address] [place] [id number] [phone]    Follow up in 1 week(s)            Total time in minutes spent coordinating this discharge (includes going over instructions, follow-up, prescriptions, and preparing report for sign off to her PCP) :  35 minutes

## 2025-02-09 LAB
EKG ATRIAL RATE: 60 BPM
EKG DIAGNOSIS: NORMAL
EKG P AXIS: 55 DEGREES
EKG P-R INTERVAL: 154 MS
EKG Q-T INTERVAL: 432 MS
EKG QRS DURATION: 94 MS
EKG QTC CALCULATION (BAZETT): 432 MS
EKG R AXIS: 59 DEGREES
EKG T AXIS: 62 DEGREES
EKG VENTRICULAR RATE: 60 BPM

## 2025-02-13 ENCOUNTER — HOSPITAL ENCOUNTER (INPATIENT)
Facility: HOSPITAL | Age: 85
LOS: 13 days | Discharge: HOME HEALTH | DRG: 234 | End: 2025-02-27
Attending: EMERGENCY MEDICINE | Admitting: STUDENT IN AN ORGANIZED HEALTH CARE EDUCATION/TRAINING PROGRAM
Payer: MEDICARE

## 2025-02-13 DIAGNOSIS — K45.8 INTERNAL HERNIA: ICD-10-CM

## 2025-02-13 DIAGNOSIS — I21.4 NSTEMI (NON-ST ELEVATED MYOCARDIAL INFARCTION) (HCC): ICD-10-CM

## 2025-02-13 DIAGNOSIS — Z98.890 S/P LEFT ATRIAL APPENDAGE LIGATION: ICD-10-CM

## 2025-02-13 DIAGNOSIS — I25.85 CHRONIC CORONARY MICROVASCULAR DYSFUNCTION: ICD-10-CM

## 2025-02-13 DIAGNOSIS — I24.9 ACUTE CORONARY SYNDROME (HCC): Primary | ICD-10-CM

## 2025-02-13 DIAGNOSIS — Z95.1 S/P CABG X 3: ICD-10-CM

## 2025-02-13 PROCEDURE — 99285 EMERGENCY DEPT VISIT HI MDM: CPT

## 2025-02-13 ASSESSMENT — PAIN DESCRIPTION - LOCATION: LOCATION: CHEST

## 2025-02-13 ASSESSMENT — PAIN - FUNCTIONAL ASSESSMENT: PAIN_FUNCTIONAL_ASSESSMENT: 0-10

## 2025-02-13 ASSESSMENT — PAIN DESCRIPTION - PAIN TYPE: TYPE: ACUTE PAIN

## 2025-02-13 ASSESSMENT — PAIN SCALES - GENERAL: PAINLEVEL_OUTOF10: 1

## 2025-02-13 ASSESSMENT — PAIN DESCRIPTION - ORIENTATION: ORIENTATION: MID

## 2025-02-13 ASSESSMENT — PAIN DESCRIPTION - DESCRIPTORS: DESCRIPTORS: STABBING

## 2025-02-14 ENCOUNTER — APPOINTMENT (OUTPATIENT)
Facility: HOSPITAL | Age: 85
DRG: 234 | End: 2025-02-14
Payer: MEDICARE

## 2025-02-14 PROBLEM — I21.4 NSTEMI (NON-ST ELEVATED MYOCARDIAL INFARCTION) (HCC): Status: ACTIVE | Noted: 2025-02-14

## 2025-02-14 LAB
ALBUMIN SERPL-MCNC: 2.8 G/DL (ref 3.5–5)
ALBUMIN/GLOB SERPL: 0.8 (ref 1.1–2.2)
ALP SERPL-CCNC: 90 U/L (ref 45–117)
ALT SERPL-CCNC: 52 U/L (ref 12–78)
ANION GAP SERPL CALC-SCNC: 4 MMOL/L (ref 2–12)
APTT PPP: 22.5 SEC (ref 22.1–31)
AST SERPL-CCNC: 52 U/L (ref 15–37)
BASOPHILS # BLD: 0.03 K/UL (ref 0–0.1)
BASOPHILS NFR BLD: 0.4 % (ref 0–1)
BILIRUB SERPL-MCNC: 0.8 MG/DL (ref 0.2–1)
BUN SERPL-MCNC: 27 MG/DL (ref 6–20)
BUN/CREAT SERPL: 14 (ref 12–20)
CALCIUM SERPL-MCNC: 10.2 MG/DL (ref 8.5–10.1)
CHLORIDE SERPL-SCNC: 107 MMOL/L (ref 97–108)
CHOLEST SERPL-MCNC: 86 MG/DL
CO2 SERPL-SCNC: 28 MMOL/L (ref 21–32)
CREAT SERPL-MCNC: 1.87 MG/DL (ref 0.7–1.3)
DIFFERENTIAL METHOD BLD: ABNORMAL
EKG ATRIAL RATE: 82 BPM
EKG DIAGNOSIS: NORMAL
EKG P AXIS: 66 DEGREES
EKG P-R INTERVAL: 170 MS
EKG Q-T INTERVAL: 362 MS
EKG QRS DURATION: 78 MS
EKG QTC CALCULATION (BAZETT): 422 MS
EKG R AXIS: 66 DEGREES
EKG T AXIS: 92 DEGREES
EKG VENTRICULAR RATE: 82 BPM
EOSINOPHIL # BLD: 0.06 K/UL (ref 0–0.4)
EOSINOPHIL NFR BLD: 0.9 % (ref 0–7)
ERYTHROCYTE [DISTWIDTH] IN BLOOD BY AUTOMATED COUNT: 14.5 % (ref 11.5–14.5)
ERYTHROCYTE [DISTWIDTH] IN BLOOD BY AUTOMATED COUNT: 14.6 % (ref 11.5–14.5)
EST. AVERAGE GLUCOSE BLD GHB EST-MCNC: 114 MG/DL
GLOBULIN SER CALC-MCNC: 3.5 G/DL (ref 2–4)
GLUCOSE SERPL-MCNC: 104 MG/DL (ref 65–100)
HBA1C MFR BLD: 5.6 % (ref 4–5.6)
HCT VFR BLD AUTO: 30.6 % (ref 36.6–50.3)
HCT VFR BLD AUTO: 33 % (ref 36.6–50.3)
HDLC SERPL-MCNC: 46 MG/DL
HDLC SERPL: 1.9 (ref 0–5)
HGB BLD-MCNC: 10.2 G/DL (ref 12.1–17)
HGB BLD-MCNC: 10.9 G/DL (ref 12.1–17)
IMM GRANULOCYTES # BLD AUTO: 0.01 K/UL (ref 0–0.04)
IMM GRANULOCYTES NFR BLD AUTO: 0.1 % (ref 0–0.5)
INR PPP: 1.2 (ref 0.9–1.1)
LACTATE SERPL-SCNC: 1.4 MMOL/L (ref 0.4–2)
LDLC SERPL CALC-MCNC: 31 MG/DL (ref 0–100)
LIPASE SERPL-CCNC: 70 U/L (ref 13–75)
LYMPHOCYTES # BLD: 1.83 K/UL (ref 0.8–3.5)
LYMPHOCYTES NFR BLD: 26.2 % (ref 12–49)
MCH RBC QN AUTO: 26.6 PG (ref 26–34)
MCH RBC QN AUTO: 26.7 PG (ref 26–34)
MCHC RBC AUTO-ENTMCNC: 33 G/DL (ref 30–36.5)
MCHC RBC AUTO-ENTMCNC: 33.3 G/DL (ref 30–36.5)
MCV RBC AUTO: 79.7 FL (ref 80–99)
MCV RBC AUTO: 80.7 FL (ref 80–99)
MONOCYTES # BLD: 0.74 K/UL (ref 0–1)
MONOCYTES NFR BLD: 10.6 % (ref 5–13)
NEUTS SEG # BLD: 4.31 K/UL (ref 1.8–8)
NEUTS SEG NFR BLD: 61.8 % (ref 32–75)
NRBC # BLD: 0 K/UL (ref 0–0.01)
NRBC # BLD: 0 K/UL (ref 0–0.01)
NRBC BLD-RTO: 0 PER 100 WBC
NRBC BLD-RTO: 0 PER 100 WBC
PLATELET # BLD AUTO: 179 K/UL (ref 150–400)
PLATELET # BLD AUTO: 202 K/UL (ref 150–400)
PMV BLD AUTO: 10.1 FL (ref 8.9–12.9)
PMV BLD AUTO: 10.3 FL (ref 8.9–12.9)
POTASSIUM SERPL-SCNC: 5.1 MMOL/L (ref 3.5–5.1)
PROT SERPL-MCNC: 6.3 G/DL (ref 6.4–8.2)
PROTHROMBIN TIME: 12.2 SEC (ref 9.2–11.2)
RBC # BLD AUTO: 3.84 M/UL (ref 4.1–5.7)
RBC # BLD AUTO: 4.09 M/UL (ref 4.1–5.7)
SODIUM SERPL-SCNC: 139 MMOL/L (ref 136–145)
THERAPEUTIC RANGE: NORMAL SECS (ref 58–77)
TRIGL SERPL-MCNC: 45 MG/DL
TROPONIN I SERPL HS-MCNC: 337 NG/L (ref 0–76)
TROPONIN I SERPL HS-MCNC: 929 NG/L (ref 0–76)
TSH SERPL DL<=0.05 MIU/L-ACNC: 2.48 UIU/ML (ref 0.36–3.74)
UFH PPP CHRO-ACNC: 0.44 IU/ML
UFH PPP CHRO-ACNC: 0.61 IU/ML
UFH PPP CHRO-ACNC: 0.63 IU/ML
UFH PPP CHRO-ACNC: <0.1 IU/ML
VLDLC SERPL CALC-MCNC: 9 MG/DL
WBC # BLD AUTO: 7 K/UL (ref 4.1–11.1)
WBC # BLD AUTO: 7.5 K/UL (ref 4.1–11.1)

## 2025-02-14 PROCEDURE — 6370000000 HC RX 637 (ALT 250 FOR IP): Performed by: EMERGENCY MEDICINE

## 2025-02-14 PROCEDURE — 84443 ASSAY THYROID STIM HORMONE: CPT

## 2025-02-14 PROCEDURE — 2500000003 HC RX 250 WO HCPCS: Performed by: STUDENT IN AN ORGANIZED HEALTH CARE EDUCATION/TRAINING PROGRAM

## 2025-02-14 PROCEDURE — 2580000003 HC RX 258: Performed by: INTERNAL MEDICINE

## 2025-02-14 PROCEDURE — 93005 ELECTROCARDIOGRAM TRACING: CPT | Performed by: EMERGENCY MEDICINE

## 2025-02-14 PROCEDURE — 6360000004 HC RX CONTRAST MEDICATION: Performed by: RADIOLOGY

## 2025-02-14 PROCEDURE — 36415 COLL VENOUS BLD VENIPUNCTURE: CPT

## 2025-02-14 PROCEDURE — 85025 COMPLETE CBC W/AUTO DIFF WBC: CPT

## 2025-02-14 PROCEDURE — 84484 ASSAY OF TROPONIN QUANT: CPT

## 2025-02-14 PROCEDURE — 85027 COMPLETE CBC AUTOMATED: CPT

## 2025-02-14 PROCEDURE — 83036 HEMOGLOBIN GLYCOSYLATED A1C: CPT

## 2025-02-14 PROCEDURE — 83605 ASSAY OF LACTIC ACID: CPT

## 2025-02-14 PROCEDURE — 6360000002 HC RX W HCPCS: Performed by: STUDENT IN AN ORGANIZED HEALTH CARE EDUCATION/TRAINING PROGRAM

## 2025-02-14 PROCEDURE — 85610 PROTHROMBIN TIME: CPT

## 2025-02-14 PROCEDURE — 74177 CT ABD & PELVIS W/CONTRAST: CPT

## 2025-02-14 PROCEDURE — 85730 THROMBOPLASTIN TIME PARTIAL: CPT

## 2025-02-14 PROCEDURE — 80061 LIPID PANEL: CPT

## 2025-02-14 PROCEDURE — 80053 COMPREHEN METABOLIC PANEL: CPT

## 2025-02-14 PROCEDURE — 2580000003 HC RX 258: Performed by: EMERGENCY MEDICINE

## 2025-02-14 PROCEDURE — 6370000000 HC RX 637 (ALT 250 FOR IP): Performed by: STUDENT IN AN ORGANIZED HEALTH CARE EDUCATION/TRAINING PROGRAM

## 2025-02-14 PROCEDURE — 2060000000 HC ICU INTERMEDIATE R&B

## 2025-02-14 PROCEDURE — 96374 THER/PROPH/DIAG INJ IV PUSH: CPT

## 2025-02-14 PROCEDURE — 2500000003 HC RX 250 WO HCPCS: Performed by: EMERGENCY MEDICINE

## 2025-02-14 PROCEDURE — 83690 ASSAY OF LIPASE: CPT

## 2025-02-14 PROCEDURE — 85520 HEPARIN ASSAY: CPT

## 2025-02-14 PROCEDURE — 6360000004 HC RX CONTRAST MEDICATION: Performed by: EMERGENCY MEDICINE

## 2025-02-14 PROCEDURE — 6370000000 HC RX 637 (ALT 250 FOR IP): Performed by: INTERNAL MEDICINE

## 2025-02-14 RX ORDER — ONDANSETRON 2 MG/ML
4 INJECTION INTRAMUSCULAR; INTRAVENOUS EVERY 6 HOURS PRN
Status: DISCONTINUED | OUTPATIENT
Start: 2025-02-14 | End: 2025-02-14 | Stop reason: SDUPTHER

## 2025-02-14 RX ORDER — ATORVASTATIN CALCIUM 40 MG/1
40 TABLET, FILM COATED ORAL DAILY
Status: DISCONTINUED | OUTPATIENT
Start: 2025-02-14 | End: 2025-02-14

## 2025-02-14 RX ORDER — HEPARIN SODIUM 1000 [USP'U]/ML
4000 INJECTION, SOLUTION INTRAVENOUS; SUBCUTANEOUS ONCE
Status: COMPLETED | OUTPATIENT
Start: 2025-02-14 | End: 2025-02-14

## 2025-02-14 RX ORDER — ACETAMINOPHEN 650 MG/1
650 SUPPOSITORY RECTAL EVERY 6 HOURS PRN
Status: DISCONTINUED | OUTPATIENT
Start: 2025-02-14 | End: 2025-02-21

## 2025-02-14 RX ORDER — HEPARIN SODIUM 1000 [USP'U]/ML
2000 INJECTION, SOLUTION INTRAVENOUS; SUBCUTANEOUS PRN
Status: DISCONTINUED | OUTPATIENT
Start: 2025-02-14 | End: 2025-02-21

## 2025-02-14 RX ORDER — HYDROCHLOROTHIAZIDE 25 MG/1
25 TABLET ORAL DAILY
Status: DISCONTINUED | OUTPATIENT
Start: 2025-02-14 | End: 2025-02-21

## 2025-02-14 RX ORDER — LISINOPRIL 20 MG/1
20 TABLET ORAL DAILY
Status: DISCONTINUED | OUTPATIENT
Start: 2025-02-14 | End: 2025-02-21

## 2025-02-14 RX ORDER — FENTANYL CITRATE 50 UG/ML
25 INJECTION, SOLUTION INTRAMUSCULAR; INTRAVENOUS
Status: DISCONTINUED | OUTPATIENT
Start: 2025-02-14 | End: 2025-02-14

## 2025-02-14 RX ORDER — HEPARIN SODIUM 1000 [USP'U]/ML
4000 INJECTION, SOLUTION INTRAVENOUS; SUBCUTANEOUS PRN
Status: DISCONTINUED | OUTPATIENT
Start: 2025-02-14 | End: 2025-02-21

## 2025-02-14 RX ORDER — ACETAMINOPHEN 325 MG/1
650 TABLET ORAL EVERY 6 HOURS PRN
Status: DISCONTINUED | OUTPATIENT
Start: 2025-02-14 | End: 2025-02-21

## 2025-02-14 RX ORDER — LISINOPRIL AND HYDROCHLOROTHIAZIDE 20; 25 MG/1; MG/1
1 TABLET ORAL DAILY
Status: DISCONTINUED | OUTPATIENT
Start: 2025-02-14 | End: 2025-02-14

## 2025-02-14 RX ORDER — ONDANSETRON 2 MG/ML
4 INJECTION INTRAMUSCULAR; INTRAVENOUS EVERY 6 HOURS PRN
Status: DISCONTINUED | OUTPATIENT
Start: 2025-02-14 | End: 2025-02-21

## 2025-02-14 RX ORDER — SODIUM CHLORIDE 9 MG/ML
INJECTION, SOLUTION INTRAVENOUS PRN
Status: DISCONTINUED | OUTPATIENT
Start: 2025-02-14 | End: 2025-02-21

## 2025-02-14 RX ORDER — ONDANSETRON 4 MG/1
4 TABLET, ORALLY DISINTEGRATING ORAL EVERY 8 HOURS PRN
Status: DISCONTINUED | OUTPATIENT
Start: 2025-02-14 | End: 2025-02-21

## 2025-02-14 RX ORDER — FERROUS SULFATE 325(65) MG
325 TABLET ORAL
Status: DISCONTINUED | OUTPATIENT
Start: 2025-02-14 | End: 2025-02-21

## 2025-02-14 RX ORDER — IOPAMIDOL 755 MG/ML
100 INJECTION, SOLUTION INTRAVASCULAR
Status: COMPLETED | OUTPATIENT
Start: 2025-02-14 | End: 2025-02-14

## 2025-02-14 RX ORDER — ATORVASTATIN CALCIUM 40 MG/1
40 TABLET, FILM COATED ORAL NIGHTLY
Status: DISCONTINUED | OUTPATIENT
Start: 2025-02-14 | End: 2025-02-27 | Stop reason: HOSPADM

## 2025-02-14 RX ORDER — METOPROLOL SUCCINATE 25 MG/1
12.5 TABLET, EXTENDED RELEASE ORAL DAILY
Status: DISCONTINUED | OUTPATIENT
Start: 2025-02-14 | End: 2025-02-21

## 2025-02-14 RX ORDER — DIATRIZOATE MEGLUMINE AND DIATRIZOATE SODIUM 660; 100 MG/ML; MG/ML
30 SOLUTION ORAL; RECTAL
Status: DISCONTINUED | OUTPATIENT
Start: 2025-02-14 | End: 2025-02-14

## 2025-02-14 RX ORDER — HEPARIN SODIUM 10000 [USP'U]/100ML
5-30 INJECTION, SOLUTION INTRAVENOUS CONTINUOUS
Status: DISCONTINUED | OUTPATIENT
Start: 2025-02-14 | End: 2025-02-21

## 2025-02-14 RX ORDER — METOPROLOL TARTRATE 1 MG/ML
5 INJECTION, SOLUTION INTRAVENOUS EVERY 6 HOURS PRN
Status: DISCONTINUED | OUTPATIENT
Start: 2025-02-14 | End: 2025-02-21

## 2025-02-14 RX ORDER — POLYETHYLENE GLYCOL 3350 17 G/17G
17 POWDER, FOR SOLUTION ORAL DAILY PRN
Status: DISCONTINUED | OUTPATIENT
Start: 2025-02-14 | End: 2025-02-21

## 2025-02-14 RX ORDER — ASPIRIN 81 MG/1
81 TABLET, CHEWABLE ORAL DAILY
Status: DISCONTINUED | OUTPATIENT
Start: 2025-02-14 | End: 2025-02-27 | Stop reason: HOSPADM

## 2025-02-14 RX ORDER — SODIUM CHLORIDE 9 MG/ML
INJECTION, SOLUTION INTRAVENOUS CONTINUOUS
Status: DISCONTINUED | OUTPATIENT
Start: 2025-02-14 | End: 2025-02-21

## 2025-02-14 RX ORDER — ASPIRIN 325 MG
325 TABLET ORAL
Status: COMPLETED | OUTPATIENT
Start: 2025-02-14 | End: 2025-02-14

## 2025-02-14 RX ORDER — SODIUM CHLORIDE 0.9 % (FLUSH) 0.9 %
5-40 SYRINGE (ML) INJECTION EVERY 12 HOURS SCHEDULED
Status: DISCONTINUED | OUTPATIENT
Start: 2025-02-14 | End: 2025-02-21

## 2025-02-14 RX ORDER — SODIUM CHLORIDE 0.9 % (FLUSH) 0.9 %
5-40 SYRINGE (ML) INJECTION PRN
Status: DISCONTINUED | OUTPATIENT
Start: 2025-02-14 | End: 2025-02-21

## 2025-02-14 RX ADMIN — SODIUM CHLORIDE: 0.9 INJECTION, SOLUTION INTRAVENOUS at 21:31

## 2025-02-14 RX ADMIN — FERROUS SULFATE TAB 325 MG (65 MG ELEMENTAL FE) 325 MG: 325 (65 FE) TAB at 08:36

## 2025-02-14 RX ADMIN — SODIUM CHLORIDE, PRESERVATIVE FREE 10 ML: 5 INJECTION INTRAVENOUS at 08:37

## 2025-02-14 RX ADMIN — HEPARIN SODIUM 12 UNITS/KG/HR: 10000 INJECTION, SOLUTION INTRAVENOUS at 02:59

## 2025-02-14 RX ADMIN — SODIUM CHLORIDE, PRESERVATIVE FREE 10 ML: 5 INJECTION INTRAVENOUS at 20:45

## 2025-02-14 RX ADMIN — ATORVASTATIN CALCIUM 40 MG: 40 TABLET, FILM COATED ORAL at 20:46

## 2025-02-14 RX ADMIN — ASPIRIN 81 MG: 81 TABLET, CHEWABLE ORAL at 08:36

## 2025-02-14 RX ADMIN — ASPIRIN 325 MG: 325 TABLET ORAL at 01:52

## 2025-02-14 RX ADMIN — SODIUM CHLORIDE: 0.9 INJECTION, SOLUTION INTRAVENOUS at 11:07

## 2025-02-14 RX ADMIN — DIATRIZOATE MEGLUMINE AND DIATRIZOATE SODIUM 30 ML: 660; 100 LIQUID ORAL; RECTAL at 01:00

## 2025-02-14 RX ADMIN — FAMOTIDINE 20 MG: 10 INJECTION, SOLUTION INTRAVENOUS at 01:01

## 2025-02-14 RX ADMIN — IOPAMIDOL 100 ML: 755 INJECTION, SOLUTION INTRAVENOUS at 02:05

## 2025-02-14 RX ADMIN — NITROGLYCERIN 1 INCH: 20 OINTMENT TOPICAL at 01:53

## 2025-02-14 RX ADMIN — HEPARIN SODIUM 4000 UNITS: 1000 INJECTION INTRAVENOUS; SUBCUTANEOUS at 02:50

## 2025-02-14 ASSESSMENT — PAIN SCALES - GENERAL
PAINLEVEL_OUTOF10: 0

## 2025-02-14 ASSESSMENT — HEART SCORE: ECG: NON-SPECIFC REPOLARIZATION DISTURBANCE/LBTB/PM

## 2025-02-14 NOTE — ED NOTES
Bedside and Verbal shift change report given to Jackie (oncoming nurse) by Luh (offgoing nurse). Report included the following information Nurse Handoff Report, Index, ED Encounter Summary, ED SBAR, Adult Overview, MAR, and Recent Results.

## 2025-02-14 NOTE — H&P
Hospitalist Admission Note    NAME:  Alexandr Lewis   :  1940   MRN:  [id number]     Date/Time:  2025 2:31 AM    Patient PCP: Valentina Hanson APRN - NP    ______________________________________________________________________  Given the patient's current clinical presentation, I have a high level of concern for decompensation if discharged from the emergency department.  Complex decision making was performed, which includes reviewing the patient's available past medical records, laboratory results, and x-ray films.       My assessment of this patient's clinical condition and my plan of care is as follows.    Assessment / Plan:    Active Problems:  NSTEMI  Essential hypertension  Hyperlipidemia   Iron deficiency anemia  Internal hernia  CKD 3, at baseline    Plan:  NSTEMI  Essential hypertension  Hyperlipidemia   Admit to stepdown unit  Full-strength aspirin given in ED follow with 81 mg daily thereafter  Defer P2 Y12 inhibitor to cardiology preference  Start ACS heparin  Continue PTA atorvastatin, lisinopril hydrochlorothiazide (formulary substitution with components permitted)  Continue Nitro-Bid  Cardiology consulted, greatly appreciate their expertise  Trend troponin  Obtain TTE  Keep n.p.o.  Labs: Lipids, hemoglobin A1c, TSH    Iron deficiency anemia  Continue PTA p.o. iron supplementation    Internal hernia  No radiographic evidence of obstruction-will consult general surgery routinely    CKD 3, at baseline  Trend renal function  Renally dose medications avoid nephrotoxic agents    Medical Decision Making:   I personally reviewed labs: Yes, as listed below  I personally reviewed imaging: CT abdomen pelvis  Toxic drug monitoring: Heparin  Discussed case with: ED provider. After discussion I am in agreement that acuity of patient's medical condition necessitates hospital stay.      Code Status: Full  DVT Prophylaxis: Heparin  GI Prophylaxis: Not indicated  Baseline: Independent    Subjective:

## 2025-02-14 NOTE — ED TRIAGE NOTES
Patient ambulatory to triage from waiting room with complaint of midsternal chest pain. Patient reports pain began a few days ago, he has been taking tums for the pain, reports tonight tums are not working. Patient reports he was recently hospitalized for a GI problem states \"they put a tube down my nose and got a blockage out\".

## 2025-02-14 NOTE — ED PROVIDER NOTES
[facility] EMERGENCY DEPARTMENT  EMERGENCY DEPARTMENT ENCOUNTER       Pt Name: Alexandr Lewis  MRN: [id number]  Birthdate 1940  Date of evaluation: 2/13/2025  Provider: Waqar Roberts MD   PCP: Valentina Hanson APRN - NP  Note Started: 12:51 AM EST 2/14/25     CHIEF COMPLAINT       Chief Complaint   Patient presents with    Chest Pain     Patient reports upper mid-sternal pain, patient reports \"gas pain\" and says he's been taking tums for symptoms - tonight they are not helping        HISTORY OF PRESENT ILLNESS: 1 or more elements      History From: patient, History limited by: none     Alexandr Lewis is a 84 y.o. male with a history of ventral hernia and recent SBO presents to the emergency department chief plaint of chest pain that began approximately 1 hour ago.    Patient reports central chest burning that began approximately 1 hour ago.  Patient took Tums with no relief.  Denies any associated symptoms including shortness of breath, nausea or vomiting or diaphoresis.  No history of CAD.  Patient recently admitted for SBO requiring NG tube.  Reports symptoms do feel similar none currently not vomiting.    Patient denies any difficulty with bowel movements.     Please See MDM for Additional Details of the HPI/PMH  Nursing Notes were all reviewed and agreed with or any disagreements were addressed in the HPI.     REVIEW OF SYSTEMS        Positives and Pertinent negatives as per HPI.    PAST HISTORY     Past Medical History:  Past Medical History:   Diagnosis Date    Arthritis     Cancer (HCC)     PROSTATE    Hx SBO 2021    required surgery for lysis of adhesions    Hypertension     Inguinal hernia bilateral, non-recurrent 2024    Kidney disease stage 3 2019    sees Dr Enrrique Polk    Other ill-defined conditions(799.89)     elevated cholesterol    Ventral hernia 2024       Past Surgical History:  Past Surgical History:   Procedure Laterality Date    ABDOMINAL EXPLORATION SURGERY  2021    lysis of

## 2025-02-14 NOTE — CONSULTS
Acute Care Surgery Consult    Consulted by: Dr. Lewis  PCP: Valentina Hanson APRN - NP      Assessment:     84-year-old gentleman seen in consultation for concern for internal hernia.  Presented with a chief complaint of chest pain, has markedly elevated troponinemia and is undergoing catheterization by cardiology.  He has no current abdominal complaints, denies nausea, vomiting.  He is passing gas and had several bowel movements today.  No concern currently for volvulus or high-grade bowel obstruction.    Plan:     Please reconsult if there is a change in abdominal exam  Currently benign exam without evidence of obstruction      HPI:      Alexandr Lewis is a 84 y.o. male who is seen in consultation for rule out obstruction, internal hernia.  He presented to the ER with a chief complaint of chest pain.  CT during the course of his workup demonstrated some evidence of mesenteric swirling concerning for internal hernia without any evidence of obstruction.  He currently states that he is pain-free from an abdominal standpoint.  Denies any nausea, vomiting.  He did have some constipation several days ago but took MiraLAX and has been having several normal bowel movements.  On exam his abdomen is soft, nontender to deep palpation all quadrants.    Review of Systems:    A 10 point review of systems was negative aside from what is noted in the HPI.    Problem List:     Past Medical History:   Diagnosis Date    Arthritis     Cancer (HCC)     PROSTATE    Hx SBO 2021    required surgery for lysis of adhesions    Hypertension     Inguinal hernia bilateral, non-recurrent 2024    Kidney disease stage 3 2019    sees Dr Enrrique Polk    Other ill-defined conditions(799.89)     elevated cholesterol    Ventral hernia 2024     Past Surgical History:   Procedure Laterality Date    ABDOMINAL EXPLORATION SURGERY  2021    lysis of adhesions from prostatectomy    COLONOSCOPY,DIAGNOSTIC  9/12/2012         INGUINAL HERNIA REPAIR Bilateral

## 2025-02-14 NOTE — CONSULTS
[facility]        Consult    NAME: Alexandr Lewis   :  1940   MRN:  [id number]     Date/Time:  2025 9:49 AM    Patient PCP: Valentina Hanson APRN - NP  ________________________________________________________________________     Assessment:     - Chest pain  - NSTEMI    - Stress 2024 no ischemia, EF 54%  - Echo 2024 nl EF  - sinus with anterior TWI  - HTN  - Dyslipidemia  - CKD with cr cl of 35, cr of 1.8  - s/p hernia repair, SBO/ileus, ?mesenteric ischmia  - Quit tobacco in past can not tell me when  - No etoh, no drugs   in , 3 kids, retired , uses a cane/walker    Cardiologist:  Nikunj        Plan:     - Chest pain, ECG with ant TWI, increased troponin to 929  - Euvolemic  - Sinus    - Rec Cath all r/b/a reviewed, increased risk of CASIMIRO discussed    - Heparin until cath  - Cont asa  - Add metoprolol suc 12.5mg, watch for sosa  - Hold lisinopril HCT  - Add NTP  - Hydration for cath  - Cont atorvastatin       [x]       High complexity decision making was performed in this patient at high risk for decompensation with multiple organ involvement.    We discussed the expected course, resolution and complications of the diagnoses in detail.  Medication risk, benefits, costs, interactions, and alternatives were discussed as indicated.  I advised him to contact the office if his condition worsens, changes or fails to improve as anticipated.  Patient expressed understanding with the diagnoses  and plan.    Subjective:   CHIEF COMPLAINT: Chest pain    HISTORY OF PRESENT ILLNESS:       Alexandr Lewis is a 84 y.o.  male with PMHx as listed below presenting to the emergency department with complaints of acute onset chest pressure/burning that is substernal in location without radiation.  Patient reports no exacerbating or remitting factors.  Initially thought it was reflux and took Tums without relief.  Reports he has been experiencing intermittent episodes for the

## 2025-02-14 NOTE — CARE COORDINATION
Care Management Initial Assessment       RUR: 16% moderate risk for readmission   Readmission? Yes - 2/5-2/7/25 at [facility] for SBO  1st IM letter given? Yes - 2/14/25  1st  letter given: No      Initial note: Chart review completed prior to assessment. CM completed assessment with pt at bedside. CM introduced self, role of CM, verified demographics to ensure accuracy, and discussed transition of care planning. Pt resides in 1 level home with 3 AARON at physical address: [address], [place] [address]. Pt reports that his son is currently staying with him. He reports independence with ADLs using a walker or cane. Pt's son or niece, Alexandra, anticipated to transport home at d/c. Pt is an active . Pt voiced no concerns with transition of care plan at this time. No barriers identified by CM.    Blank AMD provided for review and consideration; pt encouraged to review and advised of opportunities to complete during hospitalization if desired.    Care management will continue to be available to assist as transition of care planning needs arise.    Full readmission assessment below:     02/14/25 1000   Service Assessment   Patient Orientation Alert and Oriented   Cognition Alert   History Provided By Patient   Primary Caregiver Self   Accompanied By/Relationship N/A   Support Systems Children;Family Members  (Son, daughter in law, and niece)   Patient's Healthcare Decision Maker is: Legal Next of Kin  (Pt has no ACP documents on file. Pt is ; pt's only child, Noe Lewis, is legal NOK)   PCP Verified by CM Yes  (Valentina Hanson NP)   Last Visit to PCP Within last 6 months   Prior Functional Level Independent in ADLs/IADLs   Current Functional Level Independent in ADLs/IADLs   Can patient return to prior living arrangement Yes   Ability to make needs known: Good   Family able to assist with home care needs: Yes   Would you like for me to discuss the discharge plan with any other family

## 2025-02-15 LAB
ANION GAP SERPL CALC-SCNC: 4 MMOL/L (ref 2–12)
BASOPHILS # BLD: 0.03 K/UL (ref 0–0.1)
BASOPHILS NFR BLD: 0.5 % (ref 0–1)
BUN SERPL-MCNC: 26 MG/DL (ref 6–20)
BUN/CREAT SERPL: 16 (ref 12–20)
CALCIUM SERPL-MCNC: 8.9 MG/DL (ref 8.5–10.1)
CHLORIDE SERPL-SCNC: 109 MMOL/L (ref 97–108)
CO2 SERPL-SCNC: 26 MMOL/L (ref 21–32)
CREAT SERPL-MCNC: 1.64 MG/DL (ref 0.7–1.3)
DIFFERENTIAL METHOD BLD: ABNORMAL
EOSINOPHIL # BLD: 0.11 K/UL (ref 0–0.4)
EOSINOPHIL NFR BLD: 1.7 % (ref 0–7)
ERYTHROCYTE [DISTWIDTH] IN BLOOD BY AUTOMATED COUNT: 14.6 % (ref 11.5–14.5)
GLUCOSE SERPL-MCNC: 77 MG/DL (ref 65–100)
HCT VFR BLD AUTO: 27.9 % (ref 36.6–50.3)
HGB BLD-MCNC: 9.3 G/DL (ref 12.1–17)
IMM GRANULOCYTES # BLD AUTO: 0.03 K/UL (ref 0–0.04)
IMM GRANULOCYTES NFR BLD AUTO: 0.5 % (ref 0–0.5)
LYMPHOCYTES # BLD: 1.69 K/UL (ref 0.8–3.5)
LYMPHOCYTES NFR BLD: 26.3 % (ref 12–49)
MCH RBC QN AUTO: 27.1 PG (ref 26–34)
MCHC RBC AUTO-ENTMCNC: 33.3 G/DL (ref 30–36.5)
MCV RBC AUTO: 81.3 FL (ref 80–99)
MONOCYTES # BLD: 0.77 K/UL (ref 0–1)
MONOCYTES NFR BLD: 12 % (ref 5–13)
NEUTS SEG # BLD: 3.79 K/UL (ref 1.8–8)
NEUTS SEG NFR BLD: 59 % (ref 32–75)
NRBC # BLD: 0 K/UL (ref 0–0.01)
NRBC BLD-RTO: 0 PER 100 WBC
PLATELET # BLD AUTO: 172 K/UL (ref 150–400)
PMV BLD AUTO: 10.9 FL (ref 8.9–12.9)
POTASSIUM SERPL-SCNC: 5.1 MMOL/L (ref 3.5–5.1)
RBC # BLD AUTO: 3.43 M/UL (ref 4.1–5.7)
SODIUM SERPL-SCNC: 139 MMOL/L (ref 136–145)
UFH PPP CHRO-ACNC: 0.58 IU/ML
UFH PPP CHRO-ACNC: 0.72 IU/ML
WBC # BLD AUTO: 6.4 K/UL (ref 4.1–11.1)

## 2025-02-15 PROCEDURE — 6360000002 HC RX W HCPCS: Performed by: STUDENT IN AN ORGANIZED HEALTH CARE EDUCATION/TRAINING PROGRAM

## 2025-02-15 PROCEDURE — 85520 HEPARIN ASSAY: CPT

## 2025-02-15 PROCEDURE — 85025 COMPLETE CBC W/AUTO DIFF WBC: CPT

## 2025-02-15 PROCEDURE — 6370000000 HC RX 637 (ALT 250 FOR IP): Performed by: INTERNAL MEDICINE

## 2025-02-15 PROCEDURE — 80048 BASIC METABOLIC PNL TOTAL CA: CPT

## 2025-02-15 PROCEDURE — 6370000000 HC RX 637 (ALT 250 FOR IP): Performed by: STUDENT IN AN ORGANIZED HEALTH CARE EDUCATION/TRAINING PROGRAM

## 2025-02-15 PROCEDURE — 2060000000 HC ICU INTERMEDIATE R&B

## 2025-02-15 PROCEDURE — 2500000003 HC RX 250 WO HCPCS: Performed by: STUDENT IN AN ORGANIZED HEALTH CARE EDUCATION/TRAINING PROGRAM

## 2025-02-15 PROCEDURE — 36415 COLL VENOUS BLD VENIPUNCTURE: CPT

## 2025-02-15 PROCEDURE — 2580000003 HC RX 258: Performed by: INTERNAL MEDICINE

## 2025-02-15 RX ADMIN — NITROGLYCERIN 1 INCH: 20 OINTMENT TOPICAL at 17:28

## 2025-02-15 RX ADMIN — ATORVASTATIN CALCIUM 40 MG: 40 TABLET, FILM COATED ORAL at 21:03

## 2025-02-15 RX ADMIN — FERROUS SULFATE TAB 325 MG (65 MG ELEMENTAL FE) 325 MG: 325 (65 FE) TAB at 10:15

## 2025-02-15 RX ADMIN — SODIUM CHLORIDE: 0.9 INJECTION, SOLUTION INTRAVENOUS at 10:14

## 2025-02-15 RX ADMIN — SODIUM CHLORIDE, PRESERVATIVE FREE 10 ML: 5 INJECTION INTRAVENOUS at 21:02

## 2025-02-15 RX ADMIN — NITROGLYCERIN 1 INCH: 20 OINTMENT TOPICAL at 02:53

## 2025-02-15 RX ADMIN — NITROGLYCERIN 1 INCH: 20 OINTMENT TOPICAL at 12:15

## 2025-02-15 RX ADMIN — SODIUM CHLORIDE: 0.9 INJECTION, SOLUTION INTRAVENOUS at 21:02

## 2025-02-15 RX ADMIN — ASPIRIN 81 MG: 81 TABLET, CHEWABLE ORAL at 10:14

## 2025-02-15 RX ADMIN — HEPARIN SODIUM 12 UNITS/KG/HR: 10000 INJECTION, SOLUTION INTRAVENOUS at 07:43

## 2025-02-15 RX ADMIN — METOPROLOL SUCCINATE 12.5 MG: 25 TABLET, EXTENDED RELEASE ORAL at 10:14

## 2025-02-15 RX ADMIN — SODIUM CHLORIDE, PRESERVATIVE FREE 10 ML: 5 INJECTION INTRAVENOUS at 11:19

## 2025-02-15 ASSESSMENT — PAIN DESCRIPTION - ORIENTATION: ORIENTATION: ANTERIOR

## 2025-02-15 ASSESSMENT — PAIN DESCRIPTION - DESCRIPTORS: DESCRIPTORS: ACHING

## 2025-02-15 ASSESSMENT — PAIN SCALES - GENERAL
PAINLEVEL_OUTOF10: 0
PAINLEVEL_OUTOF10: 0
PAINLEVEL_OUTOF10: 4

## 2025-02-15 ASSESSMENT — PAIN DESCRIPTION - LOCATION: LOCATION: CHEST

## 2025-02-15 ASSESSMENT — PAIN - FUNCTIONAL ASSESSMENT: PAIN_FUNCTIONAL_ASSESSMENT: ACTIVITIES ARE NOT PREVENTED

## 2025-02-16 LAB
ERYTHROCYTE [DISTWIDTH] IN BLOOD BY AUTOMATED COUNT: 14.5 % (ref 11.5–14.5)
HCT VFR BLD AUTO: 27.2 % (ref 36.6–50.3)
HGB BLD-MCNC: 8.8 G/DL (ref 12.1–17)
MCH RBC QN AUTO: 26.5 PG (ref 26–34)
MCHC RBC AUTO-ENTMCNC: 32.4 G/DL (ref 30–36.5)
MCV RBC AUTO: 81.9 FL (ref 80–99)
NRBC # BLD: 0 K/UL (ref 0–0.01)
NRBC BLD-RTO: 0 PER 100 WBC
PLATELET # BLD AUTO: 161 K/UL (ref 150–400)
PMV BLD AUTO: 10.8 FL (ref 8.9–12.9)
RBC # BLD AUTO: 3.32 M/UL (ref 4.1–5.7)
UFH PPP CHRO-ACNC: 0.61 IU/ML
WBC # BLD AUTO: 6.7 K/UL (ref 4.1–11.1)

## 2025-02-16 PROCEDURE — 85520 HEPARIN ASSAY: CPT

## 2025-02-16 PROCEDURE — 2060000000 HC ICU INTERMEDIATE R&B

## 2025-02-16 PROCEDURE — 6370000000 HC RX 637 (ALT 250 FOR IP): Performed by: INTERNAL MEDICINE

## 2025-02-16 PROCEDURE — 2580000003 HC RX 258: Performed by: INTERNAL MEDICINE

## 2025-02-16 PROCEDURE — 2500000003 HC RX 250 WO HCPCS: Performed by: STUDENT IN AN ORGANIZED HEALTH CARE EDUCATION/TRAINING PROGRAM

## 2025-02-16 PROCEDURE — 6360000002 HC RX W HCPCS: Performed by: STUDENT IN AN ORGANIZED HEALTH CARE EDUCATION/TRAINING PROGRAM

## 2025-02-16 PROCEDURE — 36415 COLL VENOUS BLD VENIPUNCTURE: CPT

## 2025-02-16 PROCEDURE — 6370000000 HC RX 637 (ALT 250 FOR IP): Performed by: STUDENT IN AN ORGANIZED HEALTH CARE EDUCATION/TRAINING PROGRAM

## 2025-02-16 PROCEDURE — 85027 COMPLETE CBC AUTOMATED: CPT

## 2025-02-16 RX ADMIN — ASPIRIN 81 MG: 81 TABLET, CHEWABLE ORAL at 08:08

## 2025-02-16 RX ADMIN — SODIUM CHLORIDE, PRESERVATIVE FREE 10 ML: 5 INJECTION INTRAVENOUS at 21:12

## 2025-02-16 RX ADMIN — SODIUM CHLORIDE: 0.9 INJECTION, SOLUTION INTRAVENOUS at 18:56

## 2025-02-16 RX ADMIN — HEPARIN SODIUM 11 UNITS/KG/HR: 10000 INJECTION, SOLUTION INTRAVENOUS at 11:47

## 2025-02-16 RX ADMIN — SODIUM CHLORIDE: 0.9 INJECTION, SOLUTION INTRAVENOUS at 07:27

## 2025-02-16 RX ADMIN — NITROGLYCERIN 1 INCH: 20 OINTMENT TOPICAL at 08:04

## 2025-02-16 RX ADMIN — NITROGLYCERIN 1 INCH: 20 OINTMENT TOPICAL at 17:24

## 2025-02-16 RX ADMIN — NITROGLYCERIN 1 INCH: 20 OINTMENT TOPICAL at 01:28

## 2025-02-16 RX ADMIN — SODIUM CHLORIDE, PRESERVATIVE FREE 10 ML: 5 INJECTION INTRAVENOUS at 08:07

## 2025-02-16 RX ADMIN — NITROGLYCERIN 1 INCH: 20 OINTMENT TOPICAL at 11:42

## 2025-02-16 RX ADMIN — POLYETHYLENE GLYCOL 3350 17 G: 17 POWDER, FOR SOLUTION ORAL at 09:20

## 2025-02-16 RX ADMIN — FERROUS SULFATE TAB 325 MG (65 MG ELEMENTAL FE) 325 MG: 325 (65 FE) TAB at 08:08

## 2025-02-16 RX ADMIN — ATORVASTATIN CALCIUM 40 MG: 40 TABLET, FILM COATED ORAL at 21:11

## 2025-02-16 ASSESSMENT — PAIN SCALES - GENERAL
PAINLEVEL_OUTOF10: 0

## 2025-02-17 ENCOUNTER — APPOINTMENT (OUTPATIENT)
Facility: HOSPITAL | Age: 85
DRG: 234 | End: 2025-02-17
Payer: MEDICARE

## 2025-02-17 PROBLEM — Z01.810 PREOP CARDIOVASCULAR EXAM: Status: ACTIVE | Noted: 2025-02-17

## 2025-02-17 PROBLEM — I65.23 BILATERAL CAROTID ARTERY STENOSIS: Status: ACTIVE | Noted: 2025-02-17

## 2025-02-17 LAB
ALBUMIN SERPL-MCNC: 2.3 G/DL (ref 3.5–5)
ALBUMIN/GLOB SERPL: 0.8 (ref 1.1–2.2)
ALP SERPL-CCNC: 72 U/L (ref 45–117)
ALT SERPL-CCNC: 29 U/L (ref 12–78)
ANION GAP SERPL CALC-SCNC: 4 MMOL/L (ref 2–12)
ANION GAP SERPL CALC-SCNC: 6 MMOL/L (ref 2–12)
APTT PPP: 87.8 SEC (ref 22.1–31)
ARTERIAL PATENCY WRIST A: ABNORMAL
AST SERPL-CCNC: 26 U/L (ref 15–37)
BASE DEFICIT BLDA-SCNC: 4.5 MMOL/L
BDY SITE: ABNORMAL
BILIRUB SERPL-MCNC: 0.8 MG/DL (ref 0.2–1)
BUN SERPL-MCNC: 24 MG/DL (ref 6–20)
BUN SERPL-MCNC: 25 MG/DL (ref 6–20)
BUN/CREAT SERPL: 14 (ref 12–20)
BUN/CREAT SERPL: 17 (ref 12–20)
CALCIUM SERPL-MCNC: 8 MG/DL (ref 8.5–10.1)
CALCIUM SERPL-MCNC: 8.6 MG/DL (ref 8.5–10.1)
CHLORIDE SERPL-SCNC: 113 MMOL/L (ref 97–108)
CHLORIDE SERPL-SCNC: 114 MMOL/L (ref 97–108)
CO2 SERPL-SCNC: 22 MMOL/L (ref 21–32)
CO2 SERPL-SCNC: 22 MMOL/L (ref 21–32)
CREAT SERPL-MCNC: 1.48 MG/DL (ref 0.7–1.3)
CREAT SERPL-MCNC: 1.67 MG/DL (ref 0.7–1.3)
ECHO BSA: 1.85 M2
ECHO BSA: 1.85 M2
ERYTHROCYTE [DISTWIDTH] IN BLOOD BY AUTOMATED COUNT: 14.6 % (ref 11.5–14.5)
ERYTHROCYTE [DISTWIDTH] IN BLOOD BY AUTOMATED COUNT: 14.6 % (ref 11.5–14.5)
FIBRINOGEN PPP-MCNC: 278 MG/DL (ref 200–475)
GLOBULIN SER CALC-MCNC: 3 G/DL (ref 2–4)
GLUCOSE SERPL-MCNC: 77 MG/DL (ref 65–100)
GLUCOSE SERPL-MCNC: 84 MG/DL (ref 65–100)
HCO3 BLDA-SCNC: 19 MMOL/L (ref 22–26)
HCT VFR BLD AUTO: 27.9 % (ref 36.6–50.3)
HCT VFR BLD AUTO: 28.2 % (ref 36.6–50.3)
HGB BLD-MCNC: 9.2 G/DL (ref 12.1–17)
HGB BLD-MCNC: 9.4 G/DL (ref 12.1–17)
INR PPP: 1.2 (ref 0.9–1.1)
MAGNESIUM SERPL-MCNC: 1.7 MG/DL (ref 1.6–2.4)
MCH RBC QN AUTO: 26.6 PG (ref 26–34)
MCH RBC QN AUTO: 27.3 PG (ref 26–34)
MCHC RBC AUTO-ENTMCNC: 32.6 G/DL (ref 30–36.5)
MCHC RBC AUTO-ENTMCNC: 33.7 G/DL (ref 30–36.5)
MCV RBC AUTO: 81.1 FL (ref 80–99)
MCV RBC AUTO: 81.5 FL (ref 80–99)
NRBC # BLD: 0 K/UL (ref 0–0.01)
NRBC # BLD: 0 K/UL (ref 0–0.01)
NRBC BLD-RTO: 0 PER 100 WBC
NRBC BLD-RTO: 0 PER 100 WBC
NT PRO BNP: 3627 PG/ML
PCO2 BLDA: 32 MMHG (ref 35–45)
PH BLDA: 7.4 (ref 7.35–7.45)
PLATELET # BLD AUTO: 155 K/UL (ref 150–400)
PLATELET # BLD AUTO: 165 K/UL (ref 150–400)
PMV BLD AUTO: 10.3 FL (ref 8.9–12.9)
PMV BLD AUTO: 10.6 FL (ref 8.9–12.9)
PO2 BLDA: 106 MMHG (ref 80–100)
POTASSIUM SERPL-SCNC: 4.3 MMOL/L (ref 3.5–5.1)
POTASSIUM SERPL-SCNC: 4.5 MMOL/L (ref 3.5–5.1)
PROT SERPL-MCNC: 5.3 G/DL (ref 6.4–8.2)
PROTHROMBIN TIME: 13 SEC (ref 9.2–11.2)
RBC # BLD AUTO: 3.44 M/UL (ref 4.1–5.7)
RBC # BLD AUTO: 3.46 M/UL (ref 4.1–5.7)
SAO2 % BLD: 98 % (ref 92–97)
SAO2% DEVICE SAO2% SENSOR NAME: ABNORMAL
SODIUM SERPL-SCNC: 140 MMOL/L (ref 136–145)
SODIUM SERPL-SCNC: 141 MMOL/L (ref 136–145)
SPECIMEN SITE: ABNORMAL
T4 FREE SERPL-MCNC: 1.4 NG/DL (ref 0.8–1.5)
THERAPEUTIC RANGE: ABNORMAL SECS (ref 58–77)
TSH SERPL DL<=0.05 MIU/L-ACNC: 3.7 UIU/ML (ref 0.36–3.74)
UFH PPP CHRO-ACNC: 0.3 IU/ML
UFH PPP CHRO-ACNC: 0.55 IU/ML
VAS LEFT CCA DIST EDV: 7.6 CM/S
VAS LEFT CCA DIST PSV: 70.2 CM/S
VAS LEFT CCA PROX EDV: 4.7 CM/S
VAS LEFT CCA PROX PSV: 83.8 CM/S
VAS LEFT ECA EDV: 0 CM/S
VAS LEFT ECA PSV: 172.4 CM/S
VAS LEFT ICA DIST EDV: 12.8 CM/S
VAS LEFT ICA DIST PSV: 74.2 CM/S
VAS LEFT ICA MID EDV: 18.4 CM/S
VAS LEFT ICA MID PSV: 115.9 CM/S
VAS LEFT ICA PROX EDV: 15.3 CM/S
VAS LEFT ICA PROX PSV: 189.6 CM/S
VAS LEFT ICA/CCA PSV: 2.7 NO UNITS
VAS LEFT SUBCLAVIAN PROX EDV: 0 CM/S
VAS LEFT SUBCLAVIAN PROX PSV: 122.9 CM/S
VAS LEFT VERTEBRAL EDV: 5.44 CM/S
VAS LEFT VERTEBRAL PSV: 53.3 CM/S
VAS RIGHT CCA DIST EDV: 9.7 CM/S
VAS RIGHT CCA DIST PSV: 88.5 CM/S
VAS RIGHT CCA PROX EDV: 17.5 CM/S
VAS RIGHT CCA PROX PSV: 88.5 CM/S
VAS RIGHT ECA EDV: 0 CM/S
VAS RIGHT ECA PSV: 64.6 CM/S
VAS RIGHT ICA DIST EDV: 27.6 CM/S
VAS RIGHT ICA DIST PSV: 129.8 CM/S
VAS RIGHT ICA MID EDV: 14.1 CM/S
VAS RIGHT ICA MID PSV: 72.3 CM/S
VAS RIGHT ICA PROX EDV: 11.5 CM/S
VAS RIGHT ICA PROX PSV: 68.5 CM/S
VAS RIGHT ICA/CCA PSV: 1.5 NO UNITS
VAS RIGHT SUBCLAVIAN PROX EDV: 0 CM/S
VAS RIGHT SUBCLAVIAN PROX PSV: 208.6 CM/S
VAS RIGHT VERTEBRAL EDV: 7.76 CM/S
VAS RIGHT VERTEBRAL PSV: 52.3 CM/S
WBC # BLD AUTO: 6.4 K/UL (ref 4.1–11.1)
WBC # BLD AUTO: 6.5 K/UL (ref 4.1–11.1)

## 2025-02-17 PROCEDURE — 94010 BREATHING CAPACITY TEST: CPT

## 2025-02-17 PROCEDURE — 85610 PROTHROMBIN TIME: CPT

## 2025-02-17 PROCEDURE — C1894 INTRO/SHEATH, NON-LASER: HCPCS | Performed by: INTERNAL MEDICINE

## 2025-02-17 PROCEDURE — 6370000000 HC RX 637 (ALT 250 FOR IP): Performed by: STUDENT IN AN ORGANIZED HEALTH CARE EDUCATION/TRAINING PROGRAM

## 2025-02-17 PROCEDURE — 6370000000 HC RX 637 (ALT 250 FOR IP): Performed by: NURSE PRACTITIONER

## 2025-02-17 PROCEDURE — 2709999900 HC NON-CHARGEABLE SUPPLY: Performed by: INTERNAL MEDICINE

## 2025-02-17 PROCEDURE — 84443 ASSAY THYROID STIM HORMONE: CPT

## 2025-02-17 PROCEDURE — 85027 COMPLETE CBC AUTOMATED: CPT

## 2025-02-17 PROCEDURE — C1769 GUIDE WIRE: HCPCS | Performed by: INTERNAL MEDICINE

## 2025-02-17 PROCEDURE — 93970 EXTREMITY STUDY: CPT

## 2025-02-17 PROCEDURE — 85730 THROMBOPLASTIN TIME PARTIAL: CPT

## 2025-02-17 PROCEDURE — 6360000002 HC RX W HCPCS: Performed by: INTERNAL MEDICINE

## 2025-02-17 PROCEDURE — 99152 MOD SED SAME PHYS/QHP 5/>YRS: CPT | Performed by: INTERNAL MEDICINE

## 2025-02-17 PROCEDURE — 71046 X-RAY EXAM CHEST 2 VIEWS: CPT

## 2025-02-17 PROCEDURE — 93458 L HRT ARTERY/VENTRICLE ANGIO: CPT | Performed by: INTERNAL MEDICINE

## 2025-02-17 PROCEDURE — 86901 BLOOD TYPING SEROLOGIC RH(D): CPT

## 2025-02-17 PROCEDURE — 93880 EXTRACRANIAL BILAT STUDY: CPT | Performed by: PSYCHIATRY & NEUROLOGY

## 2025-02-17 PROCEDURE — 86850 RBC ANTIBODY SCREEN: CPT

## 2025-02-17 PROCEDURE — 36600 WITHDRAWAL OF ARTERIAL BLOOD: CPT

## 2025-02-17 PROCEDURE — 99153 MOD SED SAME PHYS/QHP EA: CPT | Performed by: INTERNAL MEDICINE

## 2025-02-17 PROCEDURE — 84439 ASSAY OF FREE THYROXINE: CPT

## 2025-02-17 PROCEDURE — 80053 COMPREHEN METABOLIC PANEL: CPT

## 2025-02-17 PROCEDURE — 2580000003 HC RX 258: Performed by: INTERNAL MEDICINE

## 2025-02-17 PROCEDURE — B2111ZZ FLUOROSCOPY OF MULTIPLE CORONARY ARTERIES USING LOW OSMOLAR CONTRAST: ICD-10-PCS | Performed by: INTERNAL MEDICINE

## 2025-02-17 PROCEDURE — 76937 US GUIDE VASCULAR ACCESS: CPT | Performed by: INTERNAL MEDICINE

## 2025-02-17 PROCEDURE — 6360000004 HC RX CONTRAST MEDICATION: Performed by: INTERNAL MEDICINE

## 2025-02-17 PROCEDURE — 2060000000 HC ICU INTERMEDIATE R&B

## 2025-02-17 PROCEDURE — 85384 FIBRINOGEN ACTIVITY: CPT

## 2025-02-17 PROCEDURE — 85520 HEPARIN ASSAY: CPT

## 2025-02-17 PROCEDURE — 4A023N7 MEASUREMENT OF CARDIAC SAMPLING AND PRESSURE, LEFT HEART, PERCUTANEOUS APPROACH: ICD-10-PCS | Performed by: INTERNAL MEDICINE

## 2025-02-17 PROCEDURE — 94729 DIFFUSING CAPACITY: CPT

## 2025-02-17 PROCEDURE — 82803 BLOOD GASES ANY COMBINATION: CPT

## 2025-02-17 PROCEDURE — 6360000002 HC RX W HCPCS: Performed by: STUDENT IN AN ORGANIZED HEALTH CARE EDUCATION/TRAINING PROGRAM

## 2025-02-17 PROCEDURE — 2500000003 HC RX 250 WO HCPCS: Performed by: INTERNAL MEDICINE

## 2025-02-17 PROCEDURE — 83880 ASSAY OF NATRIURETIC PEPTIDE: CPT

## 2025-02-17 PROCEDURE — 6370000000 HC RX 637 (ALT 250 FOR IP): Performed by: INTERNAL MEDICINE

## 2025-02-17 PROCEDURE — 93880 EXTRACRANIAL BILAT STUDY: CPT

## 2025-02-17 PROCEDURE — 86900 BLOOD TYPING SEROLOGIC ABO: CPT

## 2025-02-17 PROCEDURE — 83735 ASSAY OF MAGNESIUM: CPT

## 2025-02-17 PROCEDURE — 36415 COLL VENOUS BLD VENIPUNCTURE: CPT

## 2025-02-17 RX ORDER — SENNA AND DOCUSATE SODIUM 50; 8.6 MG/1; MG/1
2 TABLET, FILM COATED ORAL DAILY
Status: DISCONTINUED | OUTPATIENT
Start: 2025-02-17 | End: 2025-02-21

## 2025-02-17 RX ORDER — 0.9 % SODIUM CHLORIDE 0.9 %
INTRAVENOUS SOLUTION INTRAVENOUS CONTINUOUS PRN
Status: COMPLETED | OUTPATIENT
Start: 2025-02-17 | End: 2025-02-17

## 2025-02-17 RX ORDER — VERAPAMIL HYDROCHLORIDE 2.5 MG/ML
INJECTION, SOLUTION INTRAVENOUS PRN
Status: DISCONTINUED | OUTPATIENT
Start: 2025-02-17 | End: 2025-02-17 | Stop reason: HOSPADM

## 2025-02-17 RX ORDER — SENNA AND DOCUSATE SODIUM 50; 8.6 MG/1; MG/1
2 TABLET, FILM COATED ORAL DAILY PRN
Status: DISCONTINUED | OUTPATIENT
Start: 2025-02-17 | End: 2025-02-17

## 2025-02-17 RX ORDER — BISACODYL 10 MG
10 SUPPOSITORY, RECTAL RECTAL DAILY PRN
Status: DISCONTINUED | OUTPATIENT
Start: 2025-02-17 | End: 2025-02-21

## 2025-02-17 RX ORDER — FENTANYL CITRATE 50 UG/ML
INJECTION, SOLUTION INTRAMUSCULAR; INTRAVENOUS PRN
Status: DISCONTINUED | OUTPATIENT
Start: 2025-02-17 | End: 2025-02-17 | Stop reason: HOSPADM

## 2025-02-17 RX ORDER — MUPIROCIN 20 MG/G
OINTMENT TOPICAL 2 TIMES DAILY
Status: DISCONTINUED | OUTPATIENT
Start: 2025-02-17 | End: 2025-02-21

## 2025-02-17 RX ORDER — POLYETHYLENE GLYCOL 3350 17 G/17G
17 POWDER, FOR SOLUTION ORAL DAILY
Status: DISCONTINUED | OUTPATIENT
Start: 2025-02-17 | End: 2025-02-21

## 2025-02-17 RX ORDER — LIDOCAINE HYDROCHLORIDE 10 MG/ML
INJECTION, SOLUTION INFILTRATION; PERINEURAL PRN
Status: DISCONTINUED | OUTPATIENT
Start: 2025-02-17 | End: 2025-02-17 | Stop reason: HOSPADM

## 2025-02-17 RX ORDER — CHLORHEXIDINE GLUCONATE ORAL RINSE 1.2 MG/ML
15 SOLUTION DENTAL 2 TIMES DAILY
Status: DISCONTINUED | OUTPATIENT
Start: 2025-02-17 | End: 2025-02-21

## 2025-02-17 RX ORDER — IOPAMIDOL 755 MG/ML
INJECTION, SOLUTION INTRAVASCULAR PRN
Status: DISCONTINUED | OUTPATIENT
Start: 2025-02-17 | End: 2025-02-17 | Stop reason: HOSPADM

## 2025-02-17 RX ORDER — HEPARIN SODIUM 1000 [USP'U]/ML
INJECTION, SOLUTION INTRAVENOUS; SUBCUTANEOUS PRN
Status: DISCONTINUED | OUTPATIENT
Start: 2025-02-17 | End: 2025-02-17 | Stop reason: HOSPADM

## 2025-02-17 RX ADMIN — MUPIROCIN: 20 OINTMENT TOPICAL at 20:48

## 2025-02-17 RX ADMIN — NITROGLYCERIN 1 INCH: 20 OINTMENT TOPICAL at 12:06

## 2025-02-17 RX ADMIN — ASPIRIN 81 MG: 81 TABLET, CHEWABLE ORAL at 07:35

## 2025-02-17 RX ADMIN — 0.12% CHLORHEXIDINE GLUCONATE 15 ML: 1.2 RINSE ORAL at 20:48

## 2025-02-17 RX ADMIN — ATORVASTATIN CALCIUM 40 MG: 40 TABLET, FILM COATED ORAL at 20:49

## 2025-02-17 RX ADMIN — SENNOSIDES AND DOCUSATE SODIUM 2 TABLET: 50; 8.6 TABLET ORAL at 17:56

## 2025-02-17 RX ADMIN — NITROGLYCERIN 1 INCH: 20 OINTMENT TOPICAL at 04:47

## 2025-02-17 RX ADMIN — NITROGLYCERIN 1 INCH: 20 OINTMENT TOPICAL at 17:56

## 2025-02-17 RX ADMIN — HEPARIN SODIUM 11 UNITS/KG/HR: 10000 INJECTION, SOLUTION INTRAVENOUS at 16:00

## 2025-02-17 RX ADMIN — NITROGLYCERIN 1 INCH: 20 OINTMENT TOPICAL at 00:26

## 2025-02-17 RX ADMIN — POLYETHYLENE GLYCOL 3350 17 G: 17 POWDER, FOR SOLUTION ORAL at 17:56

## 2025-02-17 ASSESSMENT — PAIN - FUNCTIONAL ASSESSMENT
PAIN_FUNCTIONAL_ASSESSMENT: ACTIVITIES ARE NOT PREVENTED
PAIN_FUNCTIONAL_ASSESSMENT: ACTIVITIES ARE NOT PREVENTED

## 2025-02-17 ASSESSMENT — PAIN SCALES - GENERAL
PAINLEVEL_OUTOF10: 0
PAINLEVEL_OUTOF10: 0

## 2025-02-17 NOTE — BRIEF OP NOTE
Brief Postoperative Note      Patient: Alexandr Lewis  YOB: 1940  MRN: [id number]    Date of Procedure: 2/17/2025    Pre-Op Diagnosis Codes:      * NSTEMI (non-ST elevated myocardial infarction) (HCC) [I21.4]    Post-Op Diagnosis:  Heavily calcified 3v CAD, nml LVEDP       Procedure(s):  Left heart cath / coronary angiography  Ultrasound guided vascular access    Surgeon(s):  Mateo Calvin III, DO    Assistant:  * No surgical staff found *    Anesthesia: IV Sedation    Estimated Blood Loss (mL): Minimal    Complications: None    Specimens:   * No specimens in log *    Implants:  * No implants in log *      Drains:   [REMOVED] NG/OG/NJ/NE Tube Nasogastric 16 fr Left nostril (Removed)   Surrounding Skin Clean, dry & intact 02/06/25 0845   Securement device Tape 02/06/25 0845   Status Suction-low intermittent 02/06/25 0845   Placement Verified X-Ray (Initial) 02/06/25 0845   Drainage Appearance Clear 02/06/25 0845       Findings:  Infection Present At Time Of Surgery (PATOS) (choose all levels that have infection present):  No infection present  Other Findings: as above    Electronically signed by Mateo Calvin DO on 2/17/2025 at 8:53 AM

## 2025-02-17 NOTE — CONSULTS
Cardiac Surgery   History and Physical    Subjective:      Chief complaint: Chest pain    Alexandr Lewis is a 84 y.o. male who was referred for cardiac evaluation from Dr Calvin for consideration of CABG surgery. PMHx significant for SBO, inguinal hernia status post mesh repair 7/24, CKD stage III, hypertension, hyperlipidemia.    Patient presented to the emergency department 2/14/25 with complaints of acute onset chest pressure/burning that is substernal in location without radiation. Patient reports no exacerbating or remitting factors. Initially thought it was reflux and took Tums without relief. Reports he has been experiencing intermittent episodes for the last 2 weeks mostly with straining while having a BM but none this severe or persistent. The patient was recently in the hospital for ventral hernia with SBO requiring NG tube placement-denies nausea/vomiting or abdominal pain on presentation today. Former smoker. Denies alcohol or illicit drug use. Reports he is compliant with his medications. Denies history of ASCVD-has seen Dr. Calvin in the past for NSTEMI evaluation in setting of mesenteric ischemia. Underwent Saundra stress 7/29/2024 which was unremarkable. Pt ultimately had a cardiac cath by Dr Calvin on 2/17/25 with result below. Cardiac Surgery is consulted for consideration of CABG surgery.      Cardiac Testing    Cardiac catheterization:  Hemodynamics:   Ao: 85/47/65  LV: 85/5     Cors:   Dominance: [x] Right  [] Left  [] Mixed     LM: Large caliber vessel without significant stenosis     LAD: Large caliber calcified vessel that wraps around the apex with an ostial 95% and mid 80% stenosis with SYDNEE 1 flow  D1: Small caliber vessel without significant stenosis.    D2: Small caliber vessel without significant stenosis.      LCX: Large caliber calcified vessel with a proximal 80% stenosis  OM1: Small caliber vessel without significant stenosis.   OM2: Moderate caliber vessel without significant

## 2025-02-17 NOTE — CARDIO/PULMONARY
Chart reviewed: Patient is 84 y.o. male admitted with Internal hernia [K45.8]  Acute coronary syndrome (HCC) [I24.9]  NSTEMI (non-ST elevated myocardial infarction) (HCC) [I21.4]    Cardiac cath 2/17/25: CTS consult     CP Rehab following.

## 2025-02-17 NOTE — CARE COORDINATION
Transition of Care Plan:    RUR: 18% \"medium risk\"  Prior Level of Functioning: Independent with ADL's  Disposition: Home with family support   STEPHANIE: 2/19  If SNF or IPR: Date FOC offered: N/A  Follow up appointments: PCP/Specialists as indicated  DME needed: None - pt has a cane and walker at home   Transportation at discharge: Family to transport   IM/IMM Medicare/ letter given: 2nd IM needed prior to d/c   Is patient a  and connected with VA? No  Caregiver Contact: Alexandra Haney (niece), [phone]  Discharge Caregiver contacted prior to discharge? To be contacted  Care Conference needed? Not at this time   Barriers to discharge: Cardiology, CTS     1228 PM: Chart reviewed. CM following for d/c planning. Pt to return home upon medical clearance. Pt niece or son to transport pt home. CM to continue to follow should CM needs arise.     MEGHNA Ingram  Care Management  [facility]   [phone]

## 2025-02-18 ENCOUNTER — APPOINTMENT (OUTPATIENT)
Facility: HOSPITAL | Age: 85
DRG: 234 | End: 2025-02-18
Payer: MEDICARE

## 2025-02-18 LAB
ANION GAP SERPL CALC-SCNC: 6 MMOL/L (ref 2–12)
APPEARANCE UR: CLEAR
BACTERIA URNS QL MICRO: NEGATIVE /HPF
BILIRUB UR QL: NEGATIVE
BUN SERPL-MCNC: 24 MG/DL (ref 6–20)
BUN/CREAT SERPL: 13 (ref 12–20)
CALCIUM SERPL-MCNC: 8.8 MG/DL (ref 8.5–10.1)
CHLORIDE SERPL-SCNC: 114 MMOL/L (ref 97–108)
CO2 SERPL-SCNC: 21 MMOL/L (ref 21–32)
COLOR UR: NORMAL
CREAT SERPL-MCNC: 1.81 MG/DL (ref 0.7–1.3)
ECHO AV AREA PEAK VELOCITY: 2.7 CM2
ECHO AV AREA PEAK VELOCITY: 2.7 CM2
ECHO AV AREA PEAK VELOCITY: 2.8 CM2
ECHO AV AREA PEAK VELOCITY: 2.8 CM2
ECHO AV AREA VTI: 2.5 CM2
ECHO AV AREA/BSA VTI: 1.4 CM2/M2
ECHO AV MEAN GRADIENT: 3 MMHG
ECHO AV MEAN VELOCITY: 0.8 M/S
ECHO AV PEAK GRADIENT: 5 MMHG
ECHO AV PEAK GRADIENT: 6 MMHG
ECHO AV PEAK VELOCITY: 1.2 M/S
ECHO AV PEAK VELOCITY: 1.2 M/S
ECHO AV VTI: 23.8 CM
ECHO BSA: 1.85 M2
ECHO LA VOL A-L A2C: 43 ML (ref 18–58)
ECHO LA VOL A-L A4C: 38 ML (ref 18–58)
ECHO LA VOL MOD A2C: 38 ML (ref 18–58)
ECHO LA VOL MOD A4C: 34 ML (ref 18–58)
ECHO LA VOLUME AREA LENGTH: 43 ML
ECHO LA VOLUME INDEX A-L A2C: 23 ML/M2 (ref 16–34)
ECHO LA VOLUME INDEX A-L A4C: 21 ML/M2 (ref 16–34)
ECHO LA VOLUME INDEX AREA LENGTH: 23 ML/M2 (ref 16–34)
ECHO LA VOLUME INDEX MOD A2C: 21 ML/M2 (ref 16–34)
ECHO LA VOLUME INDEX MOD A4C: 18 ML/M2 (ref 16–34)
ECHO LV E' LATERAL VELOCITY: 6.57 CM/S
ECHO LV E' SEPTAL VELOCITY: 5.85 CM/S
ECHO LV EF PHYSICIAN: 45 %
ECHO LVOT AREA: 3.1 CM2
ECHO LVOT AV VTI INDEX: 0.79
ECHO LVOT DIAM: 2 CM
ECHO LVOT MEAN GRADIENT: 1 MMHG
ECHO LVOT PEAK GRADIENT: 4 MMHG
ECHO LVOT PEAK GRADIENT: 4 MMHG
ECHO LVOT PEAK VELOCITY: 1 M/S
ECHO LVOT PEAK VELOCITY: 1 M/S
ECHO LVOT STROKE VOLUME INDEX: 32.1 ML/M2
ECHO LVOT SV: 59 ML
ECHO LVOT VTI: 18.8 CM
ECHO MV A VELOCITY: 0.69 M/S
ECHO MV AREA VTI: 2.7 CM2
ECHO MV E DECELERATION TIME (DT): 294.5 MS
ECHO MV E VELOCITY: 0.47 M/S
ECHO MV E/A RATIO: 0.68
ECHO MV E/E' LATERAL: 7.15
ECHO MV E/E' RATIO (AVERAGED): 7.59
ECHO MV E/E' SEPTAL: 8.03
ECHO MV LVOT VTI INDEX: 1.17
ECHO MV MAX VELOCITY: 0.8 M/S
ECHO MV MEAN GRADIENT: 1 MMHG
ECHO MV MEAN VELOCITY: 0.4 M/S
ECHO MV PEAK GRADIENT: 3 MMHG
ECHO MV VTI: 22 CM
ECHO RV FREE WALL PEAK S': 9.6 CM/S
ECHO RV INTERNAL DIMENSION: 4.2 CM
EPITH CASTS URNS QL MICRO: NORMAL /LPF
ERYTHROCYTE [DISTWIDTH] IN BLOOD BY AUTOMATED COUNT: 14.6 % (ref 11.5–14.5)
GLUCOSE SERPL-MCNC: 103 MG/DL (ref 65–100)
GLUCOSE UR STRIP.AUTO-MCNC: NEGATIVE MG/DL
HCT VFR BLD AUTO: 27.3 % (ref 36.6–50.3)
HGB BLD-MCNC: 9 G/DL (ref 12.1–17)
HGB UR QL STRIP: NEGATIVE
HYALINE CASTS URNS QL MICRO: NORMAL /LPF (ref 0–2)
KETONES UR QL STRIP.AUTO: NEGATIVE MG/DL
LEUKOCYTE ESTERASE UR QL STRIP.AUTO: NEGATIVE
MCH RBC QN AUTO: 26.8 PG (ref 26–34)
MCHC RBC AUTO-ENTMCNC: 33 G/DL (ref 30–36.5)
MCV RBC AUTO: 81.3 FL (ref 80–99)
NITRITE UR QL STRIP.AUTO: NEGATIVE
NRBC # BLD: 0 K/UL (ref 0–0.01)
NRBC BLD-RTO: 0 PER 100 WBC
PH UR STRIP: 6.5 (ref 5–8)
PLATELET # BLD AUTO: 151 K/UL (ref 150–400)
PMV BLD AUTO: 11.4 FL (ref 8.9–12.9)
POTASSIUM SERPL-SCNC: 4.5 MMOL/L (ref 3.5–5.1)
PROT UR STRIP-MCNC: NEGATIVE MG/DL
RBC # BLD AUTO: 3.36 M/UL (ref 4.1–5.7)
RBC #/AREA URNS HPF: NORMAL /HPF (ref 0–5)
SODIUM SERPL-SCNC: 141 MMOL/L (ref 136–145)
SP GR UR REFRACTOMETRY: 1.02
UFH PPP CHRO-ACNC: 0.4 IU/ML
URINE CULTURE IF INDICATED: NORMAL
UROBILINOGEN UR QL STRIP.AUTO: 1 EU/DL (ref 0.2–1)
WBC # BLD AUTO: 6.6 K/UL (ref 4.1–11.1)
WBC URNS QL MICRO: NORMAL /HPF (ref 0–4)

## 2025-02-18 PROCEDURE — 6370000000 HC RX 637 (ALT 250 FOR IP): Performed by: STUDENT IN AN ORGANIZED HEALTH CARE EDUCATION/TRAINING PROGRAM

## 2025-02-18 PROCEDURE — 2500000003 HC RX 250 WO HCPCS: Performed by: STUDENT IN AN ORGANIZED HEALTH CARE EDUCATION/TRAINING PROGRAM

## 2025-02-18 PROCEDURE — 85520 HEPARIN ASSAY: CPT

## 2025-02-18 PROCEDURE — 93306 TTE W/DOPPLER COMPLETE: CPT

## 2025-02-18 PROCEDURE — 80048 BASIC METABOLIC PNL TOTAL CA: CPT

## 2025-02-18 PROCEDURE — 97116 GAIT TRAINING THERAPY: CPT

## 2025-02-18 PROCEDURE — 6370000000 HC RX 637 (ALT 250 FOR IP): Performed by: INTERNAL MEDICINE

## 2025-02-18 PROCEDURE — 36415 COLL VENOUS BLD VENIPUNCTURE: CPT

## 2025-02-18 PROCEDURE — 93922 UPR/L XTREMITY ART 2 LEVELS: CPT

## 2025-02-18 PROCEDURE — 2060000000 HC ICU INTERMEDIATE R&B

## 2025-02-18 PROCEDURE — 85027 COMPLETE CBC AUTOMATED: CPT

## 2025-02-18 PROCEDURE — 6370000000 HC RX 637 (ALT 250 FOR IP): Performed by: NURSE PRACTITIONER

## 2025-02-18 PROCEDURE — 97161 PT EVAL LOW COMPLEX 20 MIN: CPT

## 2025-02-18 PROCEDURE — 6360000002 HC RX W HCPCS: Performed by: STUDENT IN AN ORGANIZED HEALTH CARE EDUCATION/TRAINING PROGRAM

## 2025-02-18 PROCEDURE — 81001 URINALYSIS AUTO W/SCOPE: CPT

## 2025-02-18 RX ORDER — ACETAMINOPHEN 325 MG/1
650 TABLET ORAL EVERY 4 HOURS PRN
Status: DISCONTINUED | OUTPATIENT
Start: 2025-02-18 | End: 2025-02-21

## 2025-02-18 RX ADMIN — NITROGLYCERIN 1 INCH: 20 OINTMENT TOPICAL at 18:49

## 2025-02-18 RX ADMIN — NITROGLYCERIN 1 INCH: 20 OINTMENT TOPICAL at 14:19

## 2025-02-18 RX ADMIN — POLYETHYLENE GLYCOL 3350 17 G: 17 POWDER, FOR SOLUTION ORAL at 09:20

## 2025-02-18 RX ADMIN — ATORVASTATIN CALCIUM 40 MG: 40 TABLET, FILM COATED ORAL at 20:25

## 2025-02-18 RX ADMIN — ASPIRIN 81 MG: 81 TABLET, CHEWABLE ORAL at 09:20

## 2025-02-18 RX ADMIN — MUPIROCIN: 20 OINTMENT TOPICAL at 09:24

## 2025-02-18 RX ADMIN — 0.12% CHLORHEXIDINE GLUCONATE 15 ML: 1.2 RINSE ORAL at 20:25

## 2025-02-18 RX ADMIN — MUPIROCIN: 20 OINTMENT TOPICAL at 20:25

## 2025-02-18 RX ADMIN — 0.12% CHLORHEXIDINE GLUCONATE 15 ML: 1.2 RINSE ORAL at 09:28

## 2025-02-18 RX ADMIN — METOPROLOL SUCCINATE 12.5 MG: 25 TABLET, EXTENDED RELEASE ORAL at 09:20

## 2025-02-18 RX ADMIN — SENNOSIDES AND DOCUSATE SODIUM 2 TABLET: 50; 8.6 TABLET ORAL at 09:20

## 2025-02-18 RX ADMIN — SODIUM CHLORIDE, PRESERVATIVE FREE 10 ML: 5 INJECTION INTRAVENOUS at 09:23

## 2025-02-18 RX ADMIN — SODIUM CHLORIDE, PRESERVATIVE FREE 10 ML: 5 INJECTION INTRAVENOUS at 20:25

## 2025-02-18 RX ADMIN — HEPARIN SODIUM 11 UNITS/KG/HR: 10000 INJECTION, SOLUTION INTRAVENOUS at 00:59

## 2025-02-18 RX ADMIN — NITROGLYCERIN 1 INCH: 20 OINTMENT TOPICAL at 23:56

## 2025-02-18 RX ADMIN — FERROUS SULFATE TAB 325 MG (65 MG ELEMENTAL FE) 325 MG: 325 (65 FE) TAB at 09:20

## 2025-02-18 ASSESSMENT — PAIN SCALES - GENERAL
PAINLEVEL_OUTOF10: 0
PAINLEVEL_OUTOF10: 0
PAINLEVEL_OUTOF10: 2
PAINLEVEL_OUTOF10: 0

## 2025-02-18 ASSESSMENT — PAIN DESCRIPTION - DESCRIPTORS: DESCRIPTORS: BURNING

## 2025-02-18 ASSESSMENT — PAIN - FUNCTIONAL ASSESSMENT: PAIN_FUNCTIONAL_ASSESSMENT: PREVENTS OR INTERFERES SOME ACTIVE ACTIVITIES AND ADLS

## 2025-02-18 ASSESSMENT — PAIN DESCRIPTION - LOCATION: LOCATION: CHEST

## 2025-02-19 LAB
ECHO BSA: 1.85 M2
ECHO BSA: 1.85 M2
ERYTHROCYTE [DISTWIDTH] IN BLOOD BY AUTOMATED COUNT: 14.9 % (ref 11.5–14.5)
HCT VFR BLD AUTO: 27.5 % (ref 36.6–50.3)
HGB BLD-MCNC: 9 G/DL (ref 12.1–17)
MCH RBC QN AUTO: 26.9 PG (ref 26–34)
MCHC RBC AUTO-ENTMCNC: 32.7 G/DL (ref 30–36.5)
MCV RBC AUTO: 82.1 FL (ref 80–99)
NRBC # BLD: 0 K/UL (ref 0–0.01)
NRBC BLD-RTO: 0 PER 100 WBC
PLATELET # BLD AUTO: 160 K/UL (ref 150–400)
PMV BLD AUTO: 10.7 FL (ref 8.9–12.9)
RBC # BLD AUTO: 3.35 M/UL (ref 4.1–5.7)
UFH PPP CHRO-ACNC: 0.43 IU/ML
VAS LEFT ARM BP: 106 MMHG
VAS LEFT DORSALIS PEDIS BP: 255 MMHG
VAS LEFT GSV ANKLE DIAM: 1.4 MM
VAS LEFT GSV AT KNEE DIAM: 1.5 MM
VAS LEFT GSV BK MID DIAM: 1.3 MM
VAS LEFT GSV BK PROX DIAM: 1.8 MM
VAS LEFT GSV THIGH DIST DIAM: 2.2 MM
VAS LEFT GSV THIGH MID DIAM: 1.6 MM
VAS LEFT GSV THIGH PROX DIAM: 1.9 MM
VAS LEFT PTA BP: 255 MMHG
VAS LEFT TBI: 0.48
VAS LEFT TOE PRESSURE: 51 MMHG
VAS RIGHT DORSALIS PEDIS BP: 255 MMHG
VAS RIGHT GSV ANKLE DIAM: 0.8 MM
VAS RIGHT GSV AT KNEE DIAM: 1.6 MM
VAS RIGHT GSV BK MID DIAM: 0.9 MM
VAS RIGHT GSV BK PROX DIAM: 0.7 MM
VAS RIGHT GSV THIGH DIST DIAM: 1.3 MM
VAS RIGHT GSV THIGH MID DIAM: 1.4 MM
VAS RIGHT GSV THIGH PROX DIAM: 5.3 MM
VAS RIGHT PTA BP: 255 MMHG
VAS RIGHT TBI: 0.48
VAS RIGHT TOE PRESSURE: 51 MMHG
WBC # BLD AUTO: 6.9 K/UL (ref 4.1–11.1)

## 2025-02-19 PROCEDURE — 2500000003 HC RX 250 WO HCPCS: Performed by: STUDENT IN AN ORGANIZED HEALTH CARE EDUCATION/TRAINING PROGRAM

## 2025-02-19 PROCEDURE — 6370000000 HC RX 637 (ALT 250 FOR IP): Performed by: STUDENT IN AN ORGANIZED HEALTH CARE EDUCATION/TRAINING PROGRAM

## 2025-02-19 PROCEDURE — 85027 COMPLETE CBC AUTOMATED: CPT

## 2025-02-19 PROCEDURE — 6370000000 HC RX 637 (ALT 250 FOR IP): Performed by: INTERNAL MEDICINE

## 2025-02-19 PROCEDURE — 6370000000 HC RX 637 (ALT 250 FOR IP): Performed by: NURSE PRACTITIONER

## 2025-02-19 PROCEDURE — 36415 COLL VENOUS BLD VENIPUNCTURE: CPT

## 2025-02-19 PROCEDURE — 6360000002 HC RX W HCPCS: Performed by: STUDENT IN AN ORGANIZED HEALTH CARE EDUCATION/TRAINING PROGRAM

## 2025-02-19 PROCEDURE — 2060000000 HC ICU INTERMEDIATE R&B

## 2025-02-19 PROCEDURE — 85520 HEPARIN ASSAY: CPT

## 2025-02-19 RX ORDER — AMIODARONE HYDROCHLORIDE 200 MG/1
400 TABLET ORAL 2 TIMES DAILY
Status: CANCELLED | OUTPATIENT
Start: 2025-02-19

## 2025-02-19 RX ORDER — GABAPENTIN 300 MG/1
300 CAPSULE ORAL ONCE
Status: CANCELLED | OUTPATIENT
Start: 2025-02-19 | End: 2025-02-19

## 2025-02-19 RX ORDER — ACETAMINOPHEN 500 MG
1000 TABLET ORAL ONCE
Status: CANCELLED | OUTPATIENT
Start: 2025-02-19 | End: 2025-02-19

## 2025-02-19 RX ADMIN — FERROUS SULFATE TAB 325 MG (65 MG ELEMENTAL FE) 325 MG: 325 (65 FE) TAB at 10:30

## 2025-02-19 RX ADMIN — NITROGLYCERIN 1 INCH: 20 OINTMENT TOPICAL at 10:23

## 2025-02-19 RX ADMIN — 0.12% CHLORHEXIDINE GLUCONATE 15 ML: 1.2 RINSE ORAL at 10:30

## 2025-02-19 RX ADMIN — METOPROLOL SUCCINATE 12.5 MG: 25 TABLET, EXTENDED RELEASE ORAL at 10:30

## 2025-02-19 RX ADMIN — ATORVASTATIN CALCIUM 40 MG: 40 TABLET, FILM COATED ORAL at 21:26

## 2025-02-19 RX ADMIN — SODIUM CHLORIDE, PRESERVATIVE FREE 10 ML: 5 INJECTION INTRAVENOUS at 21:26

## 2025-02-19 RX ADMIN — POLYETHYLENE GLYCOL 3350 17 G: 17 POWDER, FOR SOLUTION ORAL at 10:30

## 2025-02-19 RX ADMIN — NITROGLYCERIN 1 INCH: 20 OINTMENT TOPICAL at 06:32

## 2025-02-19 RX ADMIN — SENNOSIDES AND DOCUSATE SODIUM 2 TABLET: 50; 8.6 TABLET ORAL at 10:22

## 2025-02-19 RX ADMIN — ASPIRIN 81 MG: 81 TABLET, CHEWABLE ORAL at 10:23

## 2025-02-19 RX ADMIN — MUPIROCIN: 20 OINTMENT TOPICAL at 10:33

## 2025-02-19 RX ADMIN — HEPARIN SODIUM 11 UNITS/KG/HR: 10000 INJECTION, SOLUTION INTRAVENOUS at 10:40

## 2025-02-19 RX ADMIN — NITROGLYCERIN 1 INCH: 20 OINTMENT TOPICAL at 18:25

## 2025-02-19 ASSESSMENT — PAIN SCALES - GENERAL
PAINLEVEL_OUTOF10: 0

## 2025-02-19 NOTE — CARE COORDINATION
Transition of Care Plan:    RUR: 18% \"medium risk\"  Prior Level of Functioning: Independent with ADL's  Disposition: Home with family support   STEPHANIE: 2/24  If SNF or IPR: Date FOC offered: N/A  Follow up appointments: PCP/Specialists as indicated  DME needed: None - pt has a cane and walker at home   Transportation at discharge: Family to transport   IM/IMM Medicare/ letter given: 2nd IM needed prior to d/c   Is patient a  and connected with VA? No  Caregiver Contact: Alexandra Haney (niece), [phone]  Discharge Caregiver contacted prior to discharge? To be contacted  Care Conference needed? Not at this time   Barriers to discharge: CTS     1147 AM: Chart reviewed. CM following for d/c planning. Pt undergoing CABG work up. CM to continue to follow.    MEGHNA Ingram  Care Management  [facility]   [phone]

## 2025-02-20 ENCOUNTER — PREP FOR PROCEDURE (OUTPATIENT)
Age: 85
End: 2025-02-20

## 2025-02-20 ENCOUNTER — TELEPHONE (OUTPATIENT)
Age: 85
End: 2025-02-20

## 2025-02-20 PROBLEM — I25.10 CORONARY ARTERY DISEASE: Status: ACTIVE | Noted: 2025-02-20

## 2025-02-20 LAB
ABO + RH BLD: NORMAL
ANION GAP SERPL CALC-SCNC: 3 MMOL/L (ref 2–12)
BLOOD GROUP ANTIBODIES SERPL: NORMAL
BUN SERPL-MCNC: 29 MG/DL (ref 6–20)
BUN/CREAT SERPL: 17 (ref 12–20)
CALCIUM SERPL-MCNC: 8.8 MG/DL (ref 8.5–10.1)
CHLORIDE SERPL-SCNC: 112 MMOL/L (ref 97–108)
CO2 SERPL-SCNC: 26 MMOL/L (ref 21–32)
CREAT SERPL-MCNC: 1.75 MG/DL (ref 0.7–1.3)
ERYTHROCYTE [DISTWIDTH] IN BLOOD BY AUTOMATED COUNT: 15 % (ref 11.5–14.5)
GLUCOSE BLD STRIP.AUTO-MCNC: 84 MG/DL (ref 65–117)
GLUCOSE SERPL-MCNC: 90 MG/DL (ref 65–100)
HCT VFR BLD AUTO: 29 % (ref 36.6–50.3)
HGB BLD-MCNC: 9.4 G/DL (ref 12.1–17)
HISTORY CHECK: NORMAL
MCH RBC QN AUTO: 26.6 PG (ref 26–34)
MCHC RBC AUTO-ENTMCNC: 32.4 G/DL (ref 30–36.5)
MCV RBC AUTO: 82.2 FL (ref 80–99)
NRBC # BLD: 0 K/UL (ref 0–0.01)
NRBC BLD-RTO: 0 PER 100 WBC
PLATELET # BLD AUTO: 156 K/UL (ref 150–400)
PMV BLD AUTO: 10.9 FL (ref 8.9–12.9)
POTASSIUM SERPL-SCNC: 4.6 MMOL/L (ref 3.5–5.1)
RBC # BLD AUTO: 3.53 M/UL (ref 4.1–5.7)
SERVICE CMNT-IMP: NORMAL
SODIUM SERPL-SCNC: 141 MMOL/L (ref 136–145)
SPECIMEN EXP DATE BLD: NORMAL
UFH PPP CHRO-ACNC: 0.39 IU/ML
WBC # BLD AUTO: 6.3 K/UL (ref 4.1–11.1)

## 2025-02-20 PROCEDURE — 86850 RBC ANTIBODY SCREEN: CPT

## 2025-02-20 PROCEDURE — 6370000000 HC RX 637 (ALT 250 FOR IP): Performed by: INTERNAL MEDICINE

## 2025-02-20 PROCEDURE — 85520 HEPARIN ASSAY: CPT

## 2025-02-20 PROCEDURE — 36415 COLL VENOUS BLD VENIPUNCTURE: CPT

## 2025-02-20 PROCEDURE — 2100000001 HC CVICU R&B

## 2025-02-20 PROCEDURE — 6360000002 HC RX W HCPCS: Performed by: NURSE PRACTITIONER

## 2025-02-20 PROCEDURE — 86923 COMPATIBILITY TEST ELECTRIC: CPT

## 2025-02-20 PROCEDURE — 86901 BLOOD TYPING SEROLOGIC RH(D): CPT

## 2025-02-20 PROCEDURE — 6370000000 HC RX 637 (ALT 250 FOR IP): Performed by: STUDENT IN AN ORGANIZED HEALTH CARE EDUCATION/TRAINING PROGRAM

## 2025-02-20 PROCEDURE — 85027 COMPLETE CBC AUTOMATED: CPT

## 2025-02-20 PROCEDURE — 80048 BASIC METABOLIC PNL TOTAL CA: CPT

## 2025-02-20 PROCEDURE — 6370000000 HC RX 637 (ALT 250 FOR IP): Performed by: NURSE PRACTITIONER

## 2025-02-20 PROCEDURE — 86900 BLOOD TYPING SEROLOGIC ABO: CPT

## 2025-02-20 PROCEDURE — 2500000003 HC RX 250 WO HCPCS: Performed by: STUDENT IN AN ORGANIZED HEALTH CARE EDUCATION/TRAINING PROGRAM

## 2025-02-20 PROCEDURE — 82962 GLUCOSE BLOOD TEST: CPT

## 2025-02-20 RX ADMIN — METOPROLOL SUCCINATE 12.5 MG: 25 TABLET, EXTENDED RELEASE ORAL at 09:49

## 2025-02-20 RX ADMIN — ATORVASTATIN CALCIUM 40 MG: 40 TABLET, FILM COATED ORAL at 19:51

## 2025-02-20 RX ADMIN — ASPIRIN 81 MG: 81 TABLET, CHEWABLE ORAL at 09:49

## 2025-02-20 RX ADMIN — HEPARIN SODIUM 11 UNITS/KG/HR: 10000 INJECTION, SOLUTION INTRAVENOUS at 17:34

## 2025-02-20 RX ADMIN — SODIUM CHLORIDE, PRESERVATIVE FREE 10 ML: 5 INJECTION INTRAVENOUS at 19:52

## 2025-02-20 RX ADMIN — NITROGLYCERIN 1 INCH: 20 OINTMENT TOPICAL at 14:56

## 2025-02-20 RX ADMIN — MUPIROCIN: 20 OINTMENT TOPICAL at 19:52

## 2025-02-20 RX ADMIN — FERROUS SULFATE TAB 325 MG (65 MG ELEMENTAL FE) 325 MG: 325 (65 FE) TAB at 09:49

## 2025-02-20 RX ADMIN — NITROGLYCERIN 1 INCH: 20 OINTMENT TOPICAL at 01:02

## 2025-02-20 RX ADMIN — NITROGLYCERIN 1 INCH: 20 OINTMENT TOPICAL at 19:53

## 2025-02-20 RX ADMIN — MUPIROCIN: 20 OINTMENT TOPICAL at 11:24

## 2025-02-20 RX ADMIN — 0.12% CHLORHEXIDINE GLUCONATE 15 ML: 1.2 RINSE ORAL at 09:48

## 2025-02-20 RX ADMIN — NITROGLYCERIN 1 INCH: 20 OINTMENT TOPICAL at 09:49

## 2025-02-20 RX ADMIN — 0.12% CHLORHEXIDINE GLUCONATE 15 ML: 1.2 RINSE ORAL at 19:51

## 2025-02-20 RX ADMIN — POLYETHYLENE GLYCOL 3350 17 G: 17 POWDER, FOR SOLUTION ORAL at 09:49

## 2025-02-20 RX ADMIN — SENNOSIDES AND DOCUSATE SODIUM 2 TABLET: 50; 8.6 TABLET ORAL at 09:48

## 2025-02-20 ASSESSMENT — PAIN SCALES - GENERAL
PAINLEVEL_OUTOF10: 0

## 2025-02-20 NOTE — TELEPHONE ENCOUNTER
Patient Granddaughter called [phone] requested information regarding upcoming surgery.     Please assist with this matter.     EC-PSR- Float Pool

## 2025-02-20 NOTE — CONSENT
Informed Consent for Blood Component Transfusion Note    I have discussed with the patient the rationale for blood component transfusion; its benefits in treating or preventing fatigue, organ damage, or death; and its risk which includes mild transfusion reactions, rare risk of blood borne infection, or more serious but rare reactions. I have discussed the alternatives to transfusion, including the risk and consequences of not receiving transfusion. The patient had an opportunity to ask questions and had agreed to proceed with transfusion of blood components.    Electronically signed by LEVI Murillo NP on 2/20/25 at 12:03 PM EST

## 2025-02-21 ENCOUNTER — ANESTHESIA (OUTPATIENT)
Facility: HOSPITAL | Age: 85
End: 2025-02-21
Payer: MEDICARE

## 2025-02-21 ENCOUNTER — APPOINTMENT (OUTPATIENT)
Facility: HOSPITAL | Age: 85
DRG: 234 | End: 2025-02-21
Payer: MEDICARE

## 2025-02-21 ENCOUNTER — ANESTHESIA EVENT (OUTPATIENT)
Facility: HOSPITAL | Age: 85
End: 2025-02-21
Payer: MEDICARE

## 2025-02-21 ENCOUNTER — HOSPITAL ENCOUNTER (OUTPATIENT)
Facility: HOSPITAL | Age: 85
Discharge: HOME | End: 2025-02-23
Attending: THORACIC SURGERY (CARDIOTHORACIC VASCULAR SURGERY)

## 2025-02-21 PROBLEM — Z98.890 S/P LEFT ATRIAL APPENDAGE LIGATION: Status: ACTIVE | Noted: 2025-02-21

## 2025-02-21 PROBLEM — Z95.1 S/P CABG X 3: Status: ACTIVE | Noted: 2025-02-21

## 2025-02-21 LAB
ABO + RH BLD: NORMAL
ACUTE KIDNEY INJURY RISK NEPHROCHECK: 0.2 (ref 0–0.3)
ALBUMIN SERPL-MCNC: 2.4 G/DL (ref 3.5–5)
ALBUMIN SERPL-MCNC: 3.2 G/DL (ref 3.5–5)
ALBUMIN/GLOB SERPL: 0.8 (ref 1.1–2.2)
ALBUMIN/GLOB SERPL: 1.3 (ref 1.1–2.2)
ALP SERPL-CCNC: 58 U/L (ref 45–117)
ALP SERPL-CCNC: 70 U/L (ref 45–117)
ALT SERPL-CCNC: 24 U/L (ref 12–78)
ALT SERPL-CCNC: 31 U/L (ref 12–78)
ANION GAP BLD CALC-SCNC: 11 (ref 10–20)
ANION GAP BLD CALC-SCNC: 12 (ref 10–20)
ANION GAP BLD CALC-SCNC: 7 (ref 10–20)
ANION GAP BLD CALC-SCNC: 8 (ref 10–20)
ANION GAP BLD CALC-SCNC: 9 (ref 10–20)
ANION GAP SERPL CALC-SCNC: 3 MMOL/L (ref 2–12)
ANION GAP SERPL CALC-SCNC: 4 MMOL/L (ref 2–12)
ANION GAP SERPL CALC-SCNC: 6 MMOL/L (ref 2–12)
APTT PPP: 34.2 SEC (ref 22.1–31)
ARTERIAL PATENCY WRIST A: ABNORMAL
ARTERIAL PATENCY WRIST A: ABNORMAL
AST SERPL-CCNC: 26 U/L (ref 15–37)
AST SERPL-CCNC: 27 U/L (ref 15–37)
BASE DEFICIT BLD-SCNC: 0.9 MMOL/L
BASE DEFICIT BLD-SCNC: 2.7 MMOL/L
BASE DEFICIT BLD-SCNC: 3.4 MMOL/L
BASE DEFICIT BLD-SCNC: 4.7 MMOL/L
BASE DEFICIT BLD-SCNC: 5.1 MMOL/L
BASE DEFICIT BLD-SCNC: 5.4 MMOL/L
BASE DEFICIT BLD-SCNC: 6 MMOL/L
BASE DEFICIT BLDA-SCNC: 5.5 MMOL/L
BASE DEFICIT BLDA-SCNC: 5.7 MMOL/L
BDY SITE: ABNORMAL
BDY SITE: ABNORMAL
BILIRUB SERPL-MCNC: 0.6 MG/DL (ref 0.2–1)
BILIRUB SERPL-MCNC: 0.8 MG/DL (ref 0.2–1)
BLD PROD TYP BPU: NORMAL
BLD PROD TYP BPU: NORMAL
BLOOD BANK DISPENSE STATUS: NORMAL
BLOOD BANK DISPENSE STATUS: NORMAL
BLOOD GROUP ANTIBODIES SERPL: NORMAL
BPU ID: NORMAL
BPU ID: NORMAL
BUN SERPL-MCNC: 29 MG/DL (ref 6–20)
BUN SERPL-MCNC: 30 MG/DL (ref 6–20)
BUN SERPL-MCNC: 31 MG/DL (ref 6–20)
BUN/CREAT SERPL: 17 (ref 12–20)
BUN/CREAT SERPL: 17 (ref 12–20)
BUN/CREAT SERPL: 19 (ref 12–20)
CA-I BLD-MCNC: 1.12 MMOL/L (ref 1.15–1.33)
CA-I BLD-MCNC: 1.19 MMOL/L (ref 1.15–1.33)
CA-I BLD-MCNC: 1.2 MMOL/L (ref 1.15–1.33)
CA-I BLD-MCNC: 1.2 MMOL/L (ref 1.15–1.33)
CA-I BLD-MCNC: 1.23 MMOL/L (ref 1.15–1.33)
CA-I BLD-MCNC: 1.24 MMOL/L (ref 1.15–1.33)
CA-I BLD-MCNC: 1.3 MMOL/L (ref 1.15–1.33)
CA-I BLD-SCNC: 1.13 MMOL/L (ref 1.13–1.32)
CALCIUM SERPL-MCNC: 7.6 MG/DL (ref 8.5–10.1)
CALCIUM SERPL-MCNC: 8.1 MG/DL (ref 8.5–10.1)
CALCIUM SERPL-MCNC: 8.3 MG/DL (ref 8.5–10.1)
CHLORIDE BLD-SCNC: 106 MMOL/L (ref 100–111)
CHLORIDE BLD-SCNC: 107 MMOL/L (ref 100–111)
CHLORIDE BLD-SCNC: 108 MMOL/L (ref 100–111)
CHLORIDE BLD-SCNC: 109 MMOL/L (ref 100–111)
CHLORIDE BLD-SCNC: 111 MMOL/L (ref 100–111)
CHLORIDE BLD-SCNC: 112 MMOL/L (ref 100–111)
CHLORIDE BLD-SCNC: 113 MMOL/L (ref 100–111)
CHLORIDE SERPL-SCNC: 111 MMOL/L (ref 97–108)
CHLORIDE SERPL-SCNC: 113 MMOL/L (ref 97–108)
CHLORIDE SERPL-SCNC: 115 MMOL/L (ref 97–108)
CO2 BLD-SCNC: 20 MMOL/L (ref 22–29)
CO2 BLD-SCNC: 21 MMOL/L (ref 22–29)
CO2 BLD-SCNC: 21 MMOL/L (ref 22–29)
CO2 BLD-SCNC: 23 MMOL/L (ref 22–29)
CO2 BLD-SCNC: 23 MMOL/L (ref 22–29)
CO2 BLD-SCNC: 24 MMOL/L (ref 22–29)
CO2 BLD-SCNC: 26 MMOL/L (ref 22–29)
CO2 SERPL-SCNC: 22 MMOL/L (ref 21–32)
CO2 SERPL-SCNC: 23 MMOL/L (ref 21–32)
CO2 SERPL-SCNC: 25 MMOL/L (ref 21–32)
CREAT SERPL-MCNC: 1.67 MG/DL (ref 0.7–1.3)
CREAT SERPL-MCNC: 1.74 MG/DL (ref 0.7–1.3)
CREAT SERPL-MCNC: 1.74 MG/DL (ref 0.7–1.3)
CREAT UR-MCNC: 1.4 MG/DL (ref 0.6–1.3)
CREAT UR-MCNC: 1.6 MG/DL (ref 0.6–1.3)
CREAT UR-MCNC: 1.7 MG/DL (ref 0.6–1.3)
CREAT UR-MCNC: 1.9 MG/DL (ref 0.6–1.3)
CREAT UR-MCNC: 1.9 MG/DL (ref 0.6–1.3)
CROSSMATCH RESULT: NORMAL
CROSSMATCH RESULT: NORMAL
ECHO BSA: 1.83 M2
ERYTHROCYTE [DISTWIDTH] IN BLOOD BY AUTOMATED COUNT: 14.9 % (ref 11.5–14.5)
ERYTHROCYTE [DISTWIDTH] IN BLOOD BY AUTOMATED COUNT: 14.9 % (ref 11.5–14.5)
ERYTHROCYTE [DISTWIDTH] IN BLOOD BY AUTOMATED COUNT: 15 % (ref 11.5–14.5)
FIO2 ON VENT: 40 %
GLOBULIN SER CALC-MCNC: 2.4 G/DL (ref 2–4)
GLOBULIN SER CALC-MCNC: 2.9 G/DL (ref 2–4)
GLUCOSE BLD STRIP.AUTO-MCNC: 101 MG/DL (ref 65–117)
GLUCOSE BLD STRIP.AUTO-MCNC: 107 MG/DL (ref 65–117)
GLUCOSE BLD STRIP.AUTO-MCNC: 109 MG/DL (ref 65–117)
GLUCOSE BLD STRIP.AUTO-MCNC: 110 MG/DL (ref 65–117)
GLUCOSE BLD STRIP.AUTO-MCNC: 112 MG/DL (ref 65–117)
GLUCOSE BLD STRIP.AUTO-MCNC: 114 MG/DL (ref 65–117)
GLUCOSE BLD STRIP.AUTO-MCNC: 114 MG/DL (ref 74–99)
GLUCOSE BLD STRIP.AUTO-MCNC: 115 MG/DL (ref 65–117)
GLUCOSE BLD STRIP.AUTO-MCNC: 116 MG/DL (ref 65–117)
GLUCOSE BLD STRIP.AUTO-MCNC: 128 MG/DL (ref 65–117)
GLUCOSE BLD STRIP.AUTO-MCNC: 135 MG/DL (ref 74–99)
GLUCOSE BLD STRIP.AUTO-MCNC: 147 MG/DL (ref 74–99)
GLUCOSE BLD STRIP.AUTO-MCNC: 170 MG/DL (ref 74–99)
GLUCOSE BLD STRIP.AUTO-MCNC: 176 MG/DL (ref 74–99)
GLUCOSE BLD STRIP.AUTO-MCNC: 198 MG/DL (ref 74–99)
GLUCOSE BLD STRIP.AUTO-MCNC: 78 MG/DL (ref 74–99)
GLUCOSE BLD STRIP.AUTO-MCNC: 90 MG/DL (ref 65–117)
GLUCOSE SERPL-MCNC: 112 MG/DL (ref 65–100)
GLUCOSE SERPL-MCNC: 126 MG/DL (ref 65–100)
GLUCOSE SERPL-MCNC: 78 MG/DL (ref 65–100)
HCO3 BLDA-SCNC: 20 MMOL/L (ref 22–26)
HCO3 BLDA-SCNC: 20 MMOL/L (ref 22–26)
HCO3 BLDA-SCNC: 21 MMOL/L
HCO3 BLDA-SCNC: 21 MMOL/L
HCO3 BLDA-SCNC: 22 MMOL/L
HCO3 BLDA-SCNC: 23 MMOL/L
HCO3 BLDA-SCNC: 24 MMOL/L
HCO3 BLDA-SCNC: 25 MMOL/L
HCO3 BLDA-SCNC: 26 MMOL/L
HCT VFR BLD AUTO: 26.8 % (ref 36.6–50.3)
HCT VFR BLD AUTO: 27.8 % (ref 36.6–50.3)
HCT VFR BLD AUTO: 31.9 % (ref 36.6–50.3)
HGB BLD-MCNC: 10.2 G/DL (ref 12.1–17)
HGB BLD-MCNC: 8.7 G/DL (ref 12.1–17)
HGB BLD-MCNC: 9.1 G/DL (ref 12.1–17)
INR PPP: 1.2 (ref 0.9–1.1)
LACTATE BLD-SCNC: 0.74 MMOL/L (ref 0.4–2)
LACTATE BLD-SCNC: 1.55 MMOL/L (ref 0.4–2)
LACTATE BLD-SCNC: 2.39 MMOL/L (ref 0.4–2)
LACTATE BLD-SCNC: 2.65 MMOL/L (ref 0.4–2)
LACTATE BLD-SCNC: 3.65 MMOL/L (ref 0.4–2)
LACTATE BLD-SCNC: <0.3 MMOL/L (ref 0.4–2)
LACTATE BLD-SCNC: <0.3 MMOL/L (ref 0.4–2)
MAGNESIUM SERPL-MCNC: 1.7 MG/DL (ref 1.6–2.4)
MAGNESIUM SERPL-MCNC: 2.3 MG/DL (ref 1.6–2.4)
MCH RBC QN AUTO: 26.5 PG (ref 26–34)
MCH RBC QN AUTO: 26.6 PG (ref 26–34)
MCH RBC QN AUTO: 27.1 PG (ref 26–34)
MCHC RBC AUTO-ENTMCNC: 32 G/DL (ref 30–36.5)
MCHC RBC AUTO-ENTMCNC: 32.5 G/DL (ref 30–36.5)
MCHC RBC AUTO-ENTMCNC: 32.7 G/DL (ref 30–36.5)
MCV RBC AUTO: 81.7 FL (ref 80–99)
MCV RBC AUTO: 82.7 FL (ref 80–99)
MCV RBC AUTO: 83.3 FL (ref 80–99)
NRBC # BLD: 0 K/UL (ref 0–0.01)
NRBC BLD-RTO: 0 PER 100 WBC
PCO2 BLD: 42.7 MMHG (ref 35–48)
PCO2 BLD: 42.9 MMHG (ref 35–48)
PCO2 BLD: 45.1 MMHG (ref 35–48)
PCO2 BLD: 45.6 MMHG (ref 35–48)
PCO2 BLD: 47.6 MMHG (ref 35–48)
PCO2 BLD: 61.1 MMHG (ref 35–48)
PCO2 BLD: 75.1 MMHG (ref 35–48)
PCO2 BLDA: 39 MMHG (ref 35–45)
PCO2 BLDA: 41 MMHG (ref 35–45)
PH BLD: 7.15 (ref 7.35–7.45)
PH BLD: 7.18 (ref 7.35–7.45)
PH BLD: 7.27 (ref 7.35–7.45)
PH BLD: 7.28 (ref 7.35–7.45)
PH BLD: 7.31 (ref 7.35–7.45)
PH BLD: 7.31 (ref 7.35–7.45)
PH BLD: 7.36 (ref 7.35–7.45)
PH BLDA: 7.31 (ref 7.35–7.45)
PH BLDA: 7.33 (ref 7.35–7.45)
PLATELET # BLD AUTO: 134 K/UL (ref 150–400)
PLATELET # BLD AUTO: 150 K/UL (ref 150–400)
PLATELET # BLD AUTO: 156 K/UL (ref 150–400)
PMV BLD AUTO: 10.7 FL (ref 8.9–12.9)
PMV BLD AUTO: 10.7 FL (ref 8.9–12.9)
PMV BLD AUTO: 11.5 FL (ref 8.9–12.9)
PO2 BLD: 421 MMHG (ref 83–108)
PO2 BLD: 486 MMHG (ref 83–108)
PO2 BLD: 514 MMHG (ref 83–108)
PO2 BLD: >515 MMHG (ref 83–108)
PO2 BLDA: 126 MMHG (ref 80–100)
PO2 BLDA: 131 MMHG (ref 80–100)
POTASSIUM BLD-SCNC: 3.4 MMOL/L (ref 3.5–5.5)
POTASSIUM BLD-SCNC: 3.5 MMOL/L (ref 3.5–5.5)
POTASSIUM BLD-SCNC: 3.7 MMOL/L (ref 3.5–5.5)
POTASSIUM BLD-SCNC: 4 MMOL/L (ref 3.5–5.5)
POTASSIUM BLD-SCNC: 4.5 MMOL/L (ref 3.5–5.5)
POTASSIUM BLD-SCNC: 4.5 MMOL/L (ref 3.5–5.5)
POTASSIUM BLD-SCNC: 4.6 MMOL/L (ref 3.5–5.5)
POTASSIUM SERPL-SCNC: 3.5 MMOL/L (ref 3.5–5.1)
POTASSIUM SERPL-SCNC: 4.6 MMOL/L (ref 3.5–5.1)
POTASSIUM SERPL-SCNC: 5.4 MMOL/L (ref 3.5–5.1)
PROT SERPL-MCNC: 5.3 G/DL (ref 6.4–8.2)
PROT SERPL-MCNC: 5.6 G/DL (ref 6.4–8.2)
PROTHROMBIN TIME: 12.9 SEC (ref 9.2–11.2)
RBC # BLD AUTO: 3.28 M/UL (ref 4.1–5.7)
RBC # BLD AUTO: 3.36 M/UL (ref 4.1–5.7)
RBC # BLD AUTO: 3.83 M/UL (ref 4.1–5.7)
SAO2 % BLD: 100 % (ref 94–98)
SAO2 % BLD: 98 % (ref 92–97)
SAO2 % BLD: 98 % (ref 92–97)
SAO2% DEVICE SAO2% SENSOR NAME: ABNORMAL
SAO2% DEVICE SAO2% SENSOR NAME: ABNORMAL
SERVICE CMNT-IMP: ABNORMAL
SERVICE CMNT-IMP: NORMAL
SODIUM BLD-SCNC: 140 MMOL/L (ref 136–145)
SODIUM BLD-SCNC: 140 MMOL/L (ref 136–145)
SODIUM BLD-SCNC: 141 MMOL/L (ref 136–145)
SODIUM BLD-SCNC: 141 MMOL/L (ref 136–145)
SODIUM BLD-SCNC: 142 MMOL/L (ref 136–145)
SODIUM BLD-SCNC: 144 MMOL/L (ref 136–145)
SODIUM BLD-SCNC: 145 MMOL/L (ref 136–145)
SODIUM SERPL-SCNC: 139 MMOL/L (ref 136–145)
SODIUM SERPL-SCNC: 141 MMOL/L (ref 136–145)
SODIUM SERPL-SCNC: 142 MMOL/L (ref 136–145)
SPECIMEN EXP DATE BLD: NORMAL
SPECIMEN SITE: ABNORMAL
THERAPEUTIC RANGE: ABNORMAL SECS (ref 58–77)
UNIT DIVISION: 0
UNIT DIVISION: 0
VENTILATION MODE VENT: ABNORMAL
WBC # BLD AUTO: 14.6 K/UL (ref 4.1–11.1)
WBC # BLD AUTO: 17.8 K/UL (ref 4.1–11.1)
WBC # BLD AUTO: 5.4 K/UL (ref 4.1–11.1)

## 2025-02-21 PROCEDURE — 33268 EXCL LAA OPN OTH PX ANY METH: CPT | Performed by: PHYSICIAN ASSISTANT

## 2025-02-21 PROCEDURE — 0D9670Z DRAINAGE OF STOMACH WITH DRAINAGE DEVICE, VIA NATURAL OR ARTIFICIAL OPENING: ICD-10-PCS | Performed by: PHYSICIAN ASSISTANT

## 2025-02-21 PROCEDURE — 2720000010 HC SURG SUPPLY STERILE: Performed by: THORACIC SURGERY (CARDIOTHORACIC VASCULAR SURGERY)

## 2025-02-21 PROCEDURE — 3700000000 HC ANESTHESIA ATTENDED CARE: Performed by: THORACIC SURGERY (CARDIOTHORACIC VASCULAR SURGERY)

## 2025-02-21 PROCEDURE — 6360000002 HC RX W HCPCS: Performed by: ANESTHESIOLOGY

## 2025-02-21 PROCEDURE — C1781 MESH (IMPLANTABLE): HCPCS | Performed by: THORACIC SURGERY (CARDIOTHORACIC VASCULAR SURGERY)

## 2025-02-21 PROCEDURE — 84100 ASSAY OF PHOSPHORUS: CPT

## 2025-02-21 PROCEDURE — 36415 COLL VENOUS BLD VENIPUNCTURE: CPT

## 2025-02-21 PROCEDURE — 33508 ENDOSCOPIC VEIN HARVEST: CPT | Performed by: THORACIC SURGERY (CARDIOTHORACIC VASCULAR SURGERY)

## 2025-02-21 PROCEDURE — 85610 PROTHROMBIN TIME: CPT

## 2025-02-21 PROCEDURE — 84295 ASSAY OF SERUM SODIUM: CPT

## 2025-02-21 PROCEDURE — 33533 CABG ARTERIAL SINGLE: CPT | Performed by: THORACIC SURGERY (CARDIOTHORACIC VASCULAR SURGERY)

## 2025-02-21 PROCEDURE — 6360000002 HC RX W HCPCS: Performed by: THORACIC SURGERY (CARDIOTHORACIC VASCULAR SURGERY)

## 2025-02-21 PROCEDURE — 2580000003 HC RX 258: Performed by: ANESTHESIOLOGY

## 2025-02-21 PROCEDURE — C1889 IMPLANT/INSERT DEVICE, NOC: HCPCS | Performed by: THORACIC SURGERY (CARDIOTHORACIC VASCULAR SURGERY)

## 2025-02-21 PROCEDURE — 6360000002 HC RX W HCPCS: Performed by: NURSE PRACTITIONER

## 2025-02-21 PROCEDURE — 02100Z9 BYPASS CORONARY ARTERY, ONE ARTERY FROM LEFT INTERNAL MAMMARY, OPEN APPROACH: ICD-10-PCS | Performed by: THORACIC SURGERY (CARDIOTHORACIC VASCULAR SURGERY)

## 2025-02-21 PROCEDURE — 82947 ASSAY GLUCOSE BLOOD QUANT: CPT

## 2025-02-21 PROCEDURE — 2580000003 HC RX 258: Performed by: NURSE PRACTITIONER

## 2025-02-21 PROCEDURE — C1751 CATH, INF, PER/CENT/MIDLINE: HCPCS | Performed by: THORACIC SURGERY (CARDIOTHORACIC VASCULAR SURGERY)

## 2025-02-21 PROCEDURE — 02HV33Z INSERTION OF INFUSION DEVICE INTO SUPERIOR VENA CAVA, PERCUTANEOUS APPROACH: ICD-10-PCS | Performed by: PHYSICIAN ASSISTANT

## 2025-02-21 PROCEDURE — 6370000000 HC RX 637 (ALT 250 FOR IP): Performed by: PHYSICIAN ASSISTANT

## 2025-02-21 PROCEDURE — 94002 VENT MGMT INPAT INIT DAY: CPT

## 2025-02-21 PROCEDURE — 33518 CABG ARTERY-VEIN TWO: CPT | Performed by: PHYSICIAN ASSISTANT

## 2025-02-21 PROCEDURE — 85018 HEMOGLOBIN: CPT

## 2025-02-21 PROCEDURE — 83735 ASSAY OF MAGNESIUM: CPT

## 2025-02-21 PROCEDURE — 84132 ASSAY OF SERUM POTASSIUM: CPT

## 2025-02-21 PROCEDURE — 6360000002 HC RX W HCPCS: Performed by: PHYSICIAN ASSISTANT

## 2025-02-21 PROCEDURE — B24BZZ4 ULTRASONOGRAPHY OF HEART WITH AORTA, TRANSESOPHAGEAL: ICD-10-PCS | Performed by: ANESTHESIOLOGY

## 2025-02-21 PROCEDURE — 6370000000 HC RX 637 (ALT 250 FOR IP): Performed by: STUDENT IN AN ORGANIZED HEALTH CARE EDUCATION/TRAINING PROGRAM

## 2025-02-21 PROCEDURE — 6370000000 HC RX 637 (ALT 250 FOR IP): Performed by: ANESTHESIOLOGY

## 2025-02-21 PROCEDURE — 85027 COMPLETE CBC AUTOMATED: CPT

## 2025-02-21 PROCEDURE — P9045 ALBUMIN (HUMAN), 5%, 250 ML: HCPCS | Performed by: PHYSICIAN ASSISTANT

## 2025-02-21 PROCEDURE — 82962 GLUCOSE BLOOD TEST: CPT

## 2025-02-21 PROCEDURE — 33508 ENDOSCOPIC VEIN HARVEST: CPT | Performed by: PHYSICIAN ASSISTANT

## 2025-02-21 PROCEDURE — 03HY32Z INSERTION OF MONITORING DEVICE INTO UPPER ARTERY, PERCUTANEOUS APPROACH: ICD-10-PCS | Performed by: ANESTHESIOLOGY

## 2025-02-21 PROCEDURE — P9045 ALBUMIN (HUMAN), 5%, 250 ML: HCPCS | Performed by: NURSE PRACTITIONER

## 2025-02-21 PROCEDURE — 3600000018 HC SURGERY OHS ADDTL 15MIN: Performed by: THORACIC SURGERY (CARDIOTHORACIC VASCULAR SURGERY)

## 2025-02-21 PROCEDURE — 71045 X-RAY EXAM CHEST 1 VIEW: CPT

## 2025-02-21 PROCEDURE — 2100000001 HC CVICU R&B

## 2025-02-21 PROCEDURE — C1729 CATH, DRAINAGE: HCPCS | Performed by: THORACIC SURGERY (CARDIOTHORACIC VASCULAR SURGERY)

## 2025-02-21 PROCEDURE — 85730 THROMBOPLASTIN TIME PARTIAL: CPT

## 2025-02-21 PROCEDURE — 2709999900 HC NON-CHARGEABLE SUPPLY: Performed by: THORACIC SURGERY (CARDIOTHORACIC VASCULAR SURGERY)

## 2025-02-21 PROCEDURE — 2500000003 HC RX 250 WO HCPCS: Performed by: THORACIC SURGERY (CARDIOTHORACIC VASCULAR SURGERY)

## 2025-02-21 PROCEDURE — 021109W BYPASS CORONARY ARTERY, TWO ARTERIES FROM AORTA WITH AUTOLOGOUS VENOUS TISSUE, OPEN APPROACH: ICD-10-PCS | Performed by: THORACIC SURGERY (CARDIOTHORACIC VASCULAR SURGERY)

## 2025-02-21 PROCEDURE — 80053 COMPREHEN METABOLIC PANEL: CPT

## 2025-02-21 PROCEDURE — 2500000003 HC RX 250 WO HCPCS: Performed by: PHYSICIAN ASSISTANT

## 2025-02-21 PROCEDURE — 2580000003 HC RX 258: Performed by: PHYSICIAN ASSISTANT

## 2025-02-21 PROCEDURE — 3700000001 HC ADD 15 MINUTES (ANESTHESIA): Performed by: THORACIC SURGERY (CARDIOTHORACIC VASCULAR SURGERY)

## 2025-02-21 PROCEDURE — 6370000000 HC RX 637 (ALT 250 FOR IP): Performed by: NURSE PRACTITIONER

## 2025-02-21 PROCEDURE — B245ZZ4 ULTRASONOGRAPHY OF LEFT HEART, TRANSESOPHAGEAL: ICD-10-PCS | Performed by: THORACIC SURGERY (CARDIOTHORACIC VASCULAR SURGERY)

## 2025-02-21 PROCEDURE — 2500000003 HC RX 250 WO HCPCS: Performed by: ANESTHESIOLOGY

## 2025-02-21 PROCEDURE — 82803 BLOOD GASES ANY COMBINATION: CPT

## 2025-02-21 PROCEDURE — A4648 IMPLANTABLE TISSUE MARKER: HCPCS | Performed by: THORACIC SURGERY (CARDIOTHORACIC VASCULAR SURGERY)

## 2025-02-21 PROCEDURE — 93005 ELECTROCARDIOGRAM TRACING: CPT | Performed by: PHYSICIAN ASSISTANT

## 2025-02-21 PROCEDURE — 82330 ASSAY OF CALCIUM: CPT

## 2025-02-21 PROCEDURE — 02L70CK OCCLUSION OF LEFT ATRIAL APPENDAGE WITH EXTRALUMINAL DEVICE, OPEN APPROACH: ICD-10-PCS | Performed by: THORACIC SURGERY (CARDIOTHORACIC VASCULAR SURGERY)

## 2025-02-21 PROCEDURE — 33518 CABG ARTERY-VEIN TWO: CPT | Performed by: THORACIC SURGERY (CARDIOTHORACIC VASCULAR SURGERY)

## 2025-02-21 PROCEDURE — 3600000008 HC SURGERY OHS BASE: Performed by: THORACIC SURGERY (CARDIOTHORACIC VASCULAR SURGERY)

## 2025-02-21 PROCEDURE — C1713 ANCHOR/SCREW BN/BN,TIS/BN: HCPCS | Performed by: THORACIC SURGERY (CARDIOTHORACIC VASCULAR SURGERY)

## 2025-02-21 PROCEDURE — 33533 CABG ARTERIAL SINGLE: CPT | Performed by: PHYSICIAN ASSISTANT

## 2025-02-21 PROCEDURE — 06BP4ZZ EXCISION OF RIGHT SAPHENOUS VEIN, PERCUTANEOUS ENDOSCOPIC APPROACH: ICD-10-PCS | Performed by: THORACIC SURGERY (CARDIOTHORACIC VASCULAR SURGERY)

## 2025-02-21 PROCEDURE — 33268 EXCL LAA OPN OTH PX ANY METH: CPT | Performed by: THORACIC SURGERY (CARDIOTHORACIC VASCULAR SURGERY)

## 2025-02-21 PROCEDURE — 37799 UNLISTED PX VASCULAR SURGERY: CPT

## 2025-02-21 DEVICE — CLIP MED SUTURE LESS 40 MM SYS 1 HND STRL ATRICLIP FLX V: Type: IMPLANTABLE DEVICE | Site: CHEST | Status: FUNCTIONAL

## 2025-02-21 RX ORDER — GLUCAGON 1 MG/ML
1 KIT INJECTION PRN
Status: DISCONTINUED | OUTPATIENT
Start: 2025-02-21 | End: 2025-02-27 | Stop reason: HOSPADM

## 2025-02-21 RX ORDER — ONDANSETRON 2 MG/ML
INJECTION INTRAMUSCULAR; INTRAVENOUS
Status: DISCONTINUED | OUTPATIENT
Start: 2025-02-21 | End: 2025-02-21 | Stop reason: SDUPTHER

## 2025-02-21 RX ORDER — INSULIN LISPRO 100 [IU]/ML
0-6 INJECTION, SOLUTION INTRAVENOUS; SUBCUTANEOUS NIGHTLY
Status: DISCONTINUED | OUTPATIENT
Start: 2025-02-23 | End: 2025-02-26

## 2025-02-21 RX ORDER — GABAPENTIN 300 MG/1
300 CAPSULE ORAL ONCE
Status: COMPLETED | OUTPATIENT
Start: 2025-02-21 | End: 2025-02-21

## 2025-02-21 RX ORDER — BUPIVACAINE HYDROCHLORIDE 2.5 MG/ML
INJECTION, SOLUTION EPIDURAL; INFILTRATION; INTRACAUDAL PRN
Status: DISCONTINUED | OUTPATIENT
Start: 2025-02-21 | End: 2025-02-21 | Stop reason: ALTCHOICE

## 2025-02-21 RX ORDER — PAPAVERINE HYDROCHLORIDE 30 MG/ML
INJECTION INTRAMUSCULAR; INTRAVENOUS PRN
Status: DISCONTINUED | OUTPATIENT
Start: 2025-02-21 | End: 2025-02-21 | Stop reason: ALTCHOICE

## 2025-02-21 RX ORDER — EPINEPHRINE 1 MG/ML(1)
AMPUL (ML) INJECTION
Status: DISCONTINUED | OUTPATIENT
Start: 2025-02-21 | End: 2025-02-21 | Stop reason: SDUPTHER

## 2025-02-21 RX ORDER — CHLORHEXIDINE GLUCONATE ORAL RINSE 1.2 MG/ML
15 SOLUTION DENTAL 2 TIMES DAILY
Status: DISCONTINUED | OUTPATIENT
Start: 2025-02-21 | End: 2025-02-27 | Stop reason: HOSPADM

## 2025-02-21 RX ORDER — LIDOCAINE 4 G/G
2 PATCH TOPICAL DAILY
Status: DISCONTINUED | OUTPATIENT
Start: 2025-02-21 | End: 2025-02-27 | Stop reason: HOSPADM

## 2025-02-21 RX ORDER — ROCURONIUM BROMIDE 10 MG/ML
INJECTION, SOLUTION INTRAVENOUS
Status: DISCONTINUED | OUTPATIENT
Start: 2025-02-21 | End: 2025-02-21 | Stop reason: SDUPTHER

## 2025-02-21 RX ORDER — MAGNESIUM SULFATE IN WATER 40 MG/ML
2000 INJECTION, SOLUTION INTRAVENOUS PRN
Status: DISCONTINUED | OUTPATIENT
Start: 2025-02-21 | End: 2025-02-27 | Stop reason: HOSPADM

## 2025-02-21 RX ORDER — ACETAMINOPHEN 500 MG
1000 TABLET ORAL EVERY 6 HOURS
Status: DISCONTINUED | OUTPATIENT
Start: 2025-02-21 | End: 2025-02-27 | Stop reason: HOSPADM

## 2025-02-21 RX ORDER — POTASSIUM CHLORIDE 29.8 MG/ML
20 INJECTION INTRAVENOUS PRN
Status: DISCONTINUED | OUTPATIENT
Start: 2025-02-21 | End: 2025-02-21

## 2025-02-21 RX ORDER — SODIUM CHLORIDE 0.9 % (FLUSH) 0.9 %
5-40 SYRINGE (ML) INJECTION PRN
Status: DISCONTINUED | OUTPATIENT
Start: 2025-02-21 | End: 2025-02-27 | Stop reason: HOSPADM

## 2025-02-21 RX ORDER — 0.9 % SODIUM CHLORIDE 0.9 %
INTRAVENOUS SOLUTION INTRAVENOUS
Status: DISCONTINUED | OUTPATIENT
Start: 2025-02-21 | End: 2025-02-21 | Stop reason: SDUPTHER

## 2025-02-21 RX ORDER — INSULIN LISPRO 100 [IU]/ML
1-20 INJECTION, SOLUTION INTRAVENOUS; SUBCUTANEOUS
Status: DISCONTINUED | OUTPATIENT
Start: 2025-02-21 | End: 2025-02-23

## 2025-02-21 RX ORDER — SENNA AND DOCUSATE SODIUM 50; 8.6 MG/1; MG/1
1 TABLET, FILM COATED ORAL 2 TIMES DAILY
Status: DISCONTINUED | OUTPATIENT
Start: 2025-02-21 | End: 2025-02-27 | Stop reason: HOSPADM

## 2025-02-21 RX ORDER — PHENYLEPHRINE HCL IN 0.9% NACL 0.4MG/10ML
SYRINGE (ML) INTRAVENOUS
Status: DISCONTINUED | OUTPATIENT
Start: 2025-02-21 | End: 2025-02-21 | Stop reason: SDUPTHER

## 2025-02-21 RX ORDER — MIDAZOLAM HYDROCHLORIDE 2 MG/2ML
1 INJECTION, SOLUTION INTRAMUSCULAR; INTRAVENOUS
Status: DISCONTINUED | OUTPATIENT
Start: 2025-02-21 | End: 2025-02-26

## 2025-02-21 RX ORDER — DIPHENHYDRAMINE HCL 25 MG
25 CAPSULE ORAL NIGHTLY PRN
Status: DISCONTINUED | OUTPATIENT
Start: 2025-02-22 | End: 2025-02-27 | Stop reason: HOSPADM

## 2025-02-21 RX ORDER — EPINEPHRINE 1 MG/ML(1)
AMPUL (ML) INJECTION
Status: DISCONTINUED | OUTPATIENT
Start: 2025-02-21 | End: 2025-02-21

## 2025-02-21 RX ORDER — HYDRALAZINE HYDROCHLORIDE 20 MG/ML
10 INJECTION INTRAMUSCULAR; INTRAVENOUS EVERY 6 HOURS PRN
Status: DISCONTINUED | OUTPATIENT
Start: 2025-02-21 | End: 2025-02-27 | Stop reason: HOSPADM

## 2025-02-21 RX ORDER — MIDAZOLAM HYDROCHLORIDE 1 MG/ML
INJECTION, SOLUTION INTRAMUSCULAR; INTRAVENOUS
Status: DISCONTINUED | OUTPATIENT
Start: 2025-02-21 | End: 2025-02-21 | Stop reason: SDUPTHER

## 2025-02-21 RX ORDER — INSULIN GLARGINE 100 [IU]/ML
1-50 INJECTION, SOLUTION SUBCUTANEOUS
Status: DISPENSED | OUTPATIENT
Start: 2025-02-23 | End: 2025-02-24

## 2025-02-21 RX ORDER — DEXAMETHASONE SODIUM PHOSPHATE 4 MG/ML
INJECTION, SOLUTION INTRA-ARTICULAR; INTRALESIONAL; INTRAMUSCULAR; INTRAVENOUS; SOFT TISSUE
Status: DISCONTINUED | OUTPATIENT
Start: 2025-02-21 | End: 2025-02-21 | Stop reason: SDUPTHER

## 2025-02-21 RX ORDER — SODIUM CHLORIDE, SODIUM LACTATE, POTASSIUM CHLORIDE, AND CALCIUM CHLORIDE .6; .31; .03; .02 G/100ML; G/100ML; G/100ML; G/100ML
1000 INJECTION, SOLUTION INTRAVENOUS ONCE
Status: COMPLETED | OUTPATIENT
Start: 2025-02-21 | End: 2025-02-22

## 2025-02-21 RX ORDER — ALBUMIN HUMAN 50 G/1000ML
12.5 SOLUTION INTRAVENOUS ONCE
Status: COMPLETED | OUTPATIENT
Start: 2025-02-21 | End: 2025-02-21

## 2025-02-21 RX ORDER — ATORVASTATIN CALCIUM 40 MG/1
40 TABLET, FILM COATED ORAL NIGHTLY
Status: DISCONTINUED | OUTPATIENT
Start: 2025-02-21 | End: 2025-02-21 | Stop reason: SDUPTHER

## 2025-02-21 RX ORDER — LANOLIN ALCOHOL/MO/W.PET/CERES
400 CREAM (GRAM) TOPICAL 2 TIMES DAILY
Status: DISCONTINUED | OUTPATIENT
Start: 2025-02-22 | End: 2025-02-27

## 2025-02-21 RX ORDER — ONDANSETRON 2 MG/ML
4 INJECTION INTRAMUSCULAR; INTRAVENOUS ONCE
Status: COMPLETED | OUTPATIENT
Start: 2025-02-21 | End: 2025-02-21

## 2025-02-21 RX ORDER — DEXMEDETOMIDINE HYDROCHLORIDE 4 UG/ML
.1-1.5 INJECTION, SOLUTION INTRAVENOUS CONTINUOUS
Status: DISCONTINUED | OUTPATIENT
Start: 2025-02-21 | End: 2025-02-23

## 2025-02-21 RX ORDER — CEFAZOLIN SODIUM 1 G/3ML
INJECTION, POWDER, FOR SOLUTION INTRAMUSCULAR; INTRAVENOUS PRN
Status: DISCONTINUED | OUTPATIENT
Start: 2025-02-21 | End: 2025-02-21 | Stop reason: ALTCHOICE

## 2025-02-21 RX ORDER — ALBUMIN HUMAN 50 G/1000ML
12.5 SOLUTION INTRAVENOUS PRN
Status: COMPLETED | OUTPATIENT
Start: 2025-02-21 | End: 2025-02-21

## 2025-02-21 RX ORDER — GABAPENTIN 100 MG/1
200 CAPSULE ORAL 3 TIMES DAILY
Status: DISCONTINUED | OUTPATIENT
Start: 2025-02-21 | End: 2025-02-24

## 2025-02-21 RX ORDER — SODIUM CHLORIDE, SODIUM LACTATE, POTASSIUM CHLORIDE, AND CALCIUM CHLORIDE .6; .31; .03; .02 G/100ML; G/100ML; G/100ML; G/100ML
1000 INJECTION, SOLUTION INTRAVENOUS ONCE
Status: COMPLETED | OUTPATIENT
Start: 2025-02-21 | End: 2025-02-21

## 2025-02-21 RX ORDER — OXYCODONE HYDROCHLORIDE 5 MG/1
5 TABLET ORAL EVERY 4 HOURS PRN
Status: DISCONTINUED | OUTPATIENT
Start: 2025-02-21 | End: 2025-02-27 | Stop reason: HOSPADM

## 2025-02-21 RX ORDER — FAMOTIDINE 20 MG/1
20 TABLET, FILM COATED ORAL DAILY
Status: DISCONTINUED | OUTPATIENT
Start: 2025-02-22 | End: 2025-02-27 | Stop reason: HOSPADM

## 2025-02-21 RX ORDER — FENTANYL CITRATE 50 UG/ML
INJECTION, SOLUTION INTRAMUSCULAR; INTRAVENOUS
Status: DISCONTINUED | OUTPATIENT
Start: 2025-02-21 | End: 2025-02-21 | Stop reason: SDUPTHER

## 2025-02-21 RX ORDER — SODIUM CHLORIDE, SODIUM LACTATE, POTASSIUM CHLORIDE, CALCIUM CHLORIDE 600; 310; 30; 20 MG/100ML; MG/100ML; MG/100ML; MG/100ML
INJECTION, SOLUTION INTRAVENOUS
Status: DISCONTINUED | OUTPATIENT
Start: 2025-02-21 | End: 2025-02-21 | Stop reason: SDUPTHER

## 2025-02-21 RX ORDER — MAGNESIUM SULFATE IN WATER 40 MG/ML
INJECTION, SOLUTION INTRAVENOUS
Status: DISCONTINUED | OUTPATIENT
Start: 2025-02-21 | End: 2025-02-21 | Stop reason: SDUPTHER

## 2025-02-21 RX ORDER — SODIUM CHLORIDE 450 MG/100ML
INJECTION, SOLUTION INTRAVENOUS CONTINUOUS
Status: DISCONTINUED | OUTPATIENT
Start: 2025-02-21 | End: 2025-02-24

## 2025-02-21 RX ORDER — SODIUM CHLORIDE 9 MG/ML
INJECTION, SOLUTION INTRAVENOUS CONTINUOUS
Status: DISCONTINUED | OUTPATIENT
Start: 2025-02-21 | End: 2025-02-27 | Stop reason: HOSPADM

## 2025-02-21 RX ORDER — EPHEDRINE SULFATE/0.9% NACL/PF 50 MG/5 ML
SYRINGE (ML) INTRAVENOUS
Status: DISCONTINUED | OUTPATIENT
Start: 2025-02-21 | End: 2025-02-21 | Stop reason: SDUPTHER

## 2025-02-21 RX ORDER — POLYETHYLENE GLYCOL 3350 17 G/17G
17 POWDER, FOR SOLUTION ORAL DAILY
Status: DISCONTINUED | OUTPATIENT
Start: 2025-02-21 | End: 2025-02-27 | Stop reason: HOSPADM

## 2025-02-21 RX ORDER — LIDOCAINE HYDROCHLORIDE 20 MG/ML
INJECTION, SOLUTION EPIDURAL; INFILTRATION; INTRACAUDAL; PERINEURAL
Status: DISCONTINUED | OUTPATIENT
Start: 2025-02-21 | End: 2025-02-21 | Stop reason: SDUPTHER

## 2025-02-21 RX ORDER — DEXTROSE MONOHYDRATE 100 MG/ML
INJECTION, SOLUTION INTRAVENOUS CONTINUOUS PRN
Status: DISCONTINUED | OUTPATIENT
Start: 2025-02-21 | End: 2025-02-27 | Stop reason: HOSPADM

## 2025-02-21 RX ORDER — OXYCODONE HYDROCHLORIDE 5 MG/1
10 TABLET ORAL EVERY 4 HOURS PRN
Status: DISCONTINUED | OUTPATIENT
Start: 2025-02-21 | End: 2025-02-27 | Stop reason: HOSPADM

## 2025-02-21 RX ORDER — MUPIROCIN 20 MG/G
OINTMENT TOPICAL 2 TIMES DAILY
Status: COMPLETED | OUTPATIENT
Start: 2025-02-21 | End: 2025-02-25

## 2025-02-21 RX ORDER — PROTAMINE SULFATE 10 MG/ML
INJECTION, SOLUTION INTRAVENOUS
Status: DISCONTINUED | OUTPATIENT
Start: 2025-02-21 | End: 2025-02-21 | Stop reason: SDUPTHER

## 2025-02-21 RX ORDER — FAMOTIDINE 20 MG/1
20 TABLET, FILM COATED ORAL 2 TIMES DAILY
Status: DISCONTINUED | OUTPATIENT
Start: 2025-02-21 | End: 2025-02-21

## 2025-02-21 RX ORDER — ACETAMINOPHEN 500 MG
1000 TABLET ORAL ONCE
Status: COMPLETED | OUTPATIENT
Start: 2025-02-21 | End: 2025-02-21

## 2025-02-21 RX ORDER — IPRATROPIUM BROMIDE AND ALBUTEROL SULFATE 2.5; .5 MG/3ML; MG/3ML
1 SOLUTION RESPIRATORY (INHALATION) EVERY 4 HOURS PRN
Status: DISCONTINUED | OUTPATIENT
Start: 2025-02-21 | End: 2025-02-27 | Stop reason: HOSPADM

## 2025-02-21 RX ORDER — CEFAZOLIN SODIUM 1 G/3ML
INJECTION, POWDER, FOR SOLUTION INTRAMUSCULAR; INTRAVENOUS
Status: DISCONTINUED | OUTPATIENT
Start: 2025-02-21 | End: 2025-02-21 | Stop reason: SDUPTHER

## 2025-02-21 RX ORDER — HEPARIN SODIUM 1000 [USP'U]/ML
INJECTION, SOLUTION INTRAVENOUS; SUBCUTANEOUS
Status: DISCONTINUED | OUTPATIENT
Start: 2025-02-21 | End: 2025-02-21 | Stop reason: SDUPTHER

## 2025-02-21 RX ORDER — SODIUM CHLORIDE 0.9 % (FLUSH) 0.9 %
5-40 SYRINGE (ML) INJECTION EVERY 12 HOURS SCHEDULED
Status: DISCONTINUED | OUTPATIENT
Start: 2025-02-21 | End: 2025-02-27 | Stop reason: HOSPADM

## 2025-02-21 RX ORDER — INSULIN LISPRO 100 [IU]/ML
0-12 INJECTION, SOLUTION INTRAVENOUS; SUBCUTANEOUS
Status: DISCONTINUED | OUTPATIENT
Start: 2025-02-23 | End: 2025-02-26

## 2025-02-21 RX ORDER — AMIODARONE HYDROCHLORIDE 200 MG/1
400 TABLET ORAL 2 TIMES DAILY
Status: DISCONTINUED | OUTPATIENT
Start: 2025-02-22 | End: 2025-02-26

## 2025-02-21 RX ORDER — ASPIRIN 81 MG/1
81 TABLET ORAL DAILY
Status: DISCONTINUED | OUTPATIENT
Start: 2025-02-22 | End: 2025-02-21 | Stop reason: SDUPTHER

## 2025-02-21 RX ORDER — ONDANSETRON 2 MG/ML
4 INJECTION INTRAMUSCULAR; INTRAVENOUS EVERY 4 HOURS PRN
Status: DISCONTINUED | OUTPATIENT
Start: 2025-02-21 | End: 2025-02-27 | Stop reason: HOSPADM

## 2025-02-21 RX ORDER — BISACODYL 10 MG
10 SUPPOSITORY, RECTAL RECTAL DAILY PRN
Status: DISCONTINUED | OUTPATIENT
Start: 2025-02-21 | End: 2025-02-27 | Stop reason: HOSPADM

## 2025-02-21 RX ADMIN — EPINEPHRINE 8 MCG: 1 INJECTION INTRAMUSCULAR; INTRAVENOUS; SUBCUTANEOUS at 10:32

## 2025-02-21 RX ADMIN — SODIUM CHLORIDE 50 ML/HR: 9 INJECTION, SOLUTION INTRAVENOUS at 07:32

## 2025-02-21 RX ADMIN — FENTANYL CITRATE 100 MCG/HR: 50 INJECTION, SOLUTION INTRAMUSCULAR; INTRAVENOUS at 07:36

## 2025-02-21 RX ADMIN — HYDROMORPHONE HYDROCHLORIDE 0.5 MG: 1 INJECTION, SOLUTION INTRAMUSCULAR; INTRAVENOUS; SUBCUTANEOUS at 21:26

## 2025-02-21 RX ADMIN — ONDANSETRON HYDROCHLORIDE 4 MG: 2 INJECTION, SOLUTION INTRAMUSCULAR; INTRAVENOUS at 12:53

## 2025-02-21 RX ADMIN — DEXAMETHASONE SODIUM PHOSPHATE 4 MG: 4 INJECTION INTRA-ARTICULAR; INTRALESIONAL; INTRAMUSCULAR; INTRAVENOUS; SOFT TISSUE at 07:37

## 2025-02-21 RX ADMIN — MAGNESIUM SULFATE HEPTAHYDRATE 2000 MG: 40 INJECTION, SOLUTION INTRAVENOUS at 11:26

## 2025-02-21 RX ADMIN — Medication 200 MCG: at 10:05

## 2025-02-21 RX ADMIN — Medication 120 MCG: at 07:39

## 2025-02-21 RX ADMIN — EPINEPHRINE 8 MCG: 1 INJECTION INTRAMUSCULAR; INTRAVENOUS; SUBCUTANEOUS at 10:28

## 2025-02-21 RX ADMIN — PHENYLEPHRINE HYDROCHLORIDE 40 MCG/MIN: 10 INJECTION INTRAVENOUS at 07:32

## 2025-02-21 RX ADMIN — MUPIROCIN: 20 OINTMENT TOPICAL at 15:16

## 2025-02-21 RX ADMIN — HEPARIN SODIUM 30000 UNITS: 1000 INJECTION INTRAVENOUS; SUBCUTANEOUS at 08:56

## 2025-02-21 RX ADMIN — SENNOSIDES AND DOCUSATE SODIUM 1 TABLET: 50; 8.6 TABLET ORAL at 21:32

## 2025-02-21 RX ADMIN — Medication 200 MCG: at 10:16

## 2025-02-21 RX ADMIN — POTASSIUM CHLORIDE 20 MEQ: 29.8 INJECTION, SOLUTION INTRAVENOUS at 14:49

## 2025-02-21 RX ADMIN — MUPIROCIN: 20 OINTMENT TOPICAL at 21:32

## 2025-02-21 RX ADMIN — Medication 200 MCG: at 10:40

## 2025-02-21 RX ADMIN — ACETAMINOPHEN 1000 MG: 500 TABLET, FILM COATED ORAL at 14:55

## 2025-02-21 RX ADMIN — SODIUM CHLORIDE, PRESERVATIVE FREE 10 ML: 5 INJECTION INTRAVENOUS at 21:33

## 2025-02-21 RX ADMIN — SODIUM CHLORIDE 10 G/HR: 900 INJECTION, SOLUTION INTRAVENOUS at 07:32

## 2025-02-21 RX ADMIN — PROTAMINE SULFATE 250 MG: 10 INJECTION, SOLUTION INTRAVENOUS at 11:07

## 2025-02-21 RX ADMIN — PROPOFOL 50 MG: 10 INJECTION, EMULSION INTRAVENOUS at 08:19

## 2025-02-21 RX ADMIN — ACETAMINOPHEN 1000 MG: 500 TABLET, FILM COATED ORAL at 21:06

## 2025-02-21 RX ADMIN — ACETAMINOPHEN 1000 MG: 500 TABLET, FILM COATED ORAL at 06:08

## 2025-02-21 RX ADMIN — POTASSIUM CHLORIDE 20 MEQ: 29.8 INJECTION, SOLUTION INTRAVENOUS at 16:06

## 2025-02-21 RX ADMIN — LIDOCAINE HYDROCHLORIDE 100 MG: 20 INJECTION, SOLUTION EPIDURAL; INFILTRATION; INTRACAUDAL; PERINEURAL at 07:36

## 2025-02-21 RX ADMIN — ROCURONIUM BROMIDE 100 MG: 10 INJECTION INTRAVENOUS at 07:37

## 2025-02-21 RX ADMIN — SODIUM BICARBONATE 50 MEQ: 84 INJECTION, SOLUTION INTRAVENOUS at 10:33

## 2025-02-21 RX ADMIN — ALBUMIN (HUMAN) 12.5 G: 12.5 INJECTION, SOLUTION INTRAVENOUS at 14:35

## 2025-02-21 RX ADMIN — Medication 50 MCG: at 11:50

## 2025-02-21 RX ADMIN — HEPARIN SODIUM 5000 UNITS: 1000 INJECTION INTRAVENOUS; SUBCUTANEOUS at 10:07

## 2025-02-21 RX ADMIN — SODIUM CHLORIDE: 0.45 INJECTION, SOLUTION INTRAVENOUS at 14:03

## 2025-02-21 RX ADMIN — Medication 200 MCG: at 10:32

## 2025-02-21 RX ADMIN — DEXMEDETOMIDINE 0.3 MCG/KG/HR: 100 INJECTION, SOLUTION INTRAVENOUS at 07:32

## 2025-02-21 RX ADMIN — ALBUMIN (HUMAN) 12.5 G: 12.5 INJECTION, SOLUTION INTRAVENOUS at 13:18

## 2025-02-21 RX ADMIN — SODIUM CHLORIDE, POTASSIUM CHLORIDE, SODIUM LACTATE AND CALCIUM CHLORIDE 1000 ML: 600; 310; 30; 20 INJECTION, SOLUTION INTRAVENOUS at 20:52

## 2025-02-21 RX ADMIN — FENTANYL CITRATE 200 MCG: 50 INJECTION INTRAMUSCULAR; INTRAVENOUS at 07:37

## 2025-02-21 RX ADMIN — Medication 200 MCG: at 10:08

## 2025-02-21 RX ADMIN — ALBUMIN (HUMAN) 12.5 G: 12.5 INJECTION, SOLUTION INTRAVENOUS at 16:17

## 2025-02-21 RX ADMIN — CEFAZOLIN 2 G: 1 INJECTION, POWDER, FOR SOLUTION INTRAMUSCULAR; INTRAVENOUS; PARENTERAL at 08:04

## 2025-02-21 RX ADMIN — Medication 120 MCG: at 07:36

## 2025-02-21 RX ADMIN — Medication 10 MG: at 09:36

## 2025-02-21 RX ADMIN — PROPOFOL 50 MG: 10 INJECTION, EMULSION INTRAVENOUS at 07:36

## 2025-02-21 RX ADMIN — EPINEPHRINE 8 MCG: 1 INJECTION INTRAMUSCULAR; INTRAVENOUS; SUBCUTANEOUS at 10:05

## 2025-02-21 RX ADMIN — Medication 100 MCG: at 08:49

## 2025-02-21 RX ADMIN — Medication 100 MCG: at 08:00

## 2025-02-21 RX ADMIN — EPINEPHRINE 8 MCG: 1 INJECTION INTRAMUSCULAR; INTRAVENOUS; SUBCUTANEOUS at 09:26

## 2025-02-21 RX ADMIN — WATER 2000 MG: 1 INJECTION INTRAMUSCULAR; INTRAVENOUS; SUBCUTANEOUS at 20:12

## 2025-02-21 RX ADMIN — PHENYLEPHRINE HYDROCHLORIDE 90 MCG/MIN: 10 INJECTION INTRAVENOUS at 20:29

## 2025-02-21 RX ADMIN — CHLORHEXIDINE GLUCONATE 15 ML: 1.2 RINSE ORAL at 15:00

## 2025-02-21 RX ADMIN — Medication 5 MG: at 08:14

## 2025-02-21 RX ADMIN — Medication 100 MCG: at 08:59

## 2025-02-21 RX ADMIN — SODIUM CHLORIDE, POTASSIUM CHLORIDE, SODIUM LACTATE AND CALCIUM CHLORIDE 1000 ML: 600; 310; 30; 20 INJECTION, SOLUTION INTRAVENOUS at 23:30

## 2025-02-21 RX ADMIN — GABAPENTIN 200 MG: 100 CAPSULE ORAL at 21:06

## 2025-02-21 RX ADMIN — Medication 50 MCG: at 12:03

## 2025-02-21 RX ADMIN — EPINEPHRINE 8 MCG: 1 INJECTION INTRAMUSCULAR; INTRAVENOUS; SUBCUTANEOUS at 10:16

## 2025-02-21 RX ADMIN — EPINEPHRINE 8 MCG: 1 INJECTION INTRAMUSCULAR; INTRAVENOUS; SUBCUTANEOUS at 09:28

## 2025-02-21 RX ADMIN — Medication 80 MCG: at 07:47

## 2025-02-21 RX ADMIN — EPINEPHRINE 8 MCG: 1 INJECTION INTRAMUSCULAR; INTRAVENOUS; SUBCUTANEOUS at 10:25

## 2025-02-21 RX ADMIN — SODIUM BICARBONATE 50 MEQ: 84 INJECTION, SOLUTION INTRAVENOUS at 10:53

## 2025-02-21 RX ADMIN — Medication 100 MCG: at 08:42

## 2025-02-21 RX ADMIN — ALBUMIN (HUMAN) 12.5 G: 12.5 INJECTION, SOLUTION INTRAVENOUS at 20:02

## 2025-02-21 RX ADMIN — ATORVASTATIN CALCIUM 40 MG: 40 TABLET, FILM COATED ORAL at 21:31

## 2025-02-21 RX ADMIN — Medication 200 MCG: at 10:03

## 2025-02-21 RX ADMIN — GABAPENTIN 300 MG: 300 CAPSULE ORAL at 06:08

## 2025-02-21 RX ADMIN — FENTANYL CITRATE 50 MCG: 50 INJECTION INTRAMUSCULAR; INTRAVENOUS at 07:25

## 2025-02-21 RX ADMIN — Medication 10 MG: at 11:16

## 2025-02-21 RX ADMIN — ONDANSETRON 4 MG: 2 INJECTION, SOLUTION INTRAMUSCULAR; INTRAVENOUS at 16:28

## 2025-02-21 RX ADMIN — EPINEPHRINE 8 MCG: 1 INJECTION INTRAMUSCULAR; INTRAVENOUS; SUBCUTANEOUS at 10:07

## 2025-02-21 RX ADMIN — SODIUM CHLORIDE, POTASSIUM CHLORIDE, SODIUM LACTATE AND CALCIUM CHLORIDE: 600; 310; 30; 20 INJECTION, SOLUTION INTRAVENOUS at 07:36

## 2025-02-21 RX ADMIN — Medication 100 MCG: at 07:55

## 2025-02-21 RX ADMIN — Medication 100 MCG: at 10:13

## 2025-02-21 RX ADMIN — Medication 100 MCG: at 09:01

## 2025-02-21 RX ADMIN — CHLORHEXIDINE GLUCONATE 15 ML: 1.2 RINSE ORAL at 21:31

## 2025-02-21 RX ADMIN — MIDAZOLAM 3 MG: 1 INJECTION INTRAMUSCULAR; INTRAVENOUS at 07:36

## 2025-02-21 RX ADMIN — SODIUM CHLORIDE 2.2 UNITS/HR: 9 INJECTION, SOLUTION INTRAVENOUS at 10:05

## 2025-02-21 RX ADMIN — Medication 120 MCG: at 07:42

## 2025-02-21 RX ADMIN — NITROGLYCERIN 1 INCH: 20 OINTMENT TOPICAL at 04:11

## 2025-02-21 RX ADMIN — EPINEPHRINE 1 MCG/MIN: 1 INJECTION INTRAMUSCULAR; INTRAVENOUS; SUBCUTANEOUS at 09:04

## 2025-02-21 RX ADMIN — MIDAZOLAM 2 MG: 1 INJECTION INTRAMUSCULAR; INTRAVENOUS at 07:25

## 2025-02-21 RX ADMIN — CEFAZOLIN 2 G: 1 INJECTION, POWDER, FOR SOLUTION INTRAMUSCULAR; INTRAVENOUS; PARENTERAL at 12:04

## 2025-02-21 RX ADMIN — CALCIUM CHLORIDE 1000 MG: 100 INJECTION, SOLUTION INTRAVENOUS at 23:56

## 2025-02-21 ASSESSMENT — PAIN DESCRIPTION - LOCATION
LOCATION: INCISION

## 2025-02-21 ASSESSMENT — PAIN SCALES - GENERAL
PAINLEVEL_OUTOF10: 0
PAINLEVEL_OUTOF10: 0
PAINLEVEL_OUTOF10: 2
PAINLEVEL_OUTOF10: 8
PAINLEVEL_OUTOF10: 0
PAINLEVEL_OUTOF10: 0
PAINLEVEL_OUTOF10: 1

## 2025-02-21 ASSESSMENT — PAIN DESCRIPTION - DESCRIPTORS
DESCRIPTORS: ACHING

## 2025-02-21 ASSESSMENT — PAIN DESCRIPTION - ORIENTATION
ORIENTATION: MID

## 2025-02-21 ASSESSMENT — PAIN - FUNCTIONAL ASSESSMENT
PAIN_FUNCTIONAL_ASSESSMENT: ACTIVITIES ARE NOT PREVENTED
PAIN_FUNCTIONAL_ASSESSMENT: PREVENTS OR INTERFERES SOME ACTIVE ACTIVITIES AND ADLS
PAIN_FUNCTIONAL_ASSESSMENT: ACTIVITIES ARE NOT PREVENTED

## 2025-02-21 ASSESSMENT — PAIN DESCRIPTION - PAIN TYPE
TYPE: SURGICAL PAIN

## 2025-02-21 ASSESSMENT — PULMONARY FUNCTION TESTS
PIF_VALUE: 21
PIF_VALUE: 17

## 2025-02-21 NOTE — ANESTHESIA PROCEDURE NOTES
Procedure Performed: FARIDA       Start Time:  2/21/2025 12:32 PM       End Time:      Anesthesia Information  Performed Personally  Anesthesiologist:  Clint Lamar MD        Preanesthesia Checklist:  Patient identified, IV assessed, risks and benefits discussed, monitors and equipment assessed, procedure being performed at surgeon's request and anesthesia consent obtained.    General Procedure Information  Diagnostic Indications for Echo:  assessment of ascending aorta, assessment of surgical repair, hemodynamic monitoring and assessment of valve function  Location performed:  OR  Intubated  Bite block placed  Heart visualized  Probe Insertion:  Easy  Probe Type:  3D, mulitplane, epiaortic and biplane  Modalities:  2D, 3D, color flow mapping, continuous wave Doppler, pulse wave Doppler and M-mode    Echocardiographic and Doppler Measurements    Ventricles    Ventricle  Cavity Size  Cavity          Dimension  Hypertrophy  Thrombus  Global FXN  EF    RV  normal    No  No  normal      LV  normal    Yes  No  normal (50-70%)       Ventricular Findings Comments:  LVEF 50% low normal    Ventricular Regional Function:  1- Basal Anteroseptal:  normal  2- Basal Anterior:  normal  3- Basal Anterolateral:  normal  4- Basal Inferolateral:  normal  5- Basal Inferior:  normal  6- Basal Inferoseptal:  normal  7- Mid Anteroseptal:  normal  8- Mid Anterior:  normal  9- Mid Anterolateral:  normal  10- Mid Inferolateral:  normal  11- Mid Inferior:  normal  12- Mid Inferoseptal:  normal  13- Apical Anterior:  normal  14- Apical Lateral:  normal  15- Apical Inferior:  normal  16- Apical Septal:  normal  17- [place]:  normal    Ventricular Wall Motion Comments:  No WMA    Valves     Valves  Annulus  Stenosis Measurements   Regurg  Leaflet   Morph  Leaflet   Motion Valve Comments    Aortic normal Stenosis none.            trace   normal normal Tri-leaflet AV with no stenosis and trace AI.      Mitral normal none             none

## 2025-02-21 NOTE — ANESTHESIA POSTPROCEDURE EVALUATION
Post-Anesthesia Evaluation and Assessment    Patient: Alexandr Lewis MRN: [id number]  SSN: [id number]    YOB: 1940  Age: 84 y.o.  Sex: male      I have evaluated the patient and they are stable in the ICU.     Cardiovascular Function/Vital Signs  Visit Vitals  BP (!) 91/58   Pulse 77   Temp (!) 93.2 °F (34 °C)   Resp 14   Ht 1.651 m (5' 5\")   Wt 73.1 kg (161 lb 2.5 oz)   SpO2 100%   BMI 26.82 kg/m²       Patient is status post General anesthesia for Procedure(s):  OFF PUMP CORONARY ARTERY BYPASS GRAFT (CABG) x3 WITH ENDOSCOPIC VEIN HARVESTING (EVH), LEFT ATRIAL APPENDAGE LIGATION WITH CLIP, WITH CARDIOPLEGIA (NO PAC)(E.R.A.S.) INTRAOPERATIVE FARIDA DONE BY AMBER MORGAN. EVH DONE BY AMBER AVITIA.    Nausea/Vomiting: None    Postoperative hydration reviewed and adequate.    Pain:  Managed    Neurological Status:   Intubated and sedated     Pulmonary Status:   Intubated with adequate ventilation and oxygenation     Complications related to anesthesia: None    Post-anesthesia assessment completed. No concerns    Signed By: Clint Morgan MD     February 21, 2025

## 2025-02-21 NOTE — CARE COORDINATION
Transition of Care Plan:    RUR: 17% \"medium risk\"  Prior Level of Functioning: Independent with ADL's  Disposition: Home with family support   STEPHANIE: 2/24  If SNF or IPR: Date FOC offered: N/A  Follow up appointments: PCP/Specialists as indicated  DME needed: None - pt has a cane and walker at home   Transportation at discharge: Family to transport   IM/IMM Medicare/ letter given: 2nd IM needed prior to d/c   Is patient a  and connected with VA? No  Caregiver Contact: Alexandra Haney (niece), [phone]  Discharge Caregiver contacted prior to discharge? To be contacted  Care Conference needed? Not at this time   Barriers to discharge: CTS     1520 PM: Chart reviewed. CM following for d/c planning. Pt is POD 0. CM to continue to follow.    MEGHNA Ingram  Care Management  [facility]   [phone]

## 2025-02-21 NOTE — OP NOTE
Operative Note    Alexandr Lewis  [id number]  84 y.o.  male  [unfilled]  MRM HEART OR M6      PREOP-DIAGNOSIS: Coronary artery disease, hypertension, CKD 2       POST OP DIAGNOSIS: Same    PROCEDURE:  Coronary artery bypass grafting surgery x 3, off-pump, utilizing LIMA to distal LAD, saphenous vein to OM1, saphenous vein to PDA..  Occlusion of left atrial appendage with a 40 mm atriclip.  Saphenectomy right thigh utilizing endoscopic vein harvest technique.    SURGEON:  Surgeons and Role:     * Zach Lucia MD - Primary    ASSISTANT: MARCIA Christina PA-C.  Was present during the entire procedure, no residents were available to assist.  he performed the endoscopic vein harvest and helped with construction of anastomoses and closure.    Anesthesia: GETA    Estimated Blood Loss: 500 cc           Drains:   Chest Tube Anterior Mediastinal 1 (Active)   Output (ml) 0 ml 02/21/25 1530       Urinary Catheter 02/21/25 Sahu-Temperature (Active)   Output (mL) 20 mL 02/21/25 1530       [REMOVED] NG/OG/NJ/NE Tube Nasogastric 16 fr Left nostril (Removed)   Surrounding Skin Clean, dry & intact 02/06/25 0845   Securement device Tape 02/06/25 0845   Status Suction-low intermittent 02/06/25 0845   Placement Verified X-Ray (Initial) 02/06/25 0845   Drainage Appearance Clear 02/06/25 0845              Blood Products Administered: None           Specimens: * No specimens in log *         Complications:  None; patient tolerated the procedure well.           Disposition: ICU - intubated and hemodynamically stable.           Condition: stable      The patient was brought to the OR, placed in a supine position.  Once the general endotracheal anesthesia obtained, a time-out was done.  IV antibiotics given at this time.  The patient was then prepped and draped in a sterile standard fashion.   Right side saphenectomy was performed utilizing endoscopic vein harvest technique.  This is the greater saphenous vein.  This incision was then closed in

## 2025-02-21 NOTE — CONSULTS
Name: Alexandr Lewis   : 1940   MRN: [id number]   Date: 2025        Chief Complaint   Patient presents with    Chest Pain     Patient reports upper mid-sternal pain, patient reports \"gas pain\" and says he's been taking tums for symptoms - tonight they are not helping         HPI:     Alexandr Lewis is 84 y.o. male with h/o SBO, inguinal hernia status post mesh repair , CKD stage III, hypertension, hyperlipidemia.    Patient presented to the emergency department 25 with complaints of acute onset chest pressure/burning. 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  Postoperatively admitted to CVICU.  Critical care consult is requested for comanagement.  Patient is currently intubated, sedation is being weaned off.  I obtained all the history from chart review.    Assessment/Plan:     Post cardiac surgery care: S/P CABG x 3 and left atrial appendage ligation.  - Evaluation of postoperative labs and imaging studies have been performed.   - Keep on full ventilatory support for the time being and wean as tolerated when more awake.   - If unable to wean vent and patient requires increasing vent support will Rx with sedation and analgesia to facilitate vent synchrony.   - HOB elevated to 30 degrees with VAP bundle.   - Hemodynamic management per CT surgery postoperative guidelines.   - ASA and statin for CAD Rx when feasible.   - When hemodynamically tolerated will also consider initiation of beta blockers.   - Insulin for glycemic control.    Shock. Vasoplegic vs cardiogenic.  - Wean pressors and inotropes as able.   - hemodynamic management in collaboration with CT surgery    Ventilator Management.  - Keep on full ventilatory support for the time being and wean as tolerated when more awake.   - If unable to wean vent and patient requires increasing vent support will Rx with sedation and analgesia to facilitate vent synchrony.   - Post-extubation pulmonary toilette with adequate

## 2025-02-21 NOTE — ANESTHESIA PROCEDURE NOTES
Arterial Line:    An arterial line was placed using surface landmarks, in the pre-op for the following indication(s): continuous blood pressure monitoring and blood sampling needed.    A 20 gauge (size) (length), Arrow (type) catheter was placed, Seldinger technique used, into the right radial artery, secured by Tegaderm.  Anesthesia type: Local  Local infiltration: Injection    Events:  patient tolerated procedure well with no complications.2/21/2025 7:00 AM  Anesthesiologist: Clint Lamar MD  Performed: Anesthesiologist   Preanesthetic Checklist  Completed: patient identified, IV checked, site marked, risks and benefits discussed, surgical/procedural consents, equipment checked, pre-op evaluation, timeout performed, anesthesia consent given, oxygen available, monitors applied/VS acknowledged and fire risk safety assessment completed and verbalized

## 2025-02-21 NOTE — BRIEF OP NOTE
BRIEF OP NOTE  Pre-Op Diagnosis: Coronary artery disease [I25.10]    Post-Op Diagnosis: Coronary artery disease [I25.10]      Procedure: Procedure(s):  OFF PUMP CABG X 3 LIMA to LAD, RSVG to OM, RSVG to PDA  RIGHT ENDOSCOPIC SAPHENOUS VEIN HARVEST  LEFT ATRIAL APPENDAGE LIGATION WITH ATRICLIP    Surgeon:  Dr. Khari MD    Assistant(s): Clint Christina PA-C    Anesthesia: General      Infusions:   Cy, Precedex, Insulin     Estimated Blood Loss: 250 ml     Cell Saver: 400 ml     Specimens: * No specimens in log *    Drains and pacing wires: 2 32 Fr Drains, (1 Mediastinal, 1 L Pleural)     Wires completed by: Nicky Christina PA-C    Sternal incision closed by: Clint Christina PA-C    Leg incision closed by: Clint Christina PA-C    Complications: none    Findings: See full operative note.    Implants:   Implant Name Type Inv. Item Serial No.  Lot No. LRB No. Used Action   CLIP MED SUTURE LESS 40 MM SYS 1 HND STRL [employer] V - SN/A  CLIP MED SUTURE LESS 40 MM SYS 1 HND STRL ATRICLIP FLX V N/A [employer]- [id number] Left 1 Implanted

## 2025-02-21 NOTE — ANESTHESIA PROCEDURE NOTES
Central Venous Line:    A central venous line was placed using ultrasound guidance and surface landmarks, in the pre-op for the following indication(s): central venous access and CVP monitoring.2/21/2025 7:25 AM    Sterility preparation included the following: provider used sterile gloves, gown, hat and mask, hand hygiene performed prior to central venous catheter insertion, sterile gel and probe cover used in ultrasound-guided central venous catheter insertion and maximum sterile barriers used during central venous catheter insertion.    The patient was placed in Trendelenburg position.The right internal jugular vein was prepped.    The site was prepped with Chloraprep.  A 9 Fr (size), 12 (length), introducer double lumen was placed.    During the procedure, the following specific steps were taken: target vein identified, needle advanced into vein and blood aspirated and guidewire advanced into vein.    Intravenous verification was obtained by ultrasound, venous blood return and manometry.    Post insertion care included: all ports aspirated, all ports flushed easily, guidewire removed intact, Biopatch applied, line sutured in place and dressing applied.    During the procedure the patient experienced: patient tolerated procedure well with no complications.      Outcomes: uncomplicated  Anesthesia type: local..No    Additional notes:  Assisted by SRNA  Staffing  Performed: Anesthesiologist   Anesthesiologist: Clint Lamar MD  Performed by: Clint Lamar MD  Authorized by: Clint Lamar MD    Preanesthetic Checklist  Completed: patient identified, IV checked, site marked, risks and benefits discussed, surgical/procedural consents, equipment checked, pre-op evaluation, timeout performed, anesthesia consent given, oxygen available, monitors applied/VS acknowledged and fire risk safety assessment completed and verbalized

## 2025-02-21 NOTE — PERIOP NOTE
Beta Blocker given on 02/20/2025 at 0949  Antibiotics given prior to incision - see anesthesia notes     Sacral padding applied in preop     1000 - CVICU notified of procedure status. Spoke with Mac.    1100 - Nursing handoff given to CVICU.    TRANSFER - OUT REPORT:    Verbal report given to Callie Beauchamp RN on Alexandr Lewis  being transferred to CVICU for routine post-op       Report consisted of patient's Situation, Background, Assessment and   Recommendations(SBAR).     Information from the following report(s) Nurse Handoff Report, Procedure Verification, and Quality Measures was reviewed with the receiving nurse.           Lines:   Peripheral IV Right;Dorsal Cephalic (Active)   Site Assessment Clean, dry & intact 02/21/25 0613   Line Status Blood return noted;Flushed 02/21/25 0400   Line Care Cap changed 02/21/25 0613   Phlebitis Assessment No symptoms 02/21/25 0613   Infiltration Assessment 0 02/21/25 0613   Alcohol Cap Used Yes 02/21/25 0613   Dressing Status Clean, dry & intact 02/21/25 0613   Dressing Type Transparent 02/21/25 0613       Peripheral IV 02/14/25 Left Cephalic (Active)   Site Assessment Clean, dry & intact 02/21/25 0400   Line Status Infusing 02/21/25 0400   Line Care Connections checked and tightened 02/21/25 0400   Phlebitis Assessment No symptoms 02/21/25 0400   Infiltration Assessment 0 02/21/25 0400   Dressing Status Clean, dry & intact 02/21/25 0400   Dressing Type Transparent 02/21/25 0400       Arterial Line 02/21/25 Radial (Active)       Introducer 02/21/25 (Active)        Opportunity for questions and clarification was provided.      Patient transported with:  Monitor, O2 @ 10lpm, and Registered Nurse

## 2025-02-21 NOTE — ANESTHESIA PRE PROCEDURE
INGUINAL HERNIA REPAIR Bilateral 2024    1. Laparoscopic repair of bilateral inguinal hernia with mesh, robot assisted 2. Incarcerated anterior abdominal hernia repair 4 CM performed by Case Pryor MD at [facility] MAIN OR    INVASIVE VASCULAR N/A 2025    Ultrasound guided vascular access performed by Mateo Calvin III, DO at [facility] CARDIAC CATH LAB    ORTHOPEDIC SURGERY  2000    L SHOULDER-UNKNOWN PROCEDURE    PROSTATE BIOPSY, NEEDLE, SATURATION SAMPLING      PROSTATECTOMY         Social History:    Social History     Tobacco Use    Smoking status: Former     Types: Cigarettes     Start date:      Quit date:      Years since quittin.1    Smokeless tobacco: Former     Types: Chew     Quit date:    Substance Use Topics    Alcohol use: Not Currently                                Counseling given: Not Answered      Vital Signs (Current):   Vitals:    25 0200 25 0300 25 0400 25 0500   BP: 127/61 (!) 99/51 (!) 116/54 128/61   Pulse: 50 (!) 46 55 53   Resp:       Temp:   97.9 °F (36.6 °C)    TempSrc:   Oral    SpO2: 98% 98% 97% 98%   Weight:       Height:                                                  BP Readings from Last 3 Encounters:   25 128/61   25 121/66   24 114/66       NPO Status: Time of last liquid consumption: 615                        Time of last solid consumption: 1800                        Date of last liquid consumption: 25                        Date of last solid food consumption: 25    BMI:   Wt Readings from Last 3 Encounters:   25 73.1 kg (161 lb 2.5 oz)   25 71.4 kg (157 lb 6.5 oz)   24 73.3 kg (161 lb 9.6 oz)     Body mass index is 26.82 kg/m².    CBC:   Lab Results   Component Value Date/Time    WBC 5.4 2025 04:17 AM    RBC 3.28 2025 04:17 AM    HGB 8.7 2025 04:17 AM    HCT 26.8 2025 04:17 AM    MCV 81.7 2025 04:17 AM    RDW 15.0 2025 04:17 AM

## 2025-02-22 ENCOUNTER — APPOINTMENT (OUTPATIENT)
Facility: HOSPITAL | Age: 85
DRG: 234 | End: 2025-02-22
Payer: MEDICARE

## 2025-02-22 LAB
ACUTE KIDNEY INJURY RISK NEPHROCHECK: 0.1 (ref 0–0.3)
ANION GAP SERPL CALC-SCNC: 4 MMOL/L (ref 2–12)
ANION GAP SERPL CALC-SCNC: 5 MMOL/L (ref 2–12)
BUN SERPL-MCNC: 29 MG/DL (ref 6–20)
BUN SERPL-MCNC: 31 MG/DL (ref 6–20)
BUN/CREAT SERPL: 16 (ref 12–20)
BUN/CREAT SERPL: 17 (ref 12–20)
CALCIUM SERPL-MCNC: 8.1 MG/DL (ref 8.5–10.1)
CALCIUM SERPL-MCNC: 8.2 MG/DL (ref 8.5–10.1)
CHLORIDE SERPL-SCNC: 115 MMOL/L (ref 97–108)
CHLORIDE SERPL-SCNC: 115 MMOL/L (ref 97–108)
CO2 SERPL-SCNC: 21 MMOL/L (ref 21–32)
CO2 SERPL-SCNC: 22 MMOL/L (ref 21–32)
CREAT SERPL-MCNC: 1.85 MG/DL (ref 0.7–1.3)
CREAT SERPL-MCNC: 1.85 MG/DL (ref 0.7–1.3)
ERYTHROCYTE [DISTWIDTH] IN BLOOD BY AUTOMATED COUNT: 15 % (ref 11.5–14.5)
GLUCOSE BLD STRIP.AUTO-MCNC: 107 MG/DL (ref 65–117)
GLUCOSE BLD STRIP.AUTO-MCNC: 113 MG/DL (ref 65–117)
GLUCOSE BLD STRIP.AUTO-MCNC: 115 MG/DL (ref 65–117)
GLUCOSE BLD STRIP.AUTO-MCNC: 119 MG/DL (ref 65–117)
GLUCOSE BLD STRIP.AUTO-MCNC: 123 MG/DL (ref 65–117)
GLUCOSE BLD STRIP.AUTO-MCNC: 126 MG/DL (ref 65–117)
GLUCOSE BLD STRIP.AUTO-MCNC: 130 MG/DL (ref 65–117)
GLUCOSE BLD STRIP.AUTO-MCNC: 132 MG/DL (ref 65–117)
GLUCOSE BLD STRIP.AUTO-MCNC: 138 MG/DL (ref 65–117)
GLUCOSE BLD STRIP.AUTO-MCNC: 142 MG/DL (ref 65–117)
GLUCOSE BLD STRIP.AUTO-MCNC: 146 MG/DL (ref 65–117)
GLUCOSE BLD STRIP.AUTO-MCNC: 80 MG/DL (ref 65–117)
GLUCOSE BLD STRIP.AUTO-MCNC: 89 MG/DL (ref 65–117)
GLUCOSE SERPL-MCNC: 114 MG/DL (ref 65–100)
GLUCOSE SERPL-MCNC: 116 MG/DL (ref 65–100)
HCT VFR BLD AUTO: 26.4 % (ref 36.6–50.3)
HGB BLD-MCNC: 8.3 G/DL (ref 12.1–17)
MAGNESIUM SERPL-MCNC: 1.8 MG/DL (ref 1.6–2.4)
MAGNESIUM SERPL-MCNC: 2.1 MG/DL (ref 1.6–2.4)
MCH RBC QN AUTO: 26.1 PG (ref 26–34)
MCHC RBC AUTO-ENTMCNC: 31.4 G/DL (ref 30–36.5)
MCV RBC AUTO: 83 FL (ref 80–99)
NRBC # BLD: 0 K/UL (ref 0–0.01)
NRBC BLD-RTO: 0 PER 100 WBC
PHOSPHATE SERPL-MCNC: 4.1 MG/DL (ref 2.6–4.7)
PHOSPHATE SERPL-MCNC: 4.6 MG/DL (ref 2.6–4.7)
PLATELET # BLD AUTO: 148 K/UL (ref 150–400)
PMV BLD AUTO: 11.2 FL (ref 8.9–12.9)
POTASSIUM SERPL-SCNC: 4.9 MMOL/L (ref 3.5–5.1)
POTASSIUM SERPL-SCNC: 5 MMOL/L (ref 3.5–5.1)
RBC # BLD AUTO: 3.18 M/UL (ref 4.1–5.7)
SERVICE CMNT-IMP: ABNORMAL
SERVICE CMNT-IMP: NORMAL
SODIUM SERPL-SCNC: 141 MMOL/L (ref 136–145)
SODIUM SERPL-SCNC: 141 MMOL/L (ref 136–145)
WBC # BLD AUTO: 14 K/UL (ref 4.1–11.1)

## 2025-02-22 PROCEDURE — 85027 COMPLETE CBC AUTOMATED: CPT

## 2025-02-22 PROCEDURE — 6370000000 HC RX 637 (ALT 250 FOR IP): Performed by: PHYSICIAN ASSISTANT

## 2025-02-22 PROCEDURE — 36415 COLL VENOUS BLD VENIPUNCTURE: CPT

## 2025-02-22 PROCEDURE — 6360000002 HC RX W HCPCS: Performed by: PHYSICIAN ASSISTANT

## 2025-02-22 PROCEDURE — 97162 PT EVAL MOD COMPLEX 30 MIN: CPT

## 2025-02-22 PROCEDURE — 6360000002 HC RX W HCPCS: Performed by: NURSE PRACTITIONER

## 2025-02-22 PROCEDURE — 97530 THERAPEUTIC ACTIVITIES: CPT

## 2025-02-22 PROCEDURE — 97530 THERAPEUTIC ACTIVITIES: CPT | Performed by: OCCUPATIONAL THERAPIST

## 2025-02-22 PROCEDURE — 84100 ASSAY OF PHOSPHORUS: CPT

## 2025-02-22 PROCEDURE — 80048 BASIC METABOLIC PNL TOTAL CA: CPT

## 2025-02-22 PROCEDURE — 6360000002 HC RX W HCPCS: Performed by: THORACIC SURGERY (CARDIOTHORACIC VASCULAR SURGERY)

## 2025-02-22 PROCEDURE — 71045 X-RAY EXAM CHEST 1 VIEW: CPT

## 2025-02-22 PROCEDURE — 2580000003 HC RX 258: Performed by: PHYSICIAN ASSISTANT

## 2025-02-22 PROCEDURE — 2500000003 HC RX 250 WO HCPCS: Performed by: PHYSICIAN ASSISTANT

## 2025-02-22 PROCEDURE — 97165 OT EVAL LOW COMPLEX 30 MIN: CPT | Performed by: OCCUPATIONAL THERAPIST

## 2025-02-22 PROCEDURE — 2500000003 HC RX 250 WO HCPCS: Performed by: THORACIC SURGERY (CARDIOTHORACIC VASCULAR SURGERY)

## 2025-02-22 PROCEDURE — P9045 ALBUMIN (HUMAN), 5%, 250 ML: HCPCS | Performed by: PHYSICIAN ASSISTANT

## 2025-02-22 PROCEDURE — 82962 GLUCOSE BLOOD TEST: CPT

## 2025-02-22 PROCEDURE — 83735 ASSAY OF MAGNESIUM: CPT

## 2025-02-22 PROCEDURE — 2100000001 HC CVICU R&B

## 2025-02-22 PROCEDURE — 2000000000 HC ICU R&B

## 2025-02-22 PROCEDURE — 6370000000 HC RX 637 (ALT 250 FOR IP): Performed by: THORACIC SURGERY (CARDIOTHORACIC VASCULAR SURGERY)

## 2025-02-22 RX ORDER — NOREPINEPHRINE BITARTRATE 0.06 MG/ML
1-100 INJECTION, SOLUTION INTRAVENOUS CONTINUOUS
Status: DISCONTINUED | OUTPATIENT
Start: 2025-02-22 | End: 2025-02-27 | Stop reason: HOSPADM

## 2025-02-22 RX ORDER — ALBUMIN HUMAN 50 G/1000ML
12.5 SOLUTION INTRAVENOUS ONCE
Status: COMPLETED | OUTPATIENT
Start: 2025-02-22 | End: 2025-02-22

## 2025-02-22 RX ORDER — HYDROCORTISONE SODIUM SUCCINATE 100 MG/2ML
50 INJECTION INTRAMUSCULAR; INTRAVENOUS EVERY 6 HOURS
Status: DISCONTINUED | OUTPATIENT
Start: 2025-02-22 | End: 2025-02-23

## 2025-02-22 RX ORDER — MAGNESIUM SULFATE IN WATER 40 MG/ML
2000 INJECTION, SOLUTION INTRAVENOUS ONCE
Status: COMPLETED | OUTPATIENT
Start: 2025-02-22 | End: 2025-02-22

## 2025-02-22 RX ADMIN — SODIUM CHLORIDE, PRESERVATIVE FREE 10 ML: 5 INJECTION INTRAVENOUS at 08:13

## 2025-02-22 RX ADMIN — SODIUM CHLORIDE: 0.9 INJECTION, SOLUTION INTRAVENOUS at 03:36

## 2025-02-22 RX ADMIN — MAGNESIUM SULFATE HEPTAHYDRATE 2000 MG: 40 INJECTION, SOLUTION INTRAVENOUS at 06:42

## 2025-02-22 RX ADMIN — CHLORHEXIDINE GLUCONATE 15 ML: 1.2 RINSE ORAL at 21:39

## 2025-02-22 RX ADMIN — CHLORHEXIDINE GLUCONATE 15 ML: 1.2 RINSE ORAL at 08:11

## 2025-02-22 RX ADMIN — Medication 400 MG: at 21:39

## 2025-02-22 RX ADMIN — OXYCODONE 5 MG: 5 TABLET ORAL at 06:52

## 2025-02-22 RX ADMIN — HYDROCORTISONE SODIUM SUCCINATE 50 MG: 100 INJECTION, POWDER, FOR SOLUTION INTRAMUSCULAR; INTRAVENOUS at 08:11

## 2025-02-22 RX ADMIN — ALBUMIN (HUMAN) 12.5 G: 12.5 INJECTION, SOLUTION INTRAVENOUS at 08:19

## 2025-02-22 RX ADMIN — SENNOSIDES AND DOCUSATE SODIUM 1 TABLET: 50; 8.6 TABLET ORAL at 08:11

## 2025-02-22 RX ADMIN — VASOPRESSIN 0.03 UNITS/MIN: 20 INJECTION INTRAVENOUS at 08:23

## 2025-02-22 RX ADMIN — GABAPENTIN 200 MG: 100 CAPSULE ORAL at 13:14

## 2025-02-22 RX ADMIN — ASPIRIN 81 MG: 81 TABLET, CHEWABLE ORAL at 08:11

## 2025-02-22 RX ADMIN — WATER 2000 MG: 1 INJECTION INTRAMUSCULAR; INTRAVENOUS; SUBCUTANEOUS at 08:12

## 2025-02-22 RX ADMIN — PHENYLEPHRINE HYDROCHLORIDE 90 MCG/MIN: 10 INJECTION INTRAVENOUS at 02:21

## 2025-02-22 RX ADMIN — GABAPENTIN 200 MG: 100 CAPSULE ORAL at 08:11

## 2025-02-22 RX ADMIN — AMIODARONE HYDROCHLORIDE 400 MG: 200 TABLET ORAL at 21:40

## 2025-02-22 RX ADMIN — GABAPENTIN 200 MG: 100 CAPSULE ORAL at 21:39

## 2025-02-22 RX ADMIN — ACETAMINOPHEN 1000 MG: 500 TABLET, FILM COATED ORAL at 13:14

## 2025-02-22 RX ADMIN — HYDROCORTISONE SODIUM SUCCINATE 50 MG: 100 INJECTION, POWDER, FOR SOLUTION INTRAMUSCULAR; INTRAVENOUS at 20:01

## 2025-02-22 RX ADMIN — VASOPRESSIN 0.03 UNITS/MIN: 20 INJECTION INTRAVENOUS at 19:27

## 2025-02-22 RX ADMIN — WATER 2000 MG: 1 INJECTION INTRAMUSCULAR; INTRAVENOUS; SUBCUTANEOUS at 20:05

## 2025-02-22 RX ADMIN — POLYETHYLENE GLYCOL 3350 17 G: 17 POWDER, FOR SOLUTION ORAL at 08:13

## 2025-02-22 RX ADMIN — NOREPINEPHRINE BITARTRATE 13 MCG/MIN: 64 SOLUTION INTRAVENOUS at 20:01

## 2025-02-22 RX ADMIN — FAMOTIDINE 20 MG: 20 TABLET, FILM COATED ORAL at 08:11

## 2025-02-22 RX ADMIN — SODIUM CHLORIDE, PRESERVATIVE FREE 10 ML: 5 INJECTION INTRAVENOUS at 21:40

## 2025-02-22 RX ADMIN — Medication 400 MG: at 08:11

## 2025-02-22 RX ADMIN — SENNOSIDES AND DOCUSATE SODIUM 1 TABLET: 50; 8.6 TABLET ORAL at 21:39

## 2025-02-22 RX ADMIN — ATORVASTATIN CALCIUM 40 MG: 40 TABLET, FILM COATED ORAL at 21:39

## 2025-02-22 RX ADMIN — ACETAMINOPHEN 1000 MG: 500 TABLET, FILM COATED ORAL at 06:52

## 2025-02-22 RX ADMIN — ACETAMINOPHEN 1000 MG: 500 TABLET, FILM COATED ORAL at 20:04

## 2025-02-22 RX ADMIN — MUPIROCIN: 20 OINTMENT TOPICAL at 08:12

## 2025-02-22 RX ADMIN — AMIODARONE HYDROCHLORIDE 400 MG: 200 TABLET ORAL at 08:11

## 2025-02-22 RX ADMIN — HYDROCORTISONE SODIUM SUCCINATE 50 MG: 100 INJECTION, POWDER, FOR SOLUTION INTRAMUSCULAR; INTRAVENOUS at 13:14

## 2025-02-22 RX ADMIN — MUPIROCIN: 20 OINTMENT TOPICAL at 21:59

## 2025-02-22 RX ADMIN — NOREPINEPHRINE BITARTRATE 5 MCG/MIN: 64 SOLUTION INTRAVENOUS at 06:09

## 2025-02-22 RX ADMIN — SODIUM CHLORIDE: 0.45 INJECTION, SOLUTION INTRAVENOUS at 03:35

## 2025-02-22 ASSESSMENT — PAIN SCALES - GENERAL
PAINLEVEL_OUTOF10: 5
PAINLEVEL_OUTOF10: 0
PAINLEVEL_OUTOF10: 2
PAINLEVEL_OUTOF10: 2
PAINLEVEL_OUTOF10: 0
PAINLEVEL_OUTOF10: 2

## 2025-02-22 ASSESSMENT — PAIN DESCRIPTION - DESCRIPTORS
DESCRIPTORS: ACHING

## 2025-02-22 ASSESSMENT — PAIN - FUNCTIONAL ASSESSMENT
PAIN_FUNCTIONAL_ASSESSMENT: ACTIVITIES ARE NOT PREVENTED

## 2025-02-22 ASSESSMENT — PAIN DESCRIPTION - LOCATION
LOCATION: INCISION

## 2025-02-22 ASSESSMENT — PAIN DESCRIPTION - PAIN TYPE
TYPE: SURGICAL PAIN

## 2025-02-22 ASSESSMENT — PAIN DESCRIPTION - ORIENTATION
ORIENTATION: MID
ORIENTATION: MID

## 2025-02-23 ENCOUNTER — APPOINTMENT (OUTPATIENT)
Facility: HOSPITAL | Age: 85
DRG: 234 | End: 2025-02-23
Payer: MEDICARE

## 2025-02-23 LAB
ANION GAP SERPL CALC-SCNC: 5 MMOL/L (ref 2–12)
BUN SERPL-MCNC: 41 MG/DL (ref 6–20)
BUN/CREAT SERPL: 17 (ref 12–20)
CALCIUM SERPL-MCNC: 8.2 MG/DL (ref 8.5–10.1)
CHLORIDE SERPL-SCNC: 112 MMOL/L (ref 97–108)
CO2 SERPL-SCNC: 21 MMOL/L (ref 21–32)
CREAT SERPL-MCNC: 2.38 MG/DL (ref 0.7–1.3)
EKG ATRIAL RATE: 60 BPM
EKG ATRIAL RATE: 62 BPM
EKG DIAGNOSIS: NORMAL
EKG DIAGNOSIS: NORMAL
EKG P AXIS: 42 DEGREES
EKG P AXIS: 46 DEGREES
EKG P-R INTERVAL: 132 MS
EKG P-R INTERVAL: 168 MS
EKG Q-T INTERVAL: 436 MS
EKG Q-T INTERVAL: 438 MS
EKG QRS DURATION: 82 MS
EKG QRS DURATION: 86 MS
EKG QTC CALCULATION (BAZETT): 436 MS
EKG QTC CALCULATION (BAZETT): 444 MS
EKG R AXIS: 4 DEGREES
EKG R AXIS: 9 DEGREES
EKG T AXIS: 17 DEGREES
EKG T AXIS: 37 DEGREES
EKG VENTRICULAR RATE: 60 BPM
EKG VENTRICULAR RATE: 62 BPM
ERYTHROCYTE [DISTWIDTH] IN BLOOD BY AUTOMATED COUNT: 15.5 % (ref 11.5–14.5)
GLUCOSE BLD STRIP.AUTO-MCNC: 103 MG/DL (ref 65–117)
GLUCOSE BLD STRIP.AUTO-MCNC: 103 MG/DL (ref 65–117)
GLUCOSE BLD STRIP.AUTO-MCNC: 110 MG/DL (ref 65–117)
GLUCOSE BLD STRIP.AUTO-MCNC: 121 MG/DL (ref 65–117)
GLUCOSE BLD STRIP.AUTO-MCNC: 125 MG/DL (ref 65–117)
GLUCOSE BLD STRIP.AUTO-MCNC: 128 MG/DL (ref 65–117)
GLUCOSE BLD STRIP.AUTO-MCNC: 133 MG/DL (ref 65–117)
GLUCOSE BLD STRIP.AUTO-MCNC: 141 MG/DL (ref 65–117)
GLUCOSE BLD STRIP.AUTO-MCNC: 152 MG/DL (ref 65–117)
GLUCOSE BLD STRIP.AUTO-MCNC: 163 MG/DL (ref 65–117)
GLUCOSE BLD STRIP.AUTO-MCNC: 87 MG/DL (ref 65–117)
GLUCOSE BLD STRIP.AUTO-MCNC: 88 MG/DL (ref 65–117)
GLUCOSE BLD STRIP.AUTO-MCNC: 99 MG/DL (ref 65–117)
GLUCOSE SERPL-MCNC: 86 MG/DL (ref 65–100)
HCT VFR BLD AUTO: 23.5 % (ref 36.6–50.3)
HGB BLD-MCNC: 7.4 G/DL (ref 12.1–17)
MAGNESIUM SERPL-MCNC: 2.7 MG/DL (ref 1.6–2.4)
MCH RBC QN AUTO: 26.8 PG (ref 26–34)
MCHC RBC AUTO-ENTMCNC: 31.5 G/DL (ref 30–36.5)
MCV RBC AUTO: 85.1 FL (ref 80–99)
NRBC # BLD: 0 K/UL (ref 0–0.01)
NRBC BLD-RTO: 0 PER 100 WBC
PHOSPHATE SERPL-MCNC: 4.6 MG/DL (ref 2.6–4.7)
PLATELET # BLD AUTO: 127 K/UL (ref 150–400)
PMV BLD AUTO: 11.5 FL (ref 8.9–12.9)
POTASSIUM SERPL-SCNC: 4.9 MMOL/L (ref 3.5–5.1)
RBC # BLD AUTO: 2.76 M/UL (ref 4.1–5.7)
SERVICE CMNT-IMP: ABNORMAL
SERVICE CMNT-IMP: NORMAL
SODIUM SERPL-SCNC: 138 MMOL/L (ref 136–145)
WBC # BLD AUTO: 17 K/UL (ref 4.1–11.1)

## 2025-02-23 PROCEDURE — 6370000000 HC RX 637 (ALT 250 FOR IP): Performed by: PHYSICIAN ASSISTANT

## 2025-02-23 PROCEDURE — 36415 COLL VENOUS BLD VENIPUNCTURE: CPT

## 2025-02-23 PROCEDURE — 84100 ASSAY OF PHOSPHORUS: CPT

## 2025-02-23 PROCEDURE — 6360000002 HC RX W HCPCS: Performed by: THORACIC SURGERY (CARDIOTHORACIC VASCULAR SURGERY)

## 2025-02-23 PROCEDURE — 82962 GLUCOSE BLOOD TEST: CPT

## 2025-02-23 PROCEDURE — 6360000002 HC RX W HCPCS: Performed by: PHYSICIAN ASSISTANT

## 2025-02-23 PROCEDURE — 83735 ASSAY OF MAGNESIUM: CPT

## 2025-02-23 PROCEDURE — 2000000000 HC ICU R&B

## 2025-02-23 PROCEDURE — 2580000003 HC RX 258: Performed by: PHYSICIAN ASSISTANT

## 2025-02-23 PROCEDURE — 2100000001 HC CVICU R&B

## 2025-02-23 PROCEDURE — 80048 BASIC METABOLIC PNL TOTAL CA: CPT

## 2025-02-23 PROCEDURE — 2500000003 HC RX 250 WO HCPCS: Performed by: THORACIC SURGERY (CARDIOTHORACIC VASCULAR SURGERY)

## 2025-02-23 PROCEDURE — 2500000003 HC RX 250 WO HCPCS: Performed by: PHYSICIAN ASSISTANT

## 2025-02-23 PROCEDURE — 71045 X-RAY EXAM CHEST 1 VIEW: CPT

## 2025-02-23 PROCEDURE — 6370000000 HC RX 637 (ALT 250 FOR IP): Performed by: THORACIC SURGERY (CARDIOTHORACIC VASCULAR SURGERY)

## 2025-02-23 PROCEDURE — 85027 COMPLETE CBC AUTOMATED: CPT

## 2025-02-23 RX ORDER — HYDROCORTISONE SODIUM SUCCINATE 100 MG/2ML
25 INJECTION INTRAMUSCULAR; INTRAVENOUS EVERY 6 HOURS
Status: COMPLETED | OUTPATIENT
Start: 2025-02-23 | End: 2025-02-24

## 2025-02-23 RX ADMIN — GABAPENTIN 200 MG: 100 CAPSULE ORAL at 20:30

## 2025-02-23 RX ADMIN — HYDROCORTISONE SODIUM SUCCINATE 50 MG: 100 INJECTION, POWDER, FOR SOLUTION INTRAMUSCULAR; INTRAVENOUS at 08:23

## 2025-02-23 RX ADMIN — SENNOSIDES AND DOCUSATE SODIUM 1 TABLET: 50; 8.6 TABLET ORAL at 08:29

## 2025-02-23 RX ADMIN — CHLORHEXIDINE GLUCONATE 15 ML: 1.2 RINSE ORAL at 08:32

## 2025-02-23 RX ADMIN — MUPIROCIN: 20 OINTMENT TOPICAL at 08:33

## 2025-02-23 RX ADMIN — SENNOSIDES AND DOCUSATE SODIUM 1 TABLET: 50; 8.6 TABLET ORAL at 20:28

## 2025-02-23 RX ADMIN — HYDROCORTISONE SODIUM SUCCINATE 50 MG: 100 INJECTION, POWDER, FOR SOLUTION INTRAMUSCULAR; INTRAVENOUS at 02:43

## 2025-02-23 RX ADMIN — MAGNESIUM HYDROXIDE 30 ML: 400 SUSPENSION ORAL at 11:02

## 2025-02-23 RX ADMIN — WATER 2000 MG: 1 INJECTION INTRAMUSCULAR; INTRAVENOUS; SUBCUTANEOUS at 08:32

## 2025-02-23 RX ADMIN — ACETAMINOPHEN 1000 MG: 500 TABLET, FILM COATED ORAL at 08:29

## 2025-02-23 RX ADMIN — ACETAMINOPHEN 1000 MG: 500 TABLET, FILM COATED ORAL at 02:43

## 2025-02-23 RX ADMIN — FAMOTIDINE 20 MG: 20 TABLET, FILM COATED ORAL at 08:29

## 2025-02-23 RX ADMIN — ACETAMINOPHEN 1000 MG: 500 TABLET, FILM COATED ORAL at 14:24

## 2025-02-23 RX ADMIN — Medication 400 MG: at 08:29

## 2025-02-23 RX ADMIN — SODIUM CHLORIDE, PRESERVATIVE FREE 10 ML: 5 INJECTION INTRAVENOUS at 09:00

## 2025-02-23 RX ADMIN — ATORVASTATIN CALCIUM 40 MG: 40 TABLET, FILM COATED ORAL at 20:28

## 2025-02-23 RX ADMIN — MUPIROCIN: 20 OINTMENT TOPICAL at 20:30

## 2025-02-23 RX ADMIN — ACETAMINOPHEN 1000 MG: 500 TABLET, FILM COATED ORAL at 20:15

## 2025-02-23 RX ADMIN — CHLORHEXIDINE GLUCONATE 15 ML: 1.2 RINSE ORAL at 23:41

## 2025-02-23 RX ADMIN — VASOPRESSIN 0.03 UNITS/MIN: 20 INJECTION INTRAVENOUS at 19:44

## 2025-02-23 RX ADMIN — Medication 400 MG: at 20:28

## 2025-02-23 RX ADMIN — INSULIN LISPRO 2 UNITS: 100 INJECTION, SOLUTION INTRAVENOUS; SUBCUTANEOUS at 16:24

## 2025-02-23 RX ADMIN — SODIUM CHLORIDE, PRESERVATIVE FREE 10 ML: 5 INJECTION INTRAVENOUS at 20:24

## 2025-02-23 RX ADMIN — VASOPRESSIN 0.03 UNITS/MIN: 20 INJECTION INTRAVENOUS at 08:15

## 2025-02-23 RX ADMIN — ASPIRIN 81 MG: 81 TABLET, CHEWABLE ORAL at 08:27

## 2025-02-23 RX ADMIN — GABAPENTIN 200 MG: 100 CAPSULE ORAL at 08:29

## 2025-02-23 RX ADMIN — POLYETHYLENE GLYCOL 3350 17 G: 17 POWDER, FOR SOLUTION ORAL at 08:33

## 2025-02-23 RX ADMIN — HYDROCORTISONE SODIUM SUCCINATE 25 MG: 100 INJECTION, POWDER, FOR SOLUTION INTRAMUSCULAR; INTRAVENOUS at 14:24

## 2025-02-23 RX ADMIN — GABAPENTIN 200 MG: 100 CAPSULE ORAL at 14:24

## 2025-02-23 RX ADMIN — HYDROCORTISONE SODIUM SUCCINATE 25 MG: 100 INJECTION, POWDER, FOR SOLUTION INTRAMUSCULAR; INTRAVENOUS at 20:23

## 2025-02-23 ASSESSMENT — PAIN DESCRIPTION - LOCATION
LOCATION: INCISION
LOCATION: INCISION

## 2025-02-23 ASSESSMENT — PAIN SCALES - GENERAL
PAINLEVEL_OUTOF10: 0
PAINLEVEL_OUTOF10: 0

## 2025-02-23 NOTE — PROCEDURES
Pulmonary Function Test        PATIENT ID: Alexandr Lewis  MRN: [id number]   YOB: 1940    DATE OF ADMISSION: [id number]    PRIMARY CARE PROVIDER: Valentina Hanson APRN - NP       Spirometry:  FVC is low, FEV1 note done.    Bronchodilator response:  Bronchodilator test not performed.    Volumes:  Lung volumes not performed.    Diffusion:  Diffusing capacity is elevated.    Flow-Volume:  Unable to interpret flow volume loop due to technical difficulties.      Signed:   José Miguel Garcia MD  2/23/2025  6:49 PM

## 2025-02-24 ENCOUNTER — APPOINTMENT (OUTPATIENT)
Facility: HOSPITAL | Age: 85
DRG: 234 | End: 2025-02-24
Payer: MEDICARE

## 2025-02-24 PROBLEM — I24.9 ACUTE CORONARY SYNDROME (HCC): Status: ACTIVE | Noted: 2025-02-24

## 2025-02-24 LAB
ANION GAP SERPL CALC-SCNC: 2 MMOL/L (ref 2–12)
ANION GAP SERPL CALC-SCNC: 3 MMOL/L (ref 2–12)
BUN SERPL-MCNC: 49 MG/DL (ref 6–20)
BUN SERPL-MCNC: 52 MG/DL (ref 6–20)
BUN/CREAT SERPL: 23 (ref 12–20)
BUN/CREAT SERPL: 24 (ref 12–20)
CALCIUM SERPL-MCNC: 8.5 MG/DL (ref 8.5–10.1)
CALCIUM SERPL-MCNC: 8.6 MG/DL (ref 8.5–10.1)
CHLORIDE SERPL-SCNC: 109 MMOL/L (ref 97–108)
CHLORIDE SERPL-SCNC: 110 MMOL/L (ref 97–108)
CO2 SERPL-SCNC: 23 MMOL/L (ref 21–32)
CO2 SERPL-SCNC: 25 MMOL/L (ref 21–32)
CREAT SERPL-MCNC: 2.13 MG/DL (ref 0.7–1.3)
CREAT SERPL-MCNC: 2.15 MG/DL (ref 0.7–1.3)
ERYTHROCYTE [DISTWIDTH] IN BLOOD BY AUTOMATED COUNT: 15.7 % (ref 11.5–14.5)
GLUCOSE BLD STRIP.AUTO-MCNC: 104 MG/DL (ref 65–117)
GLUCOSE BLD STRIP.AUTO-MCNC: 119 MG/DL (ref 65–117)
GLUCOSE BLD STRIP.AUTO-MCNC: 163 MG/DL (ref 65–117)
GLUCOSE BLD STRIP.AUTO-MCNC: 87 MG/DL (ref 65–117)
GLUCOSE SERPL-MCNC: 124 MG/DL (ref 65–100)
GLUCOSE SERPL-MCNC: 77 MG/DL (ref 65–100)
HCT VFR BLD AUTO: 20.7 % (ref 36.6–50.3)
HCT VFR BLD AUTO: 24.6 % (ref 36.6–50.3)
HGB BLD-MCNC: 6.8 G/DL (ref 12.1–17)
HGB BLD-MCNC: 8.2 G/DL (ref 12.1–17)
HGB BLD-MCNC: 8.7 G/DL (ref 12.1–17)
HGB BLD-MCNC: 8.7 G/DL (ref 12.1–17)
HGB BLD-MCNC: 8.9 G/DL (ref 12.1–17)
HGB BLD-MCNC: 8.9 G/DL (ref 12.1–17)
HGB BLD-MCNC: 9 G/DL (ref 12.1–17)
HGB BLD-MCNC: 9.4 G/DL (ref 12.1–17)
HGB BLD-MCNC: 9.9 G/DL (ref 12.1–17)
HISTORY CHECK: NORMAL
MAGNESIUM SERPL-MCNC: 3 MG/DL (ref 1.6–2.4)
MCH RBC QN AUTO: 27.2 PG (ref 26–34)
MCHC RBC AUTO-ENTMCNC: 32.9 G/DL (ref 30–36.5)
MCV RBC AUTO: 82.8 FL (ref 80–99)
NRBC # BLD: 0 K/UL (ref 0–0.01)
NRBC BLD-RTO: 0 PER 100 WBC
PHOSPHATE SERPL-MCNC: 3.4 MG/DL (ref 2.6–4.7)
PLATELET # BLD AUTO: 107 K/UL (ref 150–400)
PMV BLD AUTO: 12.1 FL (ref 8.9–12.9)
POTASSIUM SERPL-SCNC: 5.2 MMOL/L (ref 3.5–5.1)
POTASSIUM SERPL-SCNC: 5.2 MMOL/L (ref 3.5–5.1)
RBC # BLD AUTO: 2.5 M/UL (ref 4.1–5.7)
SERVICE CMNT-IMP: ABNORMAL
SERVICE CMNT-IMP: ABNORMAL
SERVICE CMNT-IMP: NORMAL
SERVICE CMNT-IMP: NORMAL
SODIUM SERPL-SCNC: 136 MMOL/L (ref 136–145)
SODIUM SERPL-SCNC: 136 MMOL/L (ref 136–145)
WBC # BLD AUTO: 12 K/UL (ref 4.1–11.1)

## 2025-02-24 PROCEDURE — 6360000002 HC RX W HCPCS: Performed by: NURSE PRACTITIONER

## 2025-02-24 PROCEDURE — 86850 RBC ANTIBODY SCREEN: CPT

## 2025-02-24 PROCEDURE — 6360000002 HC RX W HCPCS: Performed by: THORACIC SURGERY (CARDIOTHORACIC VASCULAR SURGERY)

## 2025-02-24 PROCEDURE — 86901 BLOOD TYPING SEROLOGIC RH(D): CPT

## 2025-02-24 PROCEDURE — 6370000000 HC RX 637 (ALT 250 FOR IP): Performed by: PHYSICIAN ASSISTANT

## 2025-02-24 PROCEDURE — P9016 RBC LEUKOCYTES REDUCED: HCPCS

## 2025-02-24 PROCEDURE — 84100 ASSAY OF PHOSPHORUS: CPT

## 2025-02-24 PROCEDURE — 82962 GLUCOSE BLOOD TEST: CPT

## 2025-02-24 PROCEDURE — 97116 GAIT TRAINING THERAPY: CPT

## 2025-02-24 PROCEDURE — 6370000000 HC RX 637 (ALT 250 FOR IP): Performed by: THORACIC SURGERY (CARDIOTHORACIC VASCULAR SURGERY)

## 2025-02-24 PROCEDURE — 85014 HEMATOCRIT: CPT

## 2025-02-24 PROCEDURE — 71045 X-RAY EXAM CHEST 1 VIEW: CPT

## 2025-02-24 PROCEDURE — 86923 COMPATIBILITY TEST ELECTRIC: CPT

## 2025-02-24 PROCEDURE — 83735 ASSAY OF MAGNESIUM: CPT

## 2025-02-24 PROCEDURE — 6370000000 HC RX 637 (ALT 250 FOR IP): Performed by: NURSE PRACTITIONER

## 2025-02-24 PROCEDURE — 2700000000 HC OXYGEN THERAPY PER DAY

## 2025-02-24 PROCEDURE — 30233N1 TRANSFUSION OF NONAUTOLOGOUS RED BLOOD CELLS INTO PERIPHERAL VEIN, PERCUTANEOUS APPROACH: ICD-10-PCS | Performed by: NURSE PRACTITIONER

## 2025-02-24 PROCEDURE — 2500000003 HC RX 250 WO HCPCS: Performed by: PHYSICIAN ASSISTANT

## 2025-02-24 PROCEDURE — 2580000003 HC RX 258: Performed by: PHYSICIAN ASSISTANT

## 2025-02-24 PROCEDURE — 6360000002 HC RX W HCPCS: Performed by: PHYSICIAN ASSISTANT

## 2025-02-24 PROCEDURE — 85027 COMPLETE CBC AUTOMATED: CPT

## 2025-02-24 PROCEDURE — 80048 BASIC METABOLIC PNL TOTAL CA: CPT

## 2025-02-24 PROCEDURE — 36415 COLL VENOUS BLD VENIPUNCTURE: CPT

## 2025-02-24 PROCEDURE — 2000000000 HC ICU R&B

## 2025-02-24 PROCEDURE — 99221 1ST HOSP IP/OBS SF/LOW 40: CPT

## 2025-02-24 PROCEDURE — 97110 THERAPEUTIC EXERCISES: CPT | Performed by: OCCUPATIONAL THERAPIST

## 2025-02-24 PROCEDURE — 36430 TRANSFUSION BLD/BLD COMPNT: CPT

## 2025-02-24 PROCEDURE — 86900 BLOOD TYPING SEROLOGIC ABO: CPT

## 2025-02-24 PROCEDURE — 97110 THERAPEUTIC EXERCISES: CPT

## 2025-02-24 PROCEDURE — 97535 SELF CARE MNGMENT TRAINING: CPT | Performed by: OCCUPATIONAL THERAPIST

## 2025-02-24 RX ORDER — GABAPENTIN 100 MG/1
100 CAPSULE ORAL 3 TIMES DAILY
Status: DISCONTINUED | OUTPATIENT
Start: 2025-02-24 | End: 2025-02-27 | Stop reason: HOSPADM

## 2025-02-24 RX ORDER — HEPARIN SODIUM 5000 [USP'U]/ML
5000 INJECTION, SOLUTION INTRAVENOUS; SUBCUTANEOUS EVERY 8 HOURS SCHEDULED
Status: DISCONTINUED | OUTPATIENT
Start: 2025-02-24 | End: 2025-02-27 | Stop reason: HOSPADM

## 2025-02-24 RX ORDER — FERROUS SULFATE 325(65) MG
325 TABLET ORAL 2 TIMES DAILY WITH MEALS
Status: DISCONTINUED | OUTPATIENT
Start: 2025-02-24 | End: 2025-02-27 | Stop reason: HOSPADM

## 2025-02-24 RX ORDER — FUROSEMIDE 10 MG/ML
40 INJECTION INTRAMUSCULAR; INTRAVENOUS ONCE
Status: COMPLETED | OUTPATIENT
Start: 2025-02-24 | End: 2025-02-24

## 2025-02-24 RX ORDER — SODIUM CHLORIDE 9 MG/ML
INJECTION, SOLUTION INTRAVENOUS PRN
Status: DISCONTINUED | OUTPATIENT
Start: 2025-02-24 | End: 2025-02-27 | Stop reason: HOSPADM

## 2025-02-24 RX ORDER — ASCORBIC ACID 500 MG
500 TABLET ORAL DAILY
Status: DISCONTINUED | OUTPATIENT
Start: 2025-02-24 | End: 2025-02-27 | Stop reason: HOSPADM

## 2025-02-24 RX ADMIN — SODIUM CHLORIDE, PRESERVATIVE FREE 10 ML: 5 INJECTION INTRAVENOUS at 19:39

## 2025-02-24 RX ADMIN — FERROUS SULFATE TAB 325 MG (65 MG ELEMENTAL FE) 325 MG: 325 (65 FE) TAB at 11:04

## 2025-02-24 RX ADMIN — SENNOSIDES AND DOCUSATE SODIUM 1 TABLET: 50; 8.6 TABLET ORAL at 19:46

## 2025-02-24 RX ADMIN — HYDROCORTISONE SODIUM SUCCINATE 25 MG: 100 INJECTION, POWDER, FOR SOLUTION INTRAMUSCULAR; INTRAVENOUS at 07:54

## 2025-02-24 RX ADMIN — ACETAMINOPHEN 1000 MG: 500 TABLET, FILM COATED ORAL at 14:06

## 2025-02-24 RX ADMIN — ASPIRIN 81 MG: 81 TABLET, CHEWABLE ORAL at 08:03

## 2025-02-24 RX ADMIN — FAMOTIDINE 20 MG: 20 TABLET, FILM COATED ORAL at 08:02

## 2025-02-24 RX ADMIN — SODIUM CHLORIDE, PRESERVATIVE FREE 10 ML: 5 INJECTION INTRAVENOUS at 08:07

## 2025-02-24 RX ADMIN — OXYCODONE HYDROCHLORIDE AND ACETAMINOPHEN 500 MG: 500 TABLET ORAL at 11:04

## 2025-02-24 RX ADMIN — CHLORHEXIDINE GLUCONATE 15 ML: 1.2 RINSE ORAL at 08:07

## 2025-02-24 RX ADMIN — INSULIN LISPRO 2 UNITS: 100 INJECTION, SOLUTION INTRAVENOUS; SUBCUTANEOUS at 14:05

## 2025-02-24 RX ADMIN — GABAPENTIN 100 MG: 100 CAPSULE ORAL at 14:10

## 2025-02-24 RX ADMIN — POLYETHYLENE GLYCOL 3350 17 G: 17 POWDER, FOR SOLUTION ORAL at 08:04

## 2025-02-24 RX ADMIN — HYDROCORTISONE SODIUM SUCCINATE 25 MG: 100 INJECTION, POWDER, FOR SOLUTION INTRAMUSCULAR; INTRAVENOUS at 02:29

## 2025-02-24 RX ADMIN — CHLORHEXIDINE GLUCONATE 15 ML: 1.2 RINSE ORAL at 19:47

## 2025-02-24 RX ADMIN — GABAPENTIN 100 MG: 100 CAPSULE ORAL at 21:51

## 2025-02-24 RX ADMIN — MUPIROCIN: 20 OINTMENT TOPICAL at 19:46

## 2025-02-24 RX ADMIN — FERROUS SULFATE TAB 325 MG (65 MG ELEMENTAL FE) 325 MG: 325 (65 FE) TAB at 19:39

## 2025-02-24 RX ADMIN — HEPARIN SODIUM 5000 UNITS: 5000 INJECTION INTRAVENOUS; SUBCUTANEOUS at 14:10

## 2025-02-24 RX ADMIN — VASOPRESSIN 0.03 UNITS/MIN: 20 INJECTION INTRAVENOUS at 04:27

## 2025-02-24 RX ADMIN — ATORVASTATIN CALCIUM 40 MG: 40 TABLET, FILM COATED ORAL at 19:46

## 2025-02-24 RX ADMIN — ACETAMINOPHEN 1000 MG: 500 TABLET, FILM COATED ORAL at 08:02

## 2025-02-24 RX ADMIN — MUPIROCIN: 20 OINTMENT TOPICAL at 08:07

## 2025-02-24 RX ADMIN — FUROSEMIDE 40 MG: 10 INJECTION, SOLUTION INTRAMUSCULAR; INTRAVENOUS at 12:22

## 2025-02-24 RX ADMIN — GABAPENTIN 100 MG: 100 CAPSULE ORAL at 08:03

## 2025-02-24 RX ADMIN — SENNOSIDES AND DOCUSATE SODIUM 1 TABLET: 50; 8.6 TABLET ORAL at 08:02

## 2025-02-24 RX ADMIN — ACETAMINOPHEN 1000 MG: 500 TABLET, FILM COATED ORAL at 19:46

## 2025-02-24 RX ADMIN — HEPARIN SODIUM 5000 UNITS: 5000 INJECTION INTRAVENOUS; SUBCUTANEOUS at 21:51

## 2025-02-24 ASSESSMENT — PAIN DESCRIPTION - ORIENTATION
ORIENTATION: MID
ORIENTATION: LOWER

## 2025-02-24 ASSESSMENT — PAIN DESCRIPTION - LOCATION
LOCATION: CHEST
LOCATION: BACK
LOCATION: INCISION

## 2025-02-24 ASSESSMENT — PAIN SCALES - GENERAL: PAINLEVEL_OUTOF10: 2

## 2025-02-24 ASSESSMENT — PAIN DESCRIPTION - DESCRIPTORS: DESCRIPTORS: ACHING

## 2025-02-24 NOTE — CARE COORDINATION
Transition of Care Plan:    RUR: 21% \"high risk\"  Prior Level of Functioning: Independent with ADL's  Disposition: Home with family support and AT Home Care  STEPHANIE: 2/25  If SNF or IPR: Date FOC offered: N/A  Follow up appointments: PCP/Specialists as indicated  DME needed: None - pt has a cane and walker at home   Transportation at discharge: Family to transport   IM/IMM Medicare/ letter given: 2nd IM needed prior to d/c   Is patient a [facility] and connected with [facility]? No  Caregiver Contact: Alexandra Haney (niece), [phone]  Discharge Caregiver contacted prior to discharge? To be contacted  Care Conference needed? Not at this time   Barriers to discharge: CTS     1102 AM: Chart reviewed. CM following for d/c planning. Pt to return home with family assistance and  services, referrals pending at this time.     MEGHNA Ingram  Care Management  [facility]   [phone]

## 2025-02-24 NOTE — CARDIO/PULMONARY
Chart reviewed: Patient is 84 y.o. male admitted with Internal hernia [K45.8]  Acute coronary syndrome (HCC) [I24.9]  NSTEMI (non-ST elevated myocardial infarction) (HCC) [I21.4]    CABG 2/19/25    Patient not appropriate to see today per CTS coordinator.    CP Rehab following.

## 2025-02-24 NOTE — DIABETES MGMT
[facility]  DIABETES MANAGEMENT CONSULT    Consulted by Provider for advanced nursing evaluation and care for inpatient blood glucose management.    Evaluation and Action Plan   Alexandr Lewis is an 84 year old male patient with no known hx of DM. The patient was admitted for CABG surgery. On 2/14/25 he was brought to ER after experiencing acute chest pain. NSTEMI. Elevated troponin.CABG surgery completed on 2/21/25. He was transitioned off the CTS protocol insulin infusion with ease on 2/23/25 in the afternoon. He has required very little corrective insulin. Diabetes management will monitor BG's another 24 hours.     Blood glucose pattern        Management Rationale Action Plan   Medication   Corrective insulin Using medium dose sensitivity based on weight     Additional orders  Carb consistent diet (60g CHO/meal)       Diabetes Discharge Plan   Medication  TBD   Referral  []        Outpatient diabetes education   Additional orders            Initial Presentation   Alexandr Lewis is a 84 y.o. male who presented to the ED on 2/20/25  LAB: Glucose 90. Creatine 1.75. GFR 38.   rocedures    Left heart cath / coronary angiography   Ultrasound guided vascular access         Indications    NSTEMI (non-ST elevated myocardial infarction) ([facility]) [I21.4 (ICD-10-CM)]     Link to Procedure Log    Procedure Log     PACS Images     Show images for Cardiac procedure  Cath/EP Waveforms    Cath/EP waveform scan report            Conclusion    Cardiac Cathetherization Note      PreOp Diagnosis:               []       Chest Pain              []       STEMI              []       Unstable Angina              [x]       NSTEMI              []       Cardiomyopathy/Heart Failure              []       Abnormal Stress Test              []       Valve Disease              []       Pre-Operative Evaluation              []       Other:       Findings/PostOp Diagnosis:   1. Heavily calcified 3v CAD  2. Normal LVEDP

## 2025-02-25 ENCOUNTER — APPOINTMENT (OUTPATIENT)
Facility: HOSPITAL | Age: 85
DRG: 234 | End: 2025-02-25
Payer: MEDICARE

## 2025-02-25 DIAGNOSIS — Z95.1 S/P CABG (CORONARY ARTERY BYPASS GRAFT): Primary | ICD-10-CM

## 2025-02-25 PROBLEM — R73.9 STRESS HYPERGLYCEMIA: Status: ACTIVE | Noted: 2025-02-25

## 2025-02-25 LAB
ABO + RH BLD: NORMAL
ANION GAP SERPL CALC-SCNC: 5 MMOL/L (ref 2–12)
BLD PROD TYP BPU: NORMAL
BLOOD BANK BLOOD PRODUCT EXPIRATION DATE: NORMAL
BLOOD BANK DISPENSE STATUS: NORMAL
BLOOD BANK ISBT PRODUCT BLOOD TYPE: 7300
BLOOD BANK PRODUCT CODE: NORMAL
BLOOD BANK UNIT TYPE AND RH: NORMAL
BLOOD GROUP ANTIBODIES SERPL: NORMAL
BPU ID: NORMAL
BUN SERPL-MCNC: 52 MG/DL (ref 6–20)
BUN/CREAT SERPL: 24 (ref 12–20)
CALCIUM SERPL-MCNC: 8.6 MG/DL (ref 8.5–10.1)
CHLORIDE SERPL-SCNC: 109 MMOL/L (ref 97–108)
CO2 SERPL-SCNC: 24 MMOL/L (ref 21–32)
CREAT SERPL-MCNC: 2.17 MG/DL (ref 0.7–1.3)
CROSSMATCH RESULT: NORMAL
ERYTHROCYTE [DISTWIDTH] IN BLOOD BY AUTOMATED COUNT: 16.8 % (ref 11.5–14.5)
GLUCOSE BLD STRIP.AUTO-MCNC: 104 MG/DL (ref 65–117)
GLUCOSE BLD STRIP.AUTO-MCNC: 113 MG/DL (ref 65–117)
GLUCOSE BLD STRIP.AUTO-MCNC: 82 MG/DL (ref 65–117)
GLUCOSE BLD STRIP.AUTO-MCNC: 93 MG/DL (ref 65–117)
GLUCOSE SERPL-MCNC: 89 MG/DL (ref 65–100)
HCT VFR BLD AUTO: 27.6 % (ref 36.6–50.3)
HGB BLD-MCNC: 9.1 G/DL (ref 12.1–17)
MAGNESIUM SERPL-MCNC: 3 MG/DL (ref 1.6–2.4)
MCH RBC QN AUTO: 27.6 PG (ref 26–34)
MCHC RBC AUTO-ENTMCNC: 33 G/DL (ref 30–36.5)
MCV RBC AUTO: 83.6 FL (ref 80–99)
NRBC # BLD: 0 K/UL (ref 0–0.01)
NRBC BLD-RTO: 0 PER 100 WBC
PHOSPHATE SERPL-MCNC: 2.3 MG/DL (ref 2.6–4.7)
PLATELET # BLD AUTO: 133 K/UL (ref 150–400)
PMV BLD AUTO: 11.7 FL (ref 8.9–12.9)
POTASSIUM SERPL-SCNC: 4.5 MMOL/L (ref 3.5–5.1)
RBC # BLD AUTO: 3.3 M/UL (ref 4.1–5.7)
SERVICE CMNT-IMP: NORMAL
SODIUM SERPL-SCNC: 138 MMOL/L (ref 136–145)
SPECIMEN EXP DATE BLD: NORMAL
UNIT DIVISION: 0
UNIT ISSUE DATE/TIME: NORMAL
WBC # BLD AUTO: 9.5 K/UL (ref 4.1–11.1)

## 2025-02-25 PROCEDURE — 6370000000 HC RX 637 (ALT 250 FOR IP): Performed by: THORACIC SURGERY (CARDIOTHORACIC VASCULAR SURGERY)

## 2025-02-25 PROCEDURE — 97116 GAIT TRAINING THERAPY: CPT

## 2025-02-25 PROCEDURE — 97535 SELF CARE MNGMENT TRAINING: CPT

## 2025-02-25 PROCEDURE — 6360000002 HC RX W HCPCS: Performed by: THORACIC SURGERY (CARDIOTHORACIC VASCULAR SURGERY)

## 2025-02-25 PROCEDURE — 82962 GLUCOSE BLOOD TEST: CPT

## 2025-02-25 PROCEDURE — 84100 ASSAY OF PHOSPHORUS: CPT

## 2025-02-25 PROCEDURE — 85027 COMPLETE CBC AUTOMATED: CPT

## 2025-02-25 PROCEDURE — 97110 THERAPEUTIC EXERCISES: CPT

## 2025-02-25 PROCEDURE — 6370000000 HC RX 637 (ALT 250 FOR IP): Performed by: PHYSICIAN ASSISTANT

## 2025-02-25 PROCEDURE — 2500000003 HC RX 250 WO HCPCS: Performed by: PHYSICIAN ASSISTANT

## 2025-02-25 PROCEDURE — 80048 BASIC METABOLIC PNL TOTAL CA: CPT

## 2025-02-25 PROCEDURE — 6370000000 HC RX 637 (ALT 250 FOR IP): Performed by: NURSE PRACTITIONER

## 2025-02-25 PROCEDURE — 2060000000 HC ICU INTERMEDIATE R&B

## 2025-02-25 PROCEDURE — 83735 ASSAY OF MAGNESIUM: CPT

## 2025-02-25 PROCEDURE — 71045 X-RAY EXAM CHEST 1 VIEW: CPT

## 2025-02-25 PROCEDURE — 99231 SBSQ HOSP IP/OBS SF/LOW 25: CPT | Performed by: INTERNAL MEDICINE

## 2025-02-25 PROCEDURE — 36415 COLL VENOUS BLD VENIPUNCTURE: CPT

## 2025-02-25 RX ORDER — CLOPIDOGREL BISULFATE 75 MG/1
75 TABLET ORAL DAILY
Status: DISCONTINUED | OUTPATIENT
Start: 2025-02-25 | End: 2025-02-27 | Stop reason: HOSPADM

## 2025-02-25 RX ADMIN — GABAPENTIN 100 MG: 100 CAPSULE ORAL at 14:21

## 2025-02-25 RX ADMIN — GABAPENTIN 100 MG: 100 CAPSULE ORAL at 21:08

## 2025-02-25 RX ADMIN — HEPARIN SODIUM 5000 UNITS: 5000 INJECTION INTRAVENOUS; SUBCUTANEOUS at 05:39

## 2025-02-25 RX ADMIN — HEPARIN SODIUM 5000 UNITS: 5000 INJECTION INTRAVENOUS; SUBCUTANEOUS at 14:23

## 2025-02-25 RX ADMIN — POLYETHYLENE GLYCOL 3350 17 G: 17 POWDER, FOR SOLUTION ORAL at 10:03

## 2025-02-25 RX ADMIN — OXYCODONE HYDROCHLORIDE AND ACETAMINOPHEN 500 MG: 500 TABLET ORAL at 10:04

## 2025-02-25 RX ADMIN — SODIUM CHLORIDE, PRESERVATIVE FREE 10 ML: 5 INJECTION INTRAVENOUS at 21:13

## 2025-02-25 RX ADMIN — SODIUM CHLORIDE, PRESERVATIVE FREE 10 ML: 5 INJECTION INTRAVENOUS at 10:04

## 2025-02-25 RX ADMIN — ACETAMINOPHEN 1000 MG: 500 TABLET, FILM COATED ORAL at 14:21

## 2025-02-25 RX ADMIN — ACETAMINOPHEN 1000 MG: 500 TABLET, FILM COATED ORAL at 21:07

## 2025-02-25 RX ADMIN — SENNOSIDES AND DOCUSATE SODIUM 1 TABLET: 50; 8.6 TABLET ORAL at 21:08

## 2025-02-25 RX ADMIN — MUPIROCIN: 20 OINTMENT TOPICAL at 21:09

## 2025-02-25 RX ADMIN — Medication 250 MG: at 12:30

## 2025-02-25 RX ADMIN — FERROUS SULFATE TAB 325 MG (65 MG ELEMENTAL FE) 325 MG: 325 (65 FE) TAB at 10:04

## 2025-02-25 RX ADMIN — Medication 250 MG: at 21:08

## 2025-02-25 RX ADMIN — ASPIRIN 81 MG: 81 TABLET, CHEWABLE ORAL at 10:04

## 2025-02-25 RX ADMIN — FAMOTIDINE 20 MG: 20 TABLET, FILM COATED ORAL at 10:03

## 2025-02-25 RX ADMIN — CHLORHEXIDINE GLUCONATE 15 ML: 1.2 RINSE ORAL at 21:07

## 2025-02-25 RX ADMIN — HEPARIN SODIUM 5000 UNITS: 5000 INJECTION INTRAVENOUS; SUBCUTANEOUS at 21:07

## 2025-02-25 RX ADMIN — MUPIROCIN: 20 OINTMENT TOPICAL at 10:04

## 2025-02-25 RX ADMIN — ACETAMINOPHEN 1000 MG: 500 TABLET, FILM COATED ORAL at 05:39

## 2025-02-25 RX ADMIN — GABAPENTIN 100 MG: 100 CAPSULE ORAL at 10:04

## 2025-02-25 RX ADMIN — ATORVASTATIN CALCIUM 40 MG: 40 TABLET, FILM COATED ORAL at 21:08

## 2025-02-25 RX ADMIN — SENNOSIDES AND DOCUSATE SODIUM 1 TABLET: 50; 8.6 TABLET ORAL at 10:03

## 2025-02-25 RX ADMIN — CLOPIDOGREL BISULFATE 75 MG: 75 TABLET ORAL at 10:03

## 2025-02-25 RX ADMIN — CHLORHEXIDINE GLUCONATE 15 ML: 1.2 RINSE ORAL at 10:03

## 2025-02-25 RX ADMIN — Medication 250 MG: at 17:54

## 2025-02-25 RX ADMIN — FERROUS SULFATE TAB 325 MG (65 MG ELEMENTAL FE) 325 MG: 325 (65 FE) TAB at 17:54

## 2025-02-25 ASSESSMENT — PAIN SCALES - GENERAL: PAINLEVEL_OUTOF10: 0

## 2025-02-25 NOTE — CARDIO/PULMONARY
Chart reviewed: Patient is 84 y.o. male admitted with Internal hernia [K45.8]  Acute coronary syndrome (HCC) [I24.9]  NSTEMI (non-ST elevated myocardial infarction) (HCC) [I21.4]    Education: CABG education folder at bedside.  Met with Alexandr Lewis to review cardiac surgery post discharge instructions and to discuss participation in the Cardiac Rehab Program.     Pt is [facility] and no family present at bedside. Unable to educate and schedule pt for Intake at this time. Per pt, he may have trouble with transportation but will follow up in AM before pt DC and see if family is present.     Vamshi Camacho RN   assisted to bedpan

## 2025-02-25 NOTE — DIABETES MGMT
Alexandr Lewis is an 84 year old male patient with no known hx of DM. The patient was admitted for CABG surgery. On 2/14/25 he was brought to ER after experiencing acute chest pain.  Admission labs notable for a glucose of 104, creatinine 1.87, troponin 337, A1c 5.6%. CABG surgery completed on 2/21/25. He was transitioned off the CTS protocol insulin infusion with ease on 2/23/25 in the afternoon. He has required very little corrective insulin.    Examination  Blood pressure 167/72  Pulse 85  Afebrile  98% on room air  Weight 78 kg    Recent laboratory data  Glucose 104 (range )  Creatinine 2.17  Bicarb 24    Impression  1.  Coronary artery disease status post coronary artery bypass surgery  2.  Stress hyperglycemia, resolved    Plan:  1.  Clearly the patient requires no antihyperglycemic's at this time and has an admission A1c of 5.6%  2.  We will sign off. Please do not hesitate to reconsult of needed.  3. Rest per team    Before making these care recommendations, I personally reviewed the hospitalization record, including notes, laboratory & diagnostic data and current medications, and examined the patient at the bedside. Total time   25  minutes,.     Please note that this dictation was completed with SpotFodo, the computer voice recognition software.  Quite often unanticipated grammatical, syntax, homophones, and other interpretive errors are inadvertently transcribed by the computer software.  Please disregard these errors.  Please excuse any errors that have escaped final proofreading.

## 2025-02-26 ENCOUNTER — APPOINTMENT (OUTPATIENT)
Facility: HOSPITAL | Age: 85
DRG: 234 | End: 2025-02-26
Payer: MEDICARE

## 2025-02-26 LAB
ANION GAP SERPL CALC-SCNC: 4 MMOL/L (ref 2–12)
BUN SERPL-MCNC: 53 MG/DL (ref 6–20)
BUN/CREAT SERPL: 29 (ref 12–20)
CALCIUM SERPL-MCNC: 8.5 MG/DL (ref 8.5–10.1)
CHLORIDE SERPL-SCNC: 114 MMOL/L (ref 97–108)
CO2 SERPL-SCNC: 24 MMOL/L (ref 21–32)
CREAT SERPL-MCNC: 1.83 MG/DL (ref 0.7–1.3)
ERYTHROCYTE [DISTWIDTH] IN BLOOD BY AUTOMATED COUNT: 17 % (ref 11.5–14.5)
GLUCOSE SERPL-MCNC: 77 MG/DL (ref 65–100)
HCT VFR BLD AUTO: 29.3 % (ref 36.6–50.3)
HGB BLD-MCNC: 9.3 G/DL (ref 12.1–17)
MAGNESIUM SERPL-MCNC: 2.6 MG/DL (ref 1.6–2.4)
MCH RBC QN AUTO: 27.7 PG (ref 26–34)
MCHC RBC AUTO-ENTMCNC: 31.7 G/DL (ref 30–36.5)
MCV RBC AUTO: 87.2 FL (ref 80–99)
NRBC # BLD: 0 K/UL (ref 0–0.01)
NRBC BLD-RTO: 0 PER 100 WBC
PHOSPHATE SERPL-MCNC: 2 MG/DL (ref 2.6–4.7)
PLATELET # BLD AUTO: 158 K/UL (ref 150–400)
PMV BLD AUTO: 11.3 FL (ref 8.9–12.9)
POTASSIUM SERPL-SCNC: 4.9 MMOL/L (ref 3.5–5.1)
RBC # BLD AUTO: 3.36 M/UL (ref 4.1–5.7)
SODIUM SERPL-SCNC: 142 MMOL/L (ref 136–145)
WBC # BLD AUTO: 8.4 K/UL (ref 4.1–11.1)

## 2025-02-26 PROCEDURE — 6370000000 HC RX 637 (ALT 250 FOR IP): Performed by: NURSE PRACTITIONER

## 2025-02-26 PROCEDURE — 71045 X-RAY EXAM CHEST 1 VIEW: CPT

## 2025-02-26 PROCEDURE — 36415 COLL VENOUS BLD VENIPUNCTURE: CPT

## 2025-02-26 PROCEDURE — 6360000002 HC RX W HCPCS: Performed by: THORACIC SURGERY (CARDIOTHORACIC VASCULAR SURGERY)

## 2025-02-26 PROCEDURE — 83735 ASSAY OF MAGNESIUM: CPT

## 2025-02-26 PROCEDURE — 85027 COMPLETE CBC AUTOMATED: CPT

## 2025-02-26 PROCEDURE — 2060000000 HC ICU INTERMEDIATE R&B

## 2025-02-26 PROCEDURE — 6370000000 HC RX 637 (ALT 250 FOR IP): Performed by: PHYSICIAN ASSISTANT

## 2025-02-26 PROCEDURE — 2500000003 HC RX 250 WO HCPCS: Performed by: PHYSICIAN ASSISTANT

## 2025-02-26 PROCEDURE — 97116 GAIT TRAINING THERAPY: CPT

## 2025-02-26 PROCEDURE — 6370000000 HC RX 637 (ALT 250 FOR IP): Performed by: THORACIC SURGERY (CARDIOTHORACIC VASCULAR SURGERY)

## 2025-02-26 PROCEDURE — 97530 THERAPEUTIC ACTIVITIES: CPT

## 2025-02-26 PROCEDURE — 97110 THERAPEUTIC EXERCISES: CPT

## 2025-02-26 PROCEDURE — 80048 BASIC METABOLIC PNL TOTAL CA: CPT

## 2025-02-26 PROCEDURE — 97535 SELF CARE MNGMENT TRAINING: CPT

## 2025-02-26 PROCEDURE — 84100 ASSAY OF PHOSPHORUS: CPT

## 2025-02-26 RX ORDER — AMIODARONE HYDROCHLORIDE 200 MG/1
400 TABLET ORAL 2 TIMES DAILY
Status: DISCONTINUED | OUTPATIENT
Start: 2025-02-26 | End: 2025-02-27 | Stop reason: HOSPADM

## 2025-02-26 RX ORDER — METOPROLOL TARTRATE 25 MG/1
12.5 TABLET, FILM COATED ORAL 2 TIMES DAILY
Status: DISCONTINUED | OUTPATIENT
Start: 2025-02-26 | End: 2025-02-27 | Stop reason: HOSPADM

## 2025-02-26 RX ORDER — FUROSEMIDE 10 MG/ML
20 INJECTION INTRAMUSCULAR; INTRAVENOUS 2 TIMES DAILY
Status: COMPLETED | OUTPATIENT
Start: 2025-02-26 | End: 2025-02-26

## 2025-02-26 RX ADMIN — CHLORHEXIDINE GLUCONATE 15 ML: 1.2 RINSE ORAL at 09:13

## 2025-02-26 RX ADMIN — GABAPENTIN 100 MG: 100 CAPSULE ORAL at 09:14

## 2025-02-26 RX ADMIN — FERROUS SULFATE TAB 325 MG (65 MG ELEMENTAL FE) 325 MG: 325 (65 FE) TAB at 16:06

## 2025-02-26 RX ADMIN — OXYCODONE HYDROCHLORIDE AND ACETAMINOPHEN 500 MG: 500 TABLET ORAL at 09:14

## 2025-02-26 RX ADMIN — ACETAMINOPHEN 1000 MG: 500 TABLET, FILM COATED ORAL at 16:06

## 2025-02-26 RX ADMIN — METOPROLOL TARTRATE 12.5 MG: 25 TABLET, FILM COATED ORAL at 09:15

## 2025-02-26 RX ADMIN — ACETAMINOPHEN 1000 MG: 500 TABLET, FILM COATED ORAL at 21:37

## 2025-02-26 RX ADMIN — ACETAMINOPHEN 1000 MG: 500 TABLET, FILM COATED ORAL at 09:14

## 2025-02-26 RX ADMIN — FERROUS SULFATE TAB 325 MG (65 MG ELEMENTAL FE) 325 MG: 325 (65 FE) TAB at 09:14

## 2025-02-26 RX ADMIN — CHLORHEXIDINE GLUCONATE 15 ML: 1.2 RINSE ORAL at 21:37

## 2025-02-26 RX ADMIN — SODIUM CHLORIDE, PRESERVATIVE FREE 10 ML: 5 INJECTION INTRAVENOUS at 09:14

## 2025-02-26 RX ADMIN — HEPARIN SODIUM 5000 UNITS: 5000 INJECTION INTRAVENOUS; SUBCUTANEOUS at 06:39

## 2025-02-26 RX ADMIN — CLOPIDOGREL BISULFATE 75 MG: 75 TABLET ORAL at 09:14

## 2025-02-26 RX ADMIN — SENNOSIDES AND DOCUSATE SODIUM 1 TABLET: 50; 8.6 TABLET ORAL at 21:37

## 2025-02-26 RX ADMIN — AMIODARONE HYDROCHLORIDE 400 MG: 200 TABLET ORAL at 21:37

## 2025-02-26 RX ADMIN — ATORVASTATIN CALCIUM 40 MG: 40 TABLET, FILM COATED ORAL at 21:37

## 2025-02-26 RX ADMIN — METOPROLOL TARTRATE 12.5 MG: 25 TABLET, FILM COATED ORAL at 21:37

## 2025-02-26 RX ADMIN — HEPARIN SODIUM 5000 UNITS: 5000 INJECTION INTRAVENOUS; SUBCUTANEOUS at 21:37

## 2025-02-26 RX ADMIN — AMIODARONE HYDROCHLORIDE 400 MG: 200 TABLET ORAL at 12:48

## 2025-02-26 RX ADMIN — FUROSEMIDE 20 MG: 10 INJECTION, SOLUTION INTRAMUSCULAR; INTRAVENOUS at 16:06

## 2025-02-26 RX ADMIN — GABAPENTIN 100 MG: 100 CAPSULE ORAL at 16:05

## 2025-02-26 RX ADMIN — FAMOTIDINE 20 MG: 20 TABLET, FILM COATED ORAL at 09:14

## 2025-02-26 RX ADMIN — SODIUM CHLORIDE, PRESERVATIVE FREE 10 ML: 5 INJECTION INTRAVENOUS at 21:37

## 2025-02-26 RX ADMIN — SENNOSIDES AND DOCUSATE SODIUM 1 TABLET: 50; 8.6 TABLET ORAL at 09:14

## 2025-02-26 RX ADMIN — FUROSEMIDE 20 MG: 10 INJECTION, SOLUTION INTRAMUSCULAR; INTRAVENOUS at 09:14

## 2025-02-26 RX ADMIN — GABAPENTIN 100 MG: 100 CAPSULE ORAL at 21:37

## 2025-02-26 RX ADMIN — POLYETHYLENE GLYCOL 3350 17 G: 17 POWDER, FOR SOLUTION ORAL at 09:14

## 2025-02-26 RX ADMIN — ASPIRIN 81 MG: 81 TABLET, CHEWABLE ORAL at 09:14

## 2025-02-26 RX ADMIN — HEPARIN SODIUM 5000 UNITS: 5000 INJECTION INTRAVENOUS; SUBCUTANEOUS at 16:06

## 2025-02-26 ASSESSMENT — PAIN SCALES - GENERAL
PAINLEVEL_OUTOF10: 0

## 2025-02-26 NOTE — CARDIO/PULMONARY
Chart reviewed: Patient is 84 y.o. male admitted with Internal hernia [K45.8]  Acute coronary syndrome (HCC) [I24.9]  NSTEMI (non-ST elevated myocardial infarction) (HCC) [I21.4]    Education: CABG education folder at bedside.  Met with Alexandr Lewis to review cardiac surgery post discharge instructions and to discuss participation in the Cardiac Rehab Program.     Educated using teach back method. Reviewed the use of bear for sternal support, daily weight and temperature monitoring, showering restrictions, signs and symptoms of infection at surgery sites, daily walking and arm exercises, and use of incentive spirometer.Reviewed risk factors for CAD to include the following: family history, elevated BMI, hyperlipidemia, hypertension, diabetes, stress, and smoking.     Discussed Cardiac Rehab Program format, benefits, and encouraged participation. Initial Cardiac Rehab Program intake appointment scheduled for 3/17/25 and appointment information is on the AVS. General questions answered. Alexandr Lewis verbalized understanding.       Patient has several family members that assist patient and he expressed interest in program.

## 2025-02-26 NOTE — DISCHARGE INSTRUCTIONS
[facility]     [address] [facility]                        [address] [place] [address] [place]  [address]                                                           [address]  [place] [address]                                       [place] [address]  Office- [phone]  Fax- [phone]        Office- [phone]  Fax- [phone]  _________________________________________________________________  Dr. Jimmy Leonard, EZRA Christina, ANGELITA Roberts, ANGELITA Chau Dr., EZRA Benton, ANGELITA Mendez, EZRA Montaño, ANGELITA Nieves, EZRA Hercules NP                                                                                                                                            Name:Alexandr Lewis     Surgery & Date: 2/21/25    Discharge Date: 2/27/25     MEDICATIONS:  Please refer to your After Visit Summary for your medication list. If you do not have a prescription for a new medication, you may purchase the medication over the counter.   DO NOT TAKE ANY MEDICATIONS THAT ARE NOT ON THIS LIST    INSTRUCTIONS:  NO SMOKING OR TOBACCO PRODUCTS  Follow all the instructions in your discharge book  Shower daily. Wash all incisions twice daily. Once in the shower with Dial soap and water, and once over the sink with Dynahex 4 solution and rinse. Do this for 14 days. No lotions, ointments or powder.  Call the office immediately for any redness, swelling, or drainage from your incision.   Take your temperature daily and call for a temperature of 101 degrees or higher or for any symptoms that make you think you have and infection.  Weigh yourself each morning.  Call if you gain more than 5 pounds in 48 hours.  Use the incentive

## 2025-02-27 ENCOUNTER — APPOINTMENT (OUTPATIENT)
Facility: HOSPITAL | Age: 85
DRG: 234 | End: 2025-02-27
Payer: MEDICARE

## 2025-02-27 VITALS
BODY MASS INDEX: 28.32 KG/M2 | WEIGHT: 169.97 LBS | SYSTOLIC BLOOD PRESSURE: 109 MMHG | RESPIRATION RATE: 18 BRPM | TEMPERATURE: 97.6 F | OXYGEN SATURATION: 95 % | HEIGHT: 65 IN | HEART RATE: 62 BPM | DIASTOLIC BLOOD PRESSURE: 62 MMHG

## 2025-02-27 PROBLEM — R73.9 STRESS HYPERGLYCEMIA: Status: RESOLVED | Noted: 2025-02-25 | Resolved: 2025-02-27

## 2025-02-27 PROBLEM — Z01.810 PREOP CARDIOVASCULAR EXAM: Status: RESOLVED | Noted: 2025-02-17 | Resolved: 2025-02-27

## 2025-02-27 LAB
ANION GAP SERPL CALC-SCNC: 6 MMOL/L (ref 2–12)
BUN SERPL-MCNC: 57 MG/DL (ref 6–20)
BUN/CREAT SERPL: 29 (ref 12–20)
CALCIUM SERPL-MCNC: 8.8 MG/DL (ref 8.5–10.1)
CHLORIDE SERPL-SCNC: 106 MMOL/L (ref 97–108)
CO2 SERPL-SCNC: 26 MMOL/L (ref 21–32)
CREAT SERPL-MCNC: 1.98 MG/DL (ref 0.7–1.3)
ERYTHROCYTE [DISTWIDTH] IN BLOOD BY AUTOMATED COUNT: 17.2 % (ref 11.5–14.5)
GLUCOSE SERPL-MCNC: 82 MG/DL (ref 65–100)
HCT VFR BLD AUTO: 29.9 % (ref 36.6–50.3)
HGB BLD-MCNC: 9.5 G/DL (ref 12.1–17)
MAGNESIUM SERPL-MCNC: 2.5 MG/DL (ref 1.6–2.4)
MCH RBC QN AUTO: 27.5 PG (ref 26–34)
MCHC RBC AUTO-ENTMCNC: 31.8 G/DL (ref 30–36.5)
MCV RBC AUTO: 86.4 FL (ref 80–99)
NRBC # BLD: 0 K/UL (ref 0–0.01)
NRBC BLD-RTO: 0 PER 100 WBC
PHOSPHATE SERPL-MCNC: 2.8 MG/DL (ref 2.6–4.7)
PLATELET # BLD AUTO: 182 K/UL (ref 150–400)
PMV BLD AUTO: 10.9 FL (ref 8.9–12.9)
POTASSIUM SERPL-SCNC: 5.2 MMOL/L (ref 3.5–5.1)
RBC # BLD AUTO: 3.46 M/UL (ref 4.1–5.7)
SODIUM SERPL-SCNC: 138 MMOL/L (ref 136–145)
WBC # BLD AUTO: 10.3 K/UL (ref 4.1–11.1)

## 2025-02-27 PROCEDURE — 36415 COLL VENOUS BLD VENIPUNCTURE: CPT

## 2025-02-27 PROCEDURE — 6370000000 HC RX 637 (ALT 250 FOR IP): Performed by: THORACIC SURGERY (CARDIOTHORACIC VASCULAR SURGERY)

## 2025-02-27 PROCEDURE — 83735 ASSAY OF MAGNESIUM: CPT

## 2025-02-27 PROCEDURE — 84100 ASSAY OF PHOSPHORUS: CPT

## 2025-02-27 PROCEDURE — 2500000003 HC RX 250 WO HCPCS: Performed by: PHYSICIAN ASSISTANT

## 2025-02-27 PROCEDURE — 85027 COMPLETE CBC AUTOMATED: CPT

## 2025-02-27 PROCEDURE — 6370000000 HC RX 637 (ALT 250 FOR IP): Performed by: PHYSICIAN ASSISTANT

## 2025-02-27 PROCEDURE — 71045 X-RAY EXAM CHEST 1 VIEW: CPT

## 2025-02-27 PROCEDURE — 97535 SELF CARE MNGMENT TRAINING: CPT

## 2025-02-27 PROCEDURE — 80048 BASIC METABOLIC PNL TOTAL CA: CPT

## 2025-02-27 PROCEDURE — 6370000000 HC RX 637 (ALT 250 FOR IP): Performed by: NURSE PRACTITIONER

## 2025-02-27 PROCEDURE — 6360000002 HC RX W HCPCS: Performed by: THORACIC SURGERY (CARDIOTHORACIC VASCULAR SURGERY)

## 2025-02-27 RX ORDER — FUROSEMIDE 20 MG/1
20 TABLET ORAL DAILY
Status: DISCONTINUED | OUTPATIENT
Start: 2025-02-27 | End: 2025-02-27 | Stop reason: HOSPADM

## 2025-02-27 RX ORDER — LIDOCAINE 4 G/G
2 PATCH TOPICAL DAILY
Qty: 10 EACH | Refills: 0 | Status: SHIPPED | OUTPATIENT
Start: 2025-02-28 | End: 2025-03-05

## 2025-02-27 RX ORDER — SENNA AND DOCUSATE SODIUM 50; 8.6 MG/1; MG/1
1 TABLET, FILM COATED ORAL DAILY PRN
Qty: 5 TABLET | Refills: 0 | Status: SHIPPED | OUTPATIENT
Start: 2025-02-27 | End: 2025-03-04

## 2025-02-27 RX ORDER — METOPROLOL TARTRATE 25 MG/1
12.5 TABLET, FILM COATED ORAL 2 TIMES DAILY
Qty: 60 TABLET | Refills: 3 | Status: SHIPPED | OUTPATIENT
Start: 2025-02-27

## 2025-02-27 RX ORDER — FUROSEMIDE 20 MG/1
20 TABLET ORAL DAILY
Qty: 7 TABLET | Refills: 0 | Status: SHIPPED | OUTPATIENT
Start: 2025-02-27 | End: 2025-03-06

## 2025-02-27 RX ORDER — POLYETHYLENE GLYCOL 3350 17 G/17G
17 POWDER, FOR SOLUTION ORAL DAILY
Qty: 7 PACKET | Refills: 0 | Status: SHIPPED | OUTPATIENT
Start: 2025-02-28 | End: 2025-03-07

## 2025-02-27 RX ORDER — OXYCODONE HYDROCHLORIDE 5 MG/1
5 TABLET ORAL EVERY 6 HOURS PRN
Qty: 12 TABLET | Refills: 0 | Status: SHIPPED | OUTPATIENT
Start: 2025-02-27 | End: 2025-03-02

## 2025-02-27 RX ORDER — ACETAMINOPHEN 325 MG/1
650 TABLET ORAL EVERY 6 HOURS PRN
Qty: 40 TABLET | Refills: 0 | Status: SHIPPED | OUTPATIENT
Start: 2025-02-27 | End: 2025-03-04

## 2025-02-27 RX ORDER — ASCORBIC ACID 500 MG
500 TABLET ORAL DAILY
Qty: 30 TABLET | Refills: 3 | Status: SHIPPED | OUTPATIENT
Start: 2025-02-28

## 2025-02-27 RX ORDER — CLOPIDOGREL BISULFATE 75 MG/1
75 TABLET ORAL DAILY
Qty: 30 TABLET | Refills: 3 | Status: SHIPPED | OUTPATIENT
Start: 2025-02-28

## 2025-02-27 RX ORDER — AMIODARONE HYDROCHLORIDE 200 MG/1
400 TABLET ORAL 2 TIMES DAILY
Qty: 84 TABLET | Refills: 0 | Status: SHIPPED | OUTPATIENT
Start: 2025-02-27

## 2025-02-27 RX ADMIN — METOPROLOL TARTRATE 12.5 MG: 25 TABLET, FILM COATED ORAL at 08:28

## 2025-02-27 RX ADMIN — ACETAMINOPHEN 1000 MG: 500 TABLET, FILM COATED ORAL at 08:26

## 2025-02-27 RX ADMIN — FAMOTIDINE 20 MG: 20 TABLET, FILM COATED ORAL at 08:25

## 2025-02-27 RX ADMIN — ASPIRIN 81 MG: 81 TABLET, CHEWABLE ORAL at 08:25

## 2025-02-27 RX ADMIN — SODIUM CHLORIDE, PRESERVATIVE FREE 10 ML: 5 INJECTION INTRAVENOUS at 08:25

## 2025-02-27 RX ADMIN — AMIODARONE HYDROCHLORIDE 400 MG: 200 TABLET ORAL at 08:27

## 2025-02-27 RX ADMIN — CLOPIDOGREL BISULFATE 75 MG: 75 TABLET ORAL at 08:24

## 2025-02-27 RX ADMIN — FUROSEMIDE 20 MG: 20 TABLET ORAL at 11:25

## 2025-02-27 RX ADMIN — CHLORHEXIDINE GLUCONATE 15 ML: 1.2 RINSE ORAL at 08:26

## 2025-02-27 RX ADMIN — SENNOSIDES AND DOCUSATE SODIUM 1 TABLET: 50; 8.6 TABLET ORAL at 08:25

## 2025-02-27 RX ADMIN — GABAPENTIN 100 MG: 100 CAPSULE ORAL at 08:32

## 2025-02-27 RX ADMIN — FERROUS SULFATE TAB 325 MG (65 MG ELEMENTAL FE) 325 MG: 325 (65 FE) TAB at 08:25

## 2025-02-27 RX ADMIN — HEPARIN SODIUM 5000 UNITS: 5000 INJECTION INTRAVENOUS; SUBCUTANEOUS at 06:24

## 2025-02-27 RX ADMIN — OXYCODONE HYDROCHLORIDE AND ACETAMINOPHEN 500 MG: 500 TABLET ORAL at 08:25

## 2025-02-27 ASSESSMENT — PAIN DESCRIPTION - DESCRIPTORS: DESCRIPTORS: SORE

## 2025-02-27 ASSESSMENT — PAIN SCALES - GENERAL: PAINLEVEL_OUTOF10: 3

## 2025-02-27 ASSESSMENT — PAIN - FUNCTIONAL ASSESSMENT: PAIN_FUNCTIONAL_ASSESSMENT: ACTIVITIES ARE NOT PREVENTED

## 2025-02-27 ASSESSMENT — PAIN DESCRIPTION - LOCATION: LOCATION: INCISION

## 2025-02-27 ASSESSMENT — PAIN DESCRIPTION - ORIENTATION: ORIENTATION: ANTERIOR

## 2025-02-27 NOTE — PLAN OF CARE
Problem: Discharge Planning  Goal: Discharge to home or other facility with appropriate resources  2/14/2025 2240 by Cathy Huerta RN  Outcome: Progressing  2/14/2025 2239 by Cathy Huerta RN  Outcome: Progressing  2/14/2025 1635 by Anita Walden RN  Outcome: Progressing     Problem: Pain  Goal: Verbalizes/displays adequate comfort level or baseline comfort level  2/14/2025 2240 by Cathy Huerta RN  Outcome: Progressing  2/14/2025 2239 by Cathy Huerta RN  Outcome: Progressing  Flowsheets (Taken 2/14/2025 1925)  Verbalizes/displays adequate comfort level or baseline comfort level: Encourage patient to monitor pain and request assistance  2/14/2025 1635 by Anita Walden RN  Outcome: Progressing     Problem: ABCDS Injury Assessment  Goal: Absence of physical injury  2/14/2025 2240 by Cathy Huerta RN  Outcome: Progressing  2/14/2025 2239 by Cathy Huerta RN  Outcome: Progressing  2/14/2025 1635 by Anita Walden RN  Outcome: Progressing     Problem: Safety - Adult  Goal: Free from fall injury  2/14/2025 2240 by Cathy Huerta RN  Outcome: Progressing  2/14/2025 2239 by Cathy Huerta RN  Outcome: Progressing  2/14/2025 1635 by Anita Walden RN  Outcome: Progressing     
  Problem: Discharge Planning  Goal: Discharge to home or other facility with appropriate resources  2/17/2025 1523 by Cassandra Zuniga RN  Outcome: Progressing  2/17/2025 0139 by Kathleen Reynoso RN  Outcome: Progressing  Flowsheets (Taken 2/16/2025 2000)  Discharge to home or other facility with appropriate resources: Identify barriers to discharge with patient and caregiver     Problem: Pain  Goal: Verbalizes/displays adequate comfort level or baseline comfort level  2/17/2025 1523 by Cassandra Zuniga RN  Outcome: Progressing  Flowsheets (Taken 2/17/2025 0435 by Kathleen Reynoso RN)  Verbalizes/displays adequate comfort level or baseline comfort level: Encourage patient to monitor pain and request assistance  2/17/2025 0139 by Kathleen Reynoso RN  Outcome: Progressing  Flowsheets  Taken 2/16/2025 2243  Verbalizes/displays adequate comfort level or baseline comfort level:   Encourage patient to monitor pain and request assistance   Assess pain using appropriate pain scale   Administer analgesics based on type and severity of pain and evaluate response   Implement non-pharmacological measures as appropriate and evaluate response  Taken 2/16/2025 2000  Verbalizes/displays adequate comfort level or baseline comfort level:   Encourage patient to monitor pain and request assistance   Assess pain using appropriate pain scale   Administer analgesics based on type and severity of pain and evaluate response   Implement non-pharmacological measures as appropriate and evaluate response   Consider cultural and social influences on pain and pain management     Problem: ABCDS Injury Assessment  Goal: Absence of physical injury  2/17/2025 1523 by Cassandra Zuniga RN  Outcome: Progressing  2/17/2025 0139 by Kathleen Reynoso RN  Outcome: Progressing  Flowsheets (Taken 2/16/2025 2000)  Absence of Physical Injury: Implement safety measures based on patient assessment     Problem: Safety - Adult  Goal: Free from 
  Problem: Discharge Planning  Goal: Discharge to home or other facility with appropriate resources  2/27/2025 0256 by Xuan Pittman RN  Outcome: Progressing     Problem: Pain  Goal: Verbalizes/displays adequate comfort level or baseline comfort level  Recent Flowsheet Documentation  Taken 2/27/2025 0826 by Bria Clement RN  Verbalizes/displays adequate comfort level or baseline comfort level:   Assess pain using appropriate pain scale   Encourage patient to monitor pain and request assistance  2/27/2025 0256 by Xuan Pittman RN  Outcome: Progressing     Problem: ABCDS Injury Assessment  Goal: Absence of physical injury  2/27/2025 1037 by Lily Chowdhury RN  Outcome: Progressing  Flowsheets (Taken 2/27/2025 0835 by Bria Clement RN)  Absence of Physical Injury: Implement safety measures based on patient assessment  2/27/2025 0256 by Xuan Pittman RN  Outcome: Progressing     Problem: Safety - Adult  Goal: Free from fall injury  Recent Flowsheet Documentation  Taken 2/27/2025 0835 by Bria Clement RN  Free From Fall Injury:   Instruct family/caregiver on patient safety   Based on caregiver fall risk screen, instruct family/caregiver to ask for assistance with transferring infant if caregiver noted to have fall risk factors  2/27/2025 0256 by Xuan Pittman RN  Outcome: Progressing     Problem: Nutrition Deficit:  Goal: Optimize nutritional status  2/27/2025 0256 by Xuan Pittman RN  Outcome: Progressing     Problem: Skin/Tissue Integrity  Goal: Skin integrity remains intact  Description: 1.  Monitor for areas of redness and/or skin breakdown  2.  Assess vascular access sites hourly  3.  Every 4-6 hours minimum:  Change oxygen saturation probe site  4.  Every 4-6 hours:  If on nasal continuous positive airway pressure, respiratory therapy assess nares and determine need for appliance change or resting period  Recent Flowsheet Documentation  Taken 2/27/2025 0835 by Bria Clement 
  Problem: Discharge Planning  Goal: Discharge to home or other facility with appropriate resources  Outcome: Progressing  Flowsheets (Taken 2/18/2025 2018)  Discharge to home or other facility with appropriate resources:   Identify barriers to discharge with patient and caregiver   Arrange for needed discharge resources and transportation as appropriate     Problem: Pain  Goal: Verbalizes/displays adequate comfort level or baseline comfort level  Outcome: Progressing  Flowsheets (Taken 2/18/2025 2018)  Verbalizes/displays adequate comfort level or baseline comfort level:   Encourage patient to monitor pain and request assistance   Assess pain using appropriate pain scale     Problem: ABCDS Injury Assessment  Goal: Absence of physical injury  Outcome: Progressing     Problem: Safety - Adult  Goal: Free from fall injury  Outcome: Progressing     
  Problem: Discharge Planning  Goal: Discharge to home or other facility with appropriate resources  Outcome: Progressing  Flowsheets (Taken 2/19/2025 2029)  Discharge to home or other facility with appropriate resources: Identify barriers to discharge with patient and caregiver     Problem: Pain  Goal: Verbalizes/displays adequate comfort level or baseline comfort level  Outcome: Progressing     Problem: ABCDS Injury Assessment  Goal: Absence of physical injury  Outcome: Progressing     Problem: Safety - Adult  Goal: Free from fall injury  Outcome: Progressing     
  Problem: Occupational Therapy - Adult  Goal: By Discharge: Performs self-care activities at highest level of function for planned discharge setting.  See evaluation for individualized goals.  Description: FUNCTIONAL STATUS PRIOR TO ADMISSION:  ambulated with SPC outside of the home or occasionally uses rollator walker to transport items in home, performed all ADLS and light IADLS on his own, home alone during the day while his son works      HOME SUPPORT: Patient lived with son.    Occupational Therapy Goals:  Initiated 2/22/2025  1.  Patient will perform ADLs standing 5 mins without fatigue or LOB with Modified [place] within 7 days.  2.  Patient will perform upper body ADLs with Modified [place] within 7 days.  3.  Patient will perform lower body ADLs with Modified [place] within 7 days.    4.  Patient will perform toilet transfers with Modified [place] within 7 days.  6.  Patient will perform all aspects of toileting with Modified [place] within 7 days.  7.  Patient will participate in cardiac/sternal upper extremity therapeutic exercise/activities to increase independence with ADLs with supervision/set-up for 5 minutes within 7 days.   8.  Patient will adhere to Move in the Tube precautions during functional tasks with no cues within 7 days.      Outcome: Progressing   OCCUPATIONAL THERAPY TREATMENT  Patient: Alexandr Lewis (84 y.o. male)  Date: 2/27/2025  Primary Diagnosis: Internal hernia [K45.8]  Acute coronary syndrome (HCC) [I24.9]  NSTEMI (non-ST elevated myocardial infarction) (HCC) [I21.4]  Procedure(s) (LRB):  OFF PUMP CORONARY ARTERY BYPASS GRAFT (CABG) x3 WITH ENDOSCOPIC VEIN HARVESTING (EVH), LEFT ATRIAL APPENDAGE LIGATION WITH CLIP, WITH CARDIOPLEGIA (NO PAC)(E.R.A.S.) INTRAOPERATIVE FARIDA DONE BY AMBER MORGAN. EVH DONE BY AMBER GOLDSTEIN. (N/A) 6 Days Post-Op   Precautions: Cardiac (\"Move in the tube\" precautions)                Chart, occupational therapy assessment, plan of 
  Problem: Occupational Therapy - Adult  Goal: By Discharge: Performs self-care activities at highest level of function for planned discharge setting.  See evaluation for individualized goals.  Description: FUNCTIONAL STATUS PRIOR TO ADMISSION:  ambulated with SPC outside of the home or occasionally uses rollator walker to transport items in home, performed all ADLS and light IADLS on his own, home alone during the day while his son works      HOME SUPPORT: Patient lived with son.    Occupational Therapy Goals:  Initiated 2/22/2025  1.  Patient will perform ADLs standing 5 mins without fatigue or LOB with Modified [place] within 7 days.  2.  Patient will perform upper body ADLs with Modified [place] within 7 days.  3.  Patient will perform lower body ADLs with Modified [place] within 7 days.    4.  Patient will perform toilet transfers with Modified [place] within 7 days.  6.  Patient will perform all aspects of toileting with Modified [place] within 7 days.  7.  Patient will participate in cardiac/sternal upper extremity therapeutic exercise/activities to increase independence with ADLs with supervision/set-up for 5 minutes within 7 days.   8.  Patient will adhere to Move in the Tube precautions during functional tasks with no cues within 7 days.      Outcome: Progressing   OCCUPATIONAL THERAPY TREATMENT  Patient: Alexandr Lewis (84 y.o. male)  Date: 2/25/2025  Primary Diagnosis: Internal hernia [K45.8]  Acute coronary syndrome (HCC) [I24.9]  NSTEMI (non-ST elevated myocardial infarction) (HCC) [I21.4]  Procedure(s) (LRB):  OFF PUMP CORONARY ARTERY BYPASS GRAFT (CABG) x3 WITH ENDOSCOPIC VEIN HARVESTING (EVH), LEFT ATRIAL APPENDAGE LIGATION WITH CLIP, WITH CARDIOPLEGIA (NO PAC)(E.R.A.S.) INTRAOPERATIVE FARIDA DONE BY AMBER MORGAN. EVH DONE BY AMBER GOLDSTEIN. (N/A) 4 Days Post-Op   Precautions: Cardiac (\"Move in the tube\" precautions)                Chart, occupational therapy assessment, plan of 
  Problem: Pain  Goal: Verbalizes/displays adequate comfort level or baseline comfort level  Outcome: Progressing     Problem: ABCDS Injury Assessment  Goal: Absence of physical injury  Outcome: Progressing     
  Problem: Pain  Goal: Verbalizes/displays adequate comfort level or baseline comfort level  Outcome: Progressing  Flowsheets  Taken 2/16/2025 2243  Verbalizes/displays adequate comfort level or baseline comfort level:   Encourage patient to monitor pain and request assistance   Assess pain using appropriate pain scale   Administer analgesics based on type and severity of pain and evaluate response   Implement non-pharmacological measures as appropriate and evaluate response  Taken 2/16/2025 2000  Verbalizes/displays adequate comfort level or baseline comfort level:   Encourage patient to monitor pain and request assistance   Assess pain using appropriate pain scale   Administer analgesics based on type and severity of pain and evaluate response   Implement non-pharmacological measures as appropriate and evaluate response   Consider cultural and social influences on pain and pain management     Problem: ABCDS Injury Assessment  Goal: Absence of physical injury  Outcome: Progressing  Flowsheets (Taken 2/16/2025 2000)  Absence of Physical Injury: Implement safety measures based on patient assessment     Problem: Safety - Adult  Goal: Free from fall injury  Outcome: Progressing  Flowsheets (Taken 2/16/2025 2000)  Free From Fall Injury: Instruct family/caregiver on patient safety     
  Problem: Physical Therapy - Adult  Goal: By Discharge: Performs mobility at highest level of function for planned discharge setting.  See evaluation for individualized goals.  Description: FUNCTIONAL STATUS PRIOR TO ADMISSION: Patient was modified independent using a single point cane for functional mobility.    HOME SUPPORT PRIOR TO ADMISSION: The patient lived with son and had family members assist with driving to appts/IADLs .    Physical Therapy Goals  Initiated 2/22/2025  1.  Patient will move from supine to sit and sit to supine in bed with modified independence within 5 day(s).    2.  Patient will perform sit to stand with modified independence within 5 day(s).  3.  Patient will transfer from bed to chair and chair to bed with modified independence using the least restrictive device within 5 day(s).  4.  Patient will ambulate with modified independence for 100 feet with the least restrictive device within 5 day(s).   5.  Patient will perform cardiac exercises per protocol with modified independence within 5 days.  6.  Patient will verbally recall and functionally demonstrate mindful-based movements (\"move in the tube\") principles without cues within 5 days.   Outcome: Progressing     PHYSICAL THERAPY EVALUATION    Patient: Alexandr Lewis (84 y.o. male)  Date: 2/22/2025  Primary Diagnosis: Internal hernia [K45.8]  Acute coronary syndrome (HCC) [I24.9]  NSTEMI (non-ST elevated myocardial infarction) (HCC) [I21.4]  Procedure(s) (LRB):  OFF PUMP CORONARY ARTERY BYPASS GRAFT (CABG) x3 WITH ENDOSCOPIC VEIN HARVESTING (EVH), LEFT ATRIAL APPENDAGE LIGATION WITH CLIP, WITH CARDIOPLEGIA (NO PAC)(E.R.A.S.) INTRAOPERATIVE FARIDA DONE BY AMBER MORGAN. EVH DONE BY AMBER GOLDSTEIN. (N/A) 1 Day Post-Op   Precautions: Restrictions/Precautions: None                      ASSESSMENT :   Pt seen for PT evaluation POD 1 s/p CABG x3 and LAAL. Pt encountered semi-reclined in bed on 1LNC in NAD, cleared by RN and agreeable to participate 
  Problem: Physical Therapy - Adult  Goal: By Discharge: Performs mobility at highest level of function for planned discharge setting.  See evaluation for individualized goals.  Description: FUNCTIONAL STATUS PRIOR TO ADMISSION: Patient was modified independent using a single point cane for functional mobility.    HOME SUPPORT PRIOR TO ADMISSION: The patient lived with son and had family members assist with driving to appts/IADLs .    Physical Therapy Goals  Initiated 2/22/2025  1.  Patient will move from supine to sit and sit to supine in bed with modified independence within 5 day(s).    2.  Patient will perform sit to stand with modified independence within 5 day(s).  3.  Patient will transfer from bed to chair and chair to bed with modified independence using the least restrictive device within 5 day(s).  4.  Patient will ambulate with modified independence for 100 feet with the least restrictive device within 5 day(s).   5.  Patient will perform cardiac exercises per protocol with modified independence within 5 days.  6.  Patient will verbally recall and functionally demonstrate mindful-based movements (\"move in the tube\") principles without cues within 5 days.   Outcome: Progressing     PHYSICAL THERAPY TREATMENT    Patient: Alexandr Lewis (84 y.o. male)  Date: 2/24/2025  Diagnosis: Internal hernia [K45.8]  Acute coronary syndrome (HCC) [I24.9]  NSTEMI (non-ST elevated myocardial infarction) (HCC) [I21.4] NSTEMI (non-ST elevated myocardial infarction) (HCC)  Procedure(s) (LRB):  OFF PUMP CORONARY ARTERY BYPASS GRAFT (CABG) x3 WITH ENDOSCOPIC VEIN HARVESTING (EVH), LEFT ATRIAL APPENDAGE LIGATION WITH CLIP, WITH CARDIOPLEGIA (NO PAC)(E.R.A.S.) INTRAOPERATIVE FARIDA DONE BY AMBER MORGAN. EVH DONE BY AMBER GOLDSTEIN. (N/A) 3 Days Post-Op  Precautions: Cardiac (\"Move in the tube\" precautions)                      ASSESSMENT:  Patient continues to benefit from skilled PT services and is progressing towards goals. Pt 
  Problem: Physical Therapy - Adult  Goal: By Discharge: Performs mobility at highest level of function for planned discharge setting.  See evaluation for individualized goals.  Description: FUNCTIONAL STATUS PRIOR TO ADMISSION: Patient was modified independent using a single point cane for functional mobility.    HOME SUPPORT PRIOR TO ADMISSION: The patient lived with son and had family members assist with driving to appts/IADLs .    Physical Therapy Goals  Initiated 2/22/2025  1.  Patient will move from supine to sit and sit to supine in bed with modified independence within 5 day(s).    2.  Patient will perform sit to stand with modified independence within 5 day(s).  3.  Patient will transfer from bed to chair and chair to bed with modified independence using the least restrictive device within 5 day(s).  4.  Patient will ambulate with modified independence for 100 feet with the least restrictive device within 5 day(s).   5.  Patient will perform cardiac exercises per protocol with modified independence within 5 days.  6.  Patient will verbally recall and functionally demonstrate mindful-based movements (\"move in the tube\") principles without cues within 5 days.   Outcome: Progressing     PHYSICAL THERAPY TREATMENT    Patient: Alexandr Lewis (84 y.o. male)  Date: 2/26/2025  Diagnosis: Internal hernia [K45.8]  Acute coronary syndrome (HCC) [I24.9]  NSTEMI (non-ST elevated myocardial infarction) (HCC) [I21.4] NSTEMI (non-ST elevated myocardial infarction) (HCC)  Procedure(s) (LRB):  OFF PUMP CORONARY ARTERY BYPASS GRAFT (CABG) x3 WITH ENDOSCOPIC VEIN HARVESTING (EVH), LEFT ATRIAL APPENDAGE LIGATION WITH CLIP, WITH CARDIOPLEGIA (NO PAC)(E.R.A.S.) INTRAOPERATIVE FARIDA DONE BY AMBER MORGAN. EVH DONE BY AMBER GOLDSTEIN. (N/A) 5 Days Post-Op  Precautions: Cardiac (\"Move in the tube\" precautions)                      ASSESSMENT:  Patient continues to benefit from skilled PT services and is progressing towards goals. Pt 
  Problem: Safety - Adult  Goal: Free from fall injury  2/20/2025 0730 by Giovana Story RN  Outcome: Progressing  2/20/2025 0301 by Willow Layne RN  Outcome: Progressing     Problem: ABCDS Injury Assessment  Goal: Absence of physical injury  2/20/2025 0730 by Giovana Story RN  Outcome: Progressing  2/20/2025 0301 by Willow Layne RN  Outcome: Progressing     
End of Shift Note    Bedside shift change report given to  Kaitlin EVANS      (oncoming nurse) by Erlinda Baumann RN (offgoing nurse).  Report included the following information Nurse Handoff Report    Shift worked:  Night shift      Shift summary and any significant changes:     RN noticed that signed and held orders fpr 2/17 were not released. RN released them @ 0116. UA sent to lab.  Patient had low pressures, RN held dose of nitro paste.      Concerns for physician to address:  N/a     Zone phone for oncoming shift:   [phone]       Activity:  Activity: In bed  Number times ambulated in hallways past shift: 0  Number of times OOB to chair past shift: 0    Cardiac:   Cardiac Monitoring: Yes      Cardiac Rhythm: Sinus rhythm    Access:  Current line(s): PIV     Genitourinary:   Urinary status: voiding    Respiratory:   O2 Device: None (Room air)  Chronic home O2 use?: NO  Incentive spirometer at bedside: YES       GI:  Last BM (including prior to admit): 02/16/25  Current diet:    ADULT DIET; Regular; Low Fat/Low Chol/High Fiber/TINY  Passing flatus: YES  Tolerating current diet: YES       Pain Management:   Patient states pain is manageable on current regimen: YES    Skin:  Rudy Scale Score: 22  Interventions: increase time out of bed    Patient Safety:  Fall Score: Doyle Total Score: 60  Interventions: bed/chair alarm, assistive device (walker, cane. etc), gripper socks, and pt to call before getting OOB       Length of Stay:  Expected LOS: 6  Actual LOS: 3      Erlinda Baumann RN  Predictive Model Details          25 (Normal)  Factor Value    Calculated 2/17/2025 22:02 54% Age 84 years old    Deterioration Index Model 17% Respiratory rate 19     11% Potassium 4.5 mmol/L     7% Hematocrit abnormal (28.2 %)     5% Pulse oximetry 100 %     4% BUN abnormal (24 MG/DL)     1% Pulse 65     1% Systolic 109     1% Blood pH 7.40       0% Temperature 97.7 °F (36.5 °C)     0% Sodium 141 mmol/L     0% WBC count 6.4 K/uL  
Problem: Occupational Therapy - Adult  Goal: By Discharge: Performs self-care activities at highest level of function for planned discharge setting.  See evaluation for individualized goals.  Description: FUNCTIONAL STATUS PRIOR TO ADMISSION:  ambulated with SPC outside of the home or occasionally uses rollator walker to transport items in home, performed all ADLS and light IADLS on his own, home alone during the day while his son works      HOME SUPPORT: Patient lived with son.    Occupational Therapy Goals:  Initiated 2/22/2025  1.  Patient will perform ADLs standing 5 mins without fatigue or LOB with Modified [place] within 7 days.  2.  Patient will perform upper body ADLs with Modified [place] within 7 days.  3.  Patient will perform lower body ADLs with Modified [place] within 7 days.    4.  Patient will perform toilet transfers with Modified [place] within 7 days.  6.  Patient will perform all aspects of toileting with Modified [place] within 7 days.  7.  Patient will participate in cardiac/sternal upper extremity therapeutic exercise/activities to increase independence with ADLs with supervision/set-up for 5 minutes within 7 days.   8.  Patient will adhere to Move in the Tube precautions during functional tasks with no cues within 7 days.      Outcome: Progressing   OCCUPATIONAL THERAPY TREATMENT  Patient: Alexandr Lewis (84 y.o. male)  Date: 2/24/2025  Primary Diagnosis: Internal hernia [K45.8]  Acute coronary syndrome (HCC) [I24.9]  NSTEMI (non-ST elevated myocardial infarction) (HCC) [I21.4]  Procedure(s) (LRB):  OFF PUMP CORONARY ARTERY BYPASS GRAFT (CABG) x3 WITH ENDOSCOPIC VEIN HARVESTING (EVH), LEFT ATRIAL APPENDAGE LIGATION WITH CLIP, WITH CARDIOPLEGIA (NO PAC)(E.R.A.S.) INTRAOPERATIVE FARIDA DONE BY AMBER MORGAN. EVH DONE BY AMBER GOLDSTEIN. (N/A) 3 Days Post-Op   Precautions: Cardiac (\"Move in the tube\" precautions)                Chart, occupational therapy assessment, plan of 
Problem: Occupational Therapy - Adult  Goal: By Discharge: Performs self-care activities at highest level of function for planned discharge setting.  See evaluation for individualized goals.  Description: FUNCTIONAL STATUS PRIOR TO ADMISSION:  ambulated with SPC outside of the home or occasionally uses rollator walker to transport items in home, performed all ADLS and light IADLS on his own, home alone during the day while his son works      HOME SUPPORT: Patient lived with son.    Occupational Therapy Goals:  Initiated 2/22/2025  1.  Patient will perform ADLs standing 5 mins without fatigue or LOB with Modified [place] within 7 days.  2.  Patient will perform upper body ADLs with Modified [place] within 7 days.  3.  Patient will perform lower body ADLs with Modified [place] within 7 days.    4.  Patient will perform toilet transfers with Modified [place] within 7 days.  6.  Patient will perform all aspects of toileting with Modified [place] within 7 days.  7.  Patient will participate in cardiac/sternal upper extremity therapeutic exercise/activities to increase independence with ADLs with supervision/set-up for 5 minutes within 7 days.   8.  Patient will adhere to Move in the Tube precautions during functional tasks with no cues within 7 days.      Outcome: Progressing  OCCUPATIONAL THERAPY EVALUATION    Patient: Alexandr Lewis (84 y.o. male)  Date: 2/22/2025  Primary Diagnosis: Internal hernia [K45.8]  Acute coronary syndrome (HCC) [I24.9]  NSTEMI (non-ST elevated myocardial infarction) (HCC) [I21.4]  Procedure(s) (LRB):  OFF PUMP CORONARY ARTERY BYPASS GRAFT (CABG) x3 WITH ENDOSCOPIC VEIN HARVESTING (EVH), LEFT ATRIAL APPENDAGE LIGATION WITH CLIP, WITH CARDIOPLEGIA (NO PAC)(E.R.A.S.) INTRAOPERATIVE FARIDA DONE BY AMBER MORGAN. EVH DONE BY AMBER GOLDSTEIN. (N/A) 1 Day Post-Op     Precautions: Cardiac (\"Move in the tube\" precautions)                  ASSESSMENT :  The patient is limited by 
                      []                             []                                Scapular elevation  1  10  [x]                             []                              []                             []                                Scapular retraction  1  10 [x]                             []                             []                             []                                Trunk rotation  1  5  [x]                             []                             []                             []                                Trunk sidebending  1  5  [x]                             []                              []                             []                                                                                                                                                                                                                                                                       Pain Ratin/10     Pain Intervention(s):       Activity Tolerance:   WNL and SpO2 stable on room air    After treatment:   Patient left in no apparent distress sitting up in chair, Call bell within reach, Bed/ chair alarm activated, and Updated patient's board on functional status and mobility recommendations      COMMUNICATION/EDUCATION:   The patient's plan of care was discussed with: registered nurse    Patient Education  Education Given To: Patient  Education Provided: Role of Therapy;Plan of Care;Home Exercise Program;Precautions;Transfer Training;Fall Prevention Strategies;Mobility Training  Education Provided Comments: \"Move in the tube\" precautions; cardiac exercises  Education Method: Verbal;Demonstration  Barriers to Learning: None  Education Outcome: Verbalized understanding;Demonstrated understanding      MEGHAN HWEELER, PT  Minutes: 23    
shoulder flexion.    Pt instructed that they may have to adjust home setup to increase ease with items closer to waist height to prevent deep bending and asymmetrical UE WB/pushing for stabilization during bending.     Pain Ratin/10   Pain Intervention(s):   pain is at a level acceptable to the patient      Activity Tolerance:   Fair , requires rest breaks, and SpO2 stable on room air  Please refer to the flowsheet for vital signs taken during this treatment.    After treatment:   Patient left sitting EOB with PT    COMMUNICATION/EDUCATION:   The patient's plan of care was discussed with: physical therapist and registered nurse    Patient Education  Education Given To: Patient  Education Provided: Role of Therapy;Plan of Care;Precautions;ADL Adaptive Strategies;Energy Conservation;Transfer Training  Education Method: Verbal;Demonstration  Barriers to Learning: Hearing  Education Outcome: Verbalized understanding;Demonstrated understanding;Continued education needed    Thank you for this referral.  Meghna Byrne OT  Minutes: 24

## 2025-02-27 NOTE — PROGRESS NOTES
Progress Note      2/17/2025 8:19 AM  NAME: Alexandr Lewis   MRN:  [id number]   Admit Diagnosis: Internal hernia [K45.8]  Acute coronary syndrome (HCC) [I24.9]  NSTEMI (non-ST elevated myocardial infarction) (HCC) [I21.4]      Problem List:   - Chest pain  - NSTEMI     - Stress 7/2024 no ischemia, EF 54%  - Echo 7/2024 nl EF  - sinus with anterior TWI  - HTN  - Dyslipidemia  - CKD with cr cl of 35, cr of 1.8  - s/p hernia repair, SBO/ileus, ?mesenteric ischmia  - Quit tobacco in past can not tell me when  - No etoh, no drugs     in 2021, 3 kids, retired , uses a cane/walker     Assessment/Plan:   - Chest pain, ECG with ant TWI, increased troponin to 929  - Euvolemic  - Sinus     - Rec Cath all r/b/a reviewed, increased risk of CASIMIRO discussed     - Heparin until cath  - Cont asa  - Cont metoprolol suc 12.5mg, watch for sosa  - Hold lisinopril HCT  - Add NTP  - Hydration for cath  - Cont atorvastatin         [x]       High complexity decision making was performed in this patient at high risk for decompensation with multiple organ involvement.    Subjective:     Alexandr Lewis denies chest pain, dyspnea.  Discussed with RN events overnight.     Review of Systems:    Symptom Y/N Comments  Symptom Y/N Comments   Fever/Chills N   Chest Pain N    Poor Appetite N   Edema N    Cough N   Abdominal Pain N    Sputum N   Joint Pain N    SOB/MOBLEY N   Pruritis/Rash N    Nausea/vomit N   Tolerating PT/OT Y    Diarrhea N   Tolerating Diet Y    Constipation N   Other       Could NOT obtain due to:      Objective:      Physical Exam:    Last 24hrs VS reviewed since prior progress note. Most recent are:    /67   Pulse 57   Temp 97.7 °F (36.5 °C) (Oral)   Resp 18   Ht 1.727 m (5' 8\")   Wt 71.3 kg (157 lb 3 oz)   SpO2 93%   BMI 23.90 kg/m²     Intake/Output Summary (Last 24 hours) at 2/17/2025 0819  Last data filed at 2/17/2025 0435  Gross per 24 hour   Intake 3873.29 ml   Output 1200 ml   Net 2673.29 
        Progress Note      2/18/2025 7:17 AM  NAME: Alexandr Lewis   MRN:  [id number]   Admit Diagnosis: Internal hernia [K45.8]  Acute coronary syndrome (HCC) [I24.9]  NSTEMI (non-ST elevated myocardial infarction) (HCC) [I21.4]      Problem List:   - Chest pain  - NSTEMI     - Stress 7/2024 no ischemia, EF 54%  - Echo 7/2024 nl EF  - sinus with anterior TWI  - HTN  - Dyslipidemia  - CKD with cr cl of 35, cr of 1.8  - s/p hernia repair, SBO/ileus, ?mesenteric ischmia  - Quit tobacco in past can not tell me when  - No etoh, no drugs     in 2021, 3 kids, retired , uses a cane/walker     Assessment/Plan:   - Chest pain, ECG with ant TWI, increased troponin to 929  - Euvolemic  - Sinus      Cath: 2/17:   Heavily calcified 3v CAD, nm     - Heparin until cath  - Cont asa  - Cont metoprolol suc 12.5mg, watch for sosa  - Hold lisinopril HCT  - Nitro paste   - Cont atorvastatin  - monitor creatinine              [x]       High complexity decision making was performed in this patient at high risk for decompensation with multiple organ involvement.    Subjective:     Alexandr Lewis denies chest pain, dyspnea.  Discussed with RN events overnight.   Reports constipation  Creatinine slightly up     Review of Systems:    Symptom Y/N Comments  Symptom Y/N Comments   Fever/Chills N   Chest Pain N    Poor Appetite N   Edema N    Cough N   Abdominal Pain N    Sputum N   Joint Pain N    SOB/MOBLEY N   Pruritis/Rash N    Nausea/vomit N   Tolerating PT/OT Y    Diarrhea N   Tolerating Diet Y    Constipation N   Other       Could NOT obtain due to:      Objective:      Physical Exam:    Last 24hrs VS reviewed since prior progress note. Most recent are:    BP (!) 107/49   Pulse 70   Temp 98.1 °F (36.7 °C) (Oral)   Resp 16   Ht 1.727 m (5' 8\")   Wt 70.7 kg (155 lb 13.8 oz)   SpO2 100%   BMI 23.70 kg/m²     Intake/Output Summary (Last 24 hours) at 2/18/2025 0717  Last data filed at 2/17/2025 1216  Gross per 24 hour 
        Progress Note      2/19/2025 7:33 AM  NAME: Alexandr Lewis   MRN:  [id number]   Admit Diagnosis: Internal hernia [K45.8]  Acute coronary syndrome (HCC) [I24.9]  NSTEMI (non-ST elevated myocardial infarction) (HCC) [I21.4]      Problem List:   - Chest pain  - NSTEMI     - Stress 7/2024 no ischemia, EF 54%  - Echo 7/2024 nl EF  - sinus with anterior TWI  - HTN  - Dyslipidemia  - CKD with cr cl of 35, cr of 1.8  - s/p hernia repair, SBO/ileus, ?mesenteric ischmia  - Quit tobacco in past can not tell me when  - No etoh, no drugs     in 2021, 3 kids, retired , uses a cane/walker     Assessment/Plan:   - Chest pain, ECG with ant TWI, increased troponin to 929  - Euvolemic  - Sinus      Cath: 2/17:   Heavily calcified 3v CAD, nm     Some CP earlier this AM; resolved now.    - CABG eval underway  - Heparin  - Cont asa  - Cont metoprolol suc 12.5mg, watch for sosa  - Hold lisinopril HCT  - Nitro paste   - Cont atorvastatin  - monitor creatinine            [x]       High complexity decision making was performed in this patient at high risk for decompensation with multiple organ involvement.    Subjective:     Alxeandr Lewis denies chest pain, dyspnea.  Discussed with RN events overnight.   Reports constipation  Creatinine slightly up     Review of Systems:    Symptom Y/N Comments  Symptom Y/N Comments   Fever/Chills N   Chest Pain N    Poor Appetite N   Edema N    Cough N   Abdominal Pain N    Sputum N   Joint Pain N    SOB/MOBLEY N   Pruritis/Rash N    Nausea/vomit N   Tolerating PT/OT Y    Diarrhea N   Tolerating Diet Y    Constipation N   Other       Could NOT obtain due to:      Objective:      Physical Exam:    Last 24hrs VS reviewed since prior progress note. Most recent are:    /63   Pulse 64   Temp 97.9 °F (36.6 °C) (Oral)   Resp 18   Ht 1.727 m (5' 8\")   Wt 70.7 kg (155 lb 13.8 oz)   SpO2 100%   BMI 23.70 kg/m²     Intake/Output Summary (Last 24 hours) at 2/19/2025 2800  Last data 
        Progress Note      2/20/2025 9:55 AM  NAME: Alexandr Lewis   MRN:  [id number]   Admit Diagnosis: Internal hernia [K45.8]  Acute coronary syndrome (HCC) [I24.9]  NSTEMI (non-ST elevated myocardial infarction) (HCC) [I21.4]      Problem List:   - Chest pain  - NSTEMI     - Stress 7/2024 no ischemia, EF 54%  - Echo 7/2024 nl EF  - sinus with anterior TWI  - HTN  - Dyslipidemia  - CKD with cr cl of 35, cr of 1.8  - s/p hernia repair, SBO/ileus, ?mesenteric ischmia  - Quit tobacco in past can not tell me when  - No etoh, no drugs     in 2021, 3 kids, retired , uses a cane/walker     Assessment/Plan:   - Chest pain, ECG with ant TWI, increased troponin to 929  - Euvolemic  - Sinus      Cath: 2/17:   Heavily calcified 3v CAD, nm     Some CP earlier this AM; resolved now.    - CABG tentatively tomorrow  - Heparin  - Cont asa  - Cont metoprolol suc 12.5mg, watch for sosa  - Hold lisinopril HCT  - Nitro paste   - Cont atorvastatin  - monitor creatinine            [x]       High complexity decision making was performed in this patient at high risk for decompensation with multiple organ involvement.    Subjective:     Alexandr Lewis denies chest pain, dyspnea.  Discussed with RN events overnight.   Reports constipation  Creatinine slightly up     Review of Systems:    Symptom Y/N Comments  Symptom Y/N Comments   Fever/Chills N   Chest Pain N    Poor Appetite N   Edema N    Cough N   Abdominal Pain N    Sputum N   Joint Pain N    SOB/MOBLEY N   Pruritis/Rash N    Nausea/vomit N   Tolerating PT/OT Y    Diarrhea N   Tolerating Diet Y    Constipation N   Other       Could NOT obtain due to:      Objective:      Physical Exam:    Last 24hrs VS reviewed since prior progress note. Most recent are:    BP (!) 99/47   Pulse 57   Temp 97.8 °F (36.6 °C) (Oral)   Resp 17   Ht 1.727 m (5' 8\")   Wt 70.7 kg (155 lb 13.8 oz)   SpO2 100%   BMI 23.70 kg/m²     Intake/Output Summary (Last 24 hours) at 2/20/2025 
      Hospitalist Progress Note    NAME:   Alexandr Lewis   : 1940   MRN: [id number]     Date/Time: 2025 4:25 PM  Patient PCP: Valentina Hanson APRN - NP    Estimated discharge date:  Barriers: cath on Monday       Assessment / Plan:  Three-vessel CAD  NSTEMI  Essential hypertension  Hyperlipidemia   Full-strength aspirin given in ED follow with 81 mg daily thereafter  Defer P2 Y12 inhibitor to cardiology preference  On  ACS heparin  Continue PTA atorvastatin, lisinopril hydrochlorothiazide (formulary substitution with components permitted)  Continue Nitro-Bid  Cardiology consulted, greatly appreciate their expertise  Trend troponin  Obtain TTE  N.p.o. after midnight   HbA1c is 5.6   : Status post cardiac cath which showed three-vessel occlusion, CT surgery consulted  Currently undergoing preop testing  Spoke with patient granddaughter at bedside, all questions answered  Continue with heparin drip per cardiology    Iron deficiency anemia  Continue PTA p.o. iron supplementation     Internal hernia  No radiographic evidence of obstruction-will consult general surgery routinely  Surgery input reviewed, no surgical plan     CKD 3, at baseline  Trend renal function  Renally dose medications avoid nephrotoxic agents     Medical Decision Making:   I personally reviewed labs: Yes, as listed below  I personally reviewed imaging: CT abdomen pelvis  Toxic drug monitoring: PTT while on heparin  Discussed case with: Patient, RN, and granddaughter  Code Status: Full  DVT Prophylaxis: Heparin  GI Prophylaxis: Not indicated  Baseline: Independent     Code Status:   DVT Prophylaxis:   GI Prophylaxis:    Subjective:     Chief Complaint / Reason for Physician Visit  \"Follow-up NSTEMI\".  Discussed with RN events overnight.   Status post cardiac cath which showed three-vessel disease  No acute chest pain    Objective:     VITALS:   Last 24hrs VS reviewed since prior progress note. Most recent are:  Patient 
      Hospitalist Progress Note    NAME:   Alexandr Lewis   : 1940   MRN: [id number]     Date/Time: 2025 1:47 PM  Patient PCP: Valentina Hanson APRN - NP    Estimated discharge date:  Barriers: cath on Monday       Assessment / Plan:  Three-vessel CAD  NSTEMI  Essential hypertension  Hyperlipidemia   Full-strength aspirin given in ED follow with 81 mg daily thereafter  Defer P2 Y12 inhibitor to cardiology preference  On  ACS heparin  Continue PTA atorvastatin, lisinopril hydrochlorothiazide (formulary substitution with components permitted)  Continue Nitro-Bid  Cardiology consulted, greatly appreciate their expertise  Trend troponin  Obtain TTE  N.p.o. after midnight   HbA1c is 5.6   : Status post cardiac cath which showed three-vessel occlusion, CT surgery consulted  Currently undergoing preop testing  Spoke with patient granddaughter at bedside, all questions answered  Continue with heparin drip per cardiology    Iron deficiency anemia  Continue PTA p.o. iron supplementation     Internal hernia  No radiographic evidence of obstruction-will consult general surgery routinely  Surgery input reviewed, no surgical plan     CKD 3, at baseline  Trend renal function  Renally dose medications avoid nephrotoxic agents     Medical Decision Making:   I personally reviewed labs: Yes, as listed below  I personally reviewed imaging: CT abdomen pelvis  Toxic drug monitoring: PTT while on heparin  Discussed case with: Patient, RN, and granddaughter  Code Status: Full  DVT Prophylaxis: Heparin  GI Prophylaxis: Not indicated  Baseline: Independent     Code Status:   DVT Prophylaxis:   GI Prophylaxis:    Subjective:     Chief Complaint / Reason for Physician Visit  \"Follow-up NSTEMI\".  Discussed with RN events overnight.   Status post cardiac cath which showed three-vessel disease  No acute issues    Objective:     VITALS:   Last 24hrs VS reviewed since prior progress note. Most recent are:  Patient Vitals 
      Hospitalist Progress Note    NAME:   Alexandr Lewis   : 1940   MRN: [id number]     Date/Time: 2/15/2025 12:25 PM  Patient PCP: Valentina Hanson APRN - NP    Estimated discharge date:  Barriers: cath on Monday       Assessment / Plan:  NSTEMI  Essential hypertension  Hyperlipidemia   Full-strength aspirin given in ED follow with 81 mg daily thereafter  Defer P2 Y12 inhibitor to cardiology preference  On  ACS heparin  Continue PTA atorvastatin, lisinopril hydrochlorothiazide (formulary substitution with components permitted)  Continue Nitro-Bid  Cardiology consulted, greatly appreciate their expertise  Trend troponin  Obtain TTE  N.p.o. after midnight   HbA1c is 5.6     Iron deficiency anemia  Continue PTA p.o. iron supplementation     Internal hernia  No radiographic evidence of obstruction-will consult general surgery routinely  Surgery input reviewed, no surgical plan     CKD 3, at baseline  Trend renal function  Renally dose medications avoid nephrotoxic agents     Medical Decision Making:   I personally reviewed labs: Yes, as listed below  I personally reviewed imaging: CT abdomen pelvis  Toxic drug monitoring: PTT while on heparin  Discussed case with: Patient, RN  Code Status: Full  DVT Prophylaxis: Heparin  GI Prophylaxis: Not indicated  Baseline: Independent     Code Status:   DVT Prophylaxis:   GI Prophylaxis:    Subjective:     Chief Complaint / Reason for Physician Visit  \"Follow-up NSTEMI\".  Discussed with RN events overnight.   No acute chest pain    Objective:     VITALS:   Last 24hrs VS reviewed since prior progress note. Most recent are:  Patient Vitals for the past 24 hrs:   BP Temp Temp src Pulse Resp SpO2   02/15/25 1100 (!) 109/51 97.9 °F (36.6 °C) Oral 61 17 99 %   02/15/25 1020 (!) 104/53 -- -- 66 19 100 %   02/15/25 0744 (!) 106/53 98 °F (36.7 °C) Oral 60 17 98 %   02/15/25 0318 -- 98 °F (36.7 °C) Oral 63 -- --   02/15/25 0253 131/70 -- -- 61 -- --   25 2259 (!) 
      Hospitalist Progress Note    NAME:   Alexandr Lewis   : 1940   MRN: [id number]     Date/Time: 2025 3:53 PM  Patient PCP: Valentina Hanson APRN - NP    Estimated discharge date:  Barriers:  CABG Thursday or Friday       Assessment / Plan:  Three-vessel CAD  NSTEMI  Essential hypertension  Hyperlipidemia   Full-strength aspirin given in ED follow with 81 mg daily thereafter  Defer P2 Y12 inhibitor to cardiology preference  On  ACS heparin  Continue PTA atorvastatin, lisinopril hydrochlorothiazide (formulary substitution with components permitted)  Continue Nitro-Bid  Cardiology consulted, greatly appreciate their expertise  Trend troponin  Obtain TTE  N.p.o. after midnight   HbA1c is 5.6   : Status post cardiac cath which showed three-vessel occlusion, CT surgery consulted  Currently undergoing preop testing  Spoke with patient granddaughter at bedside, all questions answered  Continue with heparin drip per cardiology  : Plan for CABG either Thursday or Friday, discussed with the CT surgeon NP  Iron deficiency anemia  Continue PTA p.o. iron supplementation     Internal hernia  No radiographic evidence of obstruction-will consult general surgery routinely  Surgery input reviewed, no surgical plan     CKD 3, at baseline  Trend renal function  Renally dose medications avoid nephrotoxic agents     Medical Decision Making:   I personally reviewed labs: Yes, as listed below  I personally reviewed imaging: CT abdomen pelvis  Toxic drug monitoring: PTT while on heparin  Discussed case with: Patient, RN, and granddaughter  Code Status: Full  DVT Prophylaxis: Heparin  GI Prophylaxis: Not indicated  Baseline: Independent     Code Status:   DVT Prophylaxis:   GI Prophylaxis:    Subjective:     Chief Complaint / Reason for Physician Visit  \"Follow-up NSTEMI\".  Discussed with RN events overnight.   Status post cardiac cath which showed three-vessel disease  No acute issues    Objective: 
      Hospitalist Progress Note    NAME:   Alexandr Lewis   : 1940   MRN: [id number]     Date/Time: 2025 2:41 PM  Patient PCP: Valentina Hanson APRN - NP    Estimated discharge date:  Barriers: cath on Monday       Assessment / Plan:  NSTEMI  Essential hypertension  Hyperlipidemia   Full-strength aspirin given in ED follow with 81 mg daily thereafter  Defer P2 Y12 inhibitor to cardiology preference  On  ACS heparin  Continue PTA atorvastatin, lisinopril hydrochlorothiazide (formulary substitution with components permitted)  Continue Nitro-Bid  Cardiology consulted, greatly appreciate their expertise  Trend troponin  Obtain TTE  N.p.o. after midnight   HbA1c is 5.6     Iron deficiency anemia  Continue PTA p.o. iron supplementation     Internal hernia  No radiographic evidence of obstruction-will consult general surgery routinely  Surgery input reviewed, no surgical plan     CKD 3, at baseline  Trend renal function  Renally dose medications avoid nephrotoxic agents     Medical Decision Making:   I personally reviewed labs: Yes, as listed below  I personally reviewed imaging: CT abdomen pelvis  Toxic drug monitoring: PTT while on heparin  Discussed case with: Patient, RN  Code Status: Full  DVT Prophylaxis: Heparin  GI Prophylaxis: Not indicated  Baseline: Independent     Code Status:   DVT Prophylaxis:   GI Prophylaxis:    Subjective:     Chief Complaint / Reason for Physician Visit  \"Follow-up NSTEMI\".  Discussed with RN events overnight.   No acute issues except for being constipated    Objective:     VITALS:   Last 24hrs VS reviewed since prior progress note. Most recent are:  Patient Vitals for the past 24 hrs:   BP Temp Temp src Pulse Resp SpO2   25 1400 113/65 -- -- 57 -- 99 %   25 1200 (!) 98/54 -- -- 62 -- 100 %   25 1139 117/69 98.1 °F (36.7 °C) Oral 64 18 100 %   25 1000 116/62 -- -- 65 -- 100 %   25 0800 129/67 -- -- 55 -- 99 %   25 0747 109/63 98 °F 
      Hospitalist Progress Note    NAME:   Alexandr Lewis   : 1940   MRN: [id number]     Date/Time: 2025 9:38 PM  Patient PCP: Valentina Hanson APRN - NP    Estimated discharge date:  Barriers:       Assessment / Plan:    Three-vessel CAD  NSTEMI  Essential hypertension   -- Continue lisinopril hydrochlorothiazide (formulary substitution with components permitted)  -- Continue Nitro-Bid  -- Cardiology is following, cardiothoracic surgery planning on CABG tomorrow.  -- : Status post cardiac cath which showed three-vessel occlusion, CT surgery consulted  -- Continue with heparin drip per cardiology  -- Plan for CABG   Friday, discussed with the CT surgeon NP    Iron deficiency anemia  -- Continue PTA p.o. iron supplementation     HLD, C/W statin    Internal hernia  No radiographic evidence of obstruction-will consult general surgery routinely  Surgery input reviewed, no surgical plan     CKD 3, at baseline  Trend renal function  Renally dose medications avoid nephrotoxic agents    Medical Decision Making:   I personally reviewed labs: CBC, BMP    Discussed case with: pt        Code Status: Full  DVT Prophylaxis: Heparin  GI Prophylaxis: PPI    Subjective:     Seen and evaluated patient at bedside, patient was laying in bed comfortably.  On room air, denies any chest pain.      Objective:     VITALS:   Last 24hrs VS reviewed since prior progress note. Most recent are:  Patient Vitals for the past 24 hrs:   BP Temp Temp src Pulse Resp SpO2 Height Weight   25 2100 98/83 -- -- 51 -- 96 % -- --   25 (!) 133/50 97.9 °F (36.6 °C) Oral 52 18 97 % -- --   25 1900 102/72 -- -- 57 -- 97 % -- --   25 1830 115/83 -- -- 53 -- (!) 84 % -- --   25 1815 125/79 -- -- 57 -- 97 % -- --   25 1800 108/65 -- -- 60 -- 97 % -- --   25 1745 92/80 -- -- 61 -- 96 % -- --   25 1730 94/70 -- -- 72 -- 95 % -- --   25 108/67 -- -- (!) 49 -- 97 % -- --   25 -- 
   02/22/25 0938 02/22/25 0942 02/22/25 0945   Vital Signs   Pulse  --  80  --    BP (!) 92/57 (!) 89/48 (!) 93/56   MAP (Calculated) 69 62 68   BP Location Right upper arm Right upper arm Right upper arm   BP Method Automatic Automatic Automatic   Patient Position Sitting Standing  (after stand turn to in front of chair) Sitting       
  ICU Progress Note        Subjective:      - Sitting comfortably on the bed.     HPI  Alexandr Lewis is 84 y.o. male with h/o SBO, inguinal hernia status post mesh repair , CKD stage III, hypertension, hyperlipidemia.Patient presented to the emergency department 25 with complaints of acute onset chest pressure/burning. 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  Postoperatively admitted to CVICU.  Critical care consult is requested for comanagement.  Patient is currently intubated, sedation is being weaned off.  I obtained all the history from chart review.    Vital Signs:    BP (!) 93/53   Pulse 80   Temp 97.9 °F (36.6 °C)   Resp 18   Ht 1.651 m (5' 5\")   Wt 73.1 kg (161 lb 2.5 oz)   SpO2 97%   BMI 26.82 kg/m²             Temp (24hrs), Av °F (36.1 °C), Min:93.2 °F (34 °C), Max:98.1 °F (36.7 °C)       Intake/Output:   Last shift:      No intake/output data recorded.  Last 3 shifts:  190 -  0700  In: 8302.1 [P.O.:2190; I.V.:2736.1]  Out: 3595 [Urine:2677]    Intake/Output Summary (Last 24 hours) at 2025 0810  Last data filed at 2025 0700  Gross per 24 hour   Intake 6880.47 ml   Output 2270 ml   Net 4610.47 ml       Physical Exam:    General:  Not in acute distress  HEENT:  Anicteric sclerae; pink palpebral conjunctivae  Resp:  Bilateral air entry +, no crackles or wheeze  CV:  S1, S2 present  GI:  Abdomen soft, non-tender; (+) active bowel sounds  Extremities:  No Cyanosis  Skin:  Warm; no rashes/ lesions noted  Neurologic:  Alert, awake and oriented.    DATA:     Current Facility-Administered Medications   Medication Dose Route Frequency    phenylephrine (CHE-SYNEPHRINE) 50 mg in sodium chloride 0.9 % 250 mL infusion ([id number])   mcg/min IntraVENous Continuous    norepinephrine (LEVOPHED) 16 mg in sodium chloride 0.9 % 250 mL infusion (premix)  1-100 mcg/min IntraVENous Continuous    VASOpressin 20 Units in sodium chloride 0.9 % 100 mL infusion  
  ICU Progress Note        Subjective:      - Sitting comfortably on the bed.    - Sitting comfortably on the bed, levophed down to 6 mcg/min. On fixed dose vasopressin as well.     HPI  Alexandr Lewis is 84 y.o. male with h/o SBO, inguinal hernia status post mesh repair , CKD stage III, hypertension, hyperlipidemia.Patient presented to the emergency department 25 with complaints of acute onset chest pressure/burning. 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  Postoperatively admitted to CVICU.  Critical care consult is requested for comanagement.  Patient is currently intubated, sedation is being weaned off.  I obtained all the history from chart review.    Vital Signs:    /77   Pulse 76   Temp 97.6 °F (36.4 °C) (Oral)   Resp 14   Ht 1.651 m (5' 5\")   Wt 73.7 kg (162 lb 7.7 oz)   SpO2 97%   BMI 27.04 kg/m²             Temp (24hrs), Av.1 °F (36.7 °C), Min:97.6 °F (36.4 °C), Max:98.3 °F (36.8 °C)       Intake/Output:   Last shift:       07 - 1900  In: -   Out: 125 [Urine:125]  Last 3 shifts: 1901 -  0700  In: 5303.9 [P.O.:1460; I.V.:1422.3]  Out: 3288 [Urine:1508]    Intake/Output Summary (Last 24 hours) at 2025 1130  Last data filed at 2025 1036  Gross per 24 hour   Intake 1503.39 ml   Output 2165 ml   Net -661.61 ml       Physical Exam:    General:  Not in acute distress  HEENT:  Anicteric sclerae; pink palpebral conjunctivae  Resp:  Bilateral air entry +, no crackles or wheeze  CV:  S1, S2 present  GI:  Abdomen soft, non-tender; (+) active bowel sounds  Extremities:  No Cyanosis  Skin:  Warm; no rashes/ lesions noted  Neurologic:  Alert, awake and oriented.    DATA:     Current Facility-Administered Medications   Medication Dose Route Frequency    hydrocortisone sodium succinate PF (SOLU-CORTEF) injection 25 mg  25 mg IntraVENous Q6H    phenylephrine (CHE-SYNEPHRINE) 50 mg in sodium chloride 0.9 % 250 mL infusion ([id number])   
  ICU Progress Note        Subjective:      - Sitting comfortably on the bed. On levophed 20 mcg/min   - Sitting comfortably on the bed, levophed down to 6 mcg/min. On fixed dose vasopressin as well.    - Down to levophed 1 mcg/min.     HPI  Alexandr Lewis is 84 y.o. male with h/o SBO, inguinal hernia status post mesh repair , CKD stage III, hypertension, hyperlipidemia.Patient presented to the emergency department 25 with complaints of acute onset chest pressure/burning. 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  Postoperatively admitted to CVICU.  Critical care consult is requested for comanagement.  Patient is currently intubated, sedation is being weaned off.  I obtained all the history from chart review.    Vital Signs:    BP (!) 101/53   Pulse 55   Temp 98.1 °F (36.7 °C) (Oral)   Resp 18   Ht 1.651 m (5' 5\")   Wt 78.6 kg (173 lb 4.5 oz)   SpO2 99%   BMI 28.84 kg/m²             Temp (24hrs), Av.7 °F (36.5 °C), Min:97.2 °F (36.2 °C), Max:98.1 °F (36.7 °C)       Intake/Output:   Last shift:      701 -  1900  In: -   Out: 75 [Urine:75]  Last 3 shifts:  190 -  0700  In: 1455.9 [P.O.:300; I.V.:1155.9]  Out: 2920 [Urine:1710]    Intake/Output Summary (Last 24 hours) at 2025 0846  Last data filed at 2025 0800  Gross per 24 hour   Intake 864.68 ml   Output 1315 ml   Net -450.32 ml       Physical Exam:    General:  Not in acute distress  HEENT:  Anicteric sclerae; pink palpebral conjunctivae  Resp:  Bilateral air entry +, no crackles or wheeze  CV:  S1, S2 present  GI:  Abdomen soft, non-tender; (+) active bowel sounds  Extremities:  No Cyanosis  Skin:  Warm; no rashes/ lesions noted  Neurologic:  Alert, awake and oriented.    DATA:     Current Facility-Administered Medications   Medication Dose Route Frequency    gabapentin (NEURONTIN) capsule 100 mg  100 mg Oral TID    0.9 % sodium chloride infusion   IntraVENous PRN    heparin (porcine) 
  ICU Progress Note        Subjective:      - Sitting comfortably on the bed. On levophed 20 mcg/min   - Sitting comfortably on the bed, levophed down to 6 mcg/min. On fixed dose vasopressin as well.    - Down to levophed 1 mcg/min.   : Reports feeling better, off pressors.     HPI  Alexandr Lewis is 84 y.o. male with h/o SBO, inguinal hernia status post mesh repair , CKD stage III, hypertension, hyperlipidemia.Patient presented to the emergency department 25 with complaints of acute onset chest pressure/burning. 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  Postoperatively admitted to CVICU.  Critical care consult is requested for comanagement.  Patient is currently intubated, sedation is being weaned off.  I obtained all the history from chart review.    Vital Signs:    BP (!) 167/72   Pulse 85   Temp 97.6 °F (36.4 °C) (Oral)   Resp 23   Ht 1.651 m (5' 5\")   Wt 77.5 kg (170 lb 13.7 oz)   SpO2 98%   BMI 28.43 kg/m²             Temp (24hrs), Av.1 °F (36.2 °C), Min:94.5 °F (34.7 °C), Max:98.4 °F (36.9 °C)       Intake/Output:   Last shift:       07 - 0  In: 300 [P.O.:300]  Out: -   Last 3 shifts:  190 -  0700  In: 1980.9 [P.O.:1200; I.V.:365.9]  Out: 2930 [Urine:2930]    Intake/Output Summary (Last 24 hours) at 2025 1059  Last data filed at 2025 1001  Gross per 24 hour   Intake 1578.21 ml   Output 2075 ml   Net -496.79 ml       Physical Exam:    General:  Not in acute distress  HEENT:  Anicteric sclerae; pink palpebral conjunctivae  Resp:  Bilateral air entry +, no crackles or wheeze  CV:  S1, S2 present  GI:  Abdomen soft, non-tender; (+) active bowel sounds  Extremities:  No Cyanosis  Skin:  Warm; no rashes/ lesions noted  Neurologic:  Alert, awake and oriented.    DATA:     Current Facility-Administered Medications   Medication Dose Route Frequency    clopidogrel (PLAVIX) tablet 75 mg  75 mg Oral Daily    potassium phosphate 
0700 Bedside and Verbal shift change report given to Lily RN (oncoming nurse) by Xuan RN (offgoing nurse). Report included the following information Nurse Handoff Report, Index, Adult Overview, Surgery Report, Intake/Output, MAR, Recent Results, and Cardiac Rhythm NSR .    0748 Shift assessment completed - see flowsheets   0946 AV pacer wires removed by Andrea NP  1155 Reassessment completed - see flowsheets   1430 IV's removed per protocol prior to d/c     
0700:  Bedside report received from NATHAN Houston.     End of Shift Note    Bedside shift change report given to NATHAN Houston (oncoming nurse) by Taj Bolden RN (offgoing nurse).  Report included the following information SBAR    Shift worked:  7a 7p     Shift summary and any significant changes:     NA     Concerns for physician to address:  NA       Activity:  Level of Assistance: Minimal assist, patient does 75% or more  Number times ambulated in hallways past shift: 2  Number of times OOB to chair past shift: 2    Neurologic:  Level of Consciousness: Alert (0)  Orientation Level: Oriented X4  Cognition: Follows commands, Appropriate safety awareness  Speech: Clear    Cardiac:   Cardiac Rhythm: Sinus rhythm    Pacer Wires: Pacer Wires: V-wires           Pacer wires Capped?: YES      Pacer Wire Care Completed: YES    Respiratory:   O2 Device: None (Room air)  ISS Teaching YES   Use : YES     Volume: 750    Genitourinary:   Urinary Status: Voiding    GI:  Last BM (including prior to admit): 02/24/25  Current diet:  ADULT ORAL NUTRITION SUPPLEMENT; Breakfast, Dinner; Low Calorie/High Protein Oral Supplement  ADULT DIET; Dysphagia - Minced and Moist; 4 carb choices (60 gm/meal); Low Fat/Low Chol/High Fiber/TINY; Low Potassium (Less than 3000 mg/day); No Concentrated sweets  Passing flatus: YES    Lines/drains/airway:  Peripheral IV    Peripheral IV x 2: YES    Skin:  Rudy Scale Score: 20  Interventions: Wound Offloading (Prevention Methods): Chair cushion, Repositioning, Pillows, Turning    Incision Care Completed This Shift: YES    CHG Bath Completed This Shift: NO    Pain Management:   Excellent - Able to sleep and participate with therapy     Patient Safety:  Fall Risk: Nursing Judgement-Fall Risk High(Add Comments): Yes  Fall Risk Interventions  Nursing Judgement-Fall Risk High(Add Comments): Yes  Toilet Every 2 Hours-In Advance of Need: Yes  Hourly Visual Checks: In bed, Awake  Fall Visual Posted: Fall sign 
0730  Bedside and verbal report from Evens Aldana RN    1400  Right IJ MAC removed by Jason Sanon RN    1600  Reassessment completed. Patient has been assisted to bathroom several times this afternoon. Passed flatus, attempted BM without success. Voided in toilet x 2-3 this afternoon. Assisted to recliner chair at the bedside.     1800  Took diet well. Remains up in the chair.     1900  Assisted to bathroom, patient voided, passed flatus. Assisted back to bed.     1945  Bedside and verbal report given to Shanna Pittman RN  
0845 pacer turned on at atrial rate of 70 to help with BP.   1000 pacer increased to 76 due to poor capture.   Seems better at higher rate.   1100 lantus insulin given.    1330 insullin drip trnsitioned off.    1345 ambulated to toilet. Attempted unsuccessfully to have Bm.  Ambulated entire length of hallway using rolling walker without incidence.  Returned to bed.   Continues on levo at 5 and vasoprssin at .03.     1725  complete chg bath given. Gown changed.   Ambulated p to chair with one assist. Eating dinner.   Pacemaker not capturing appropriately. Sinus rythmn 58 underlying rate. Pacer placed on standby.   
0900  ambulated in hallway with PT/OT without incidence.  1000  levo decreased from 2 to 1,   1015 type and screen sent to lab.     1215  PRBC started.  Levo off.    1228  lasix given.     1254 slick catheter removed.     1330 up to bathroom with 1 assist. Patient states he had normal BM,    1540 PRBC completed.   1625 valdovinos removed without incidence.     1800  complete CHG bath given.  Ambulated up to chair. Eating dinner.   
1114 Transfer (from IVCU to CVICU) shift report given to  Callie EVANS and Joss EVANS (oncoming nurse) by Reema EVANS (transferring nurse). Report included the following information Nurse Handoff Report, Adult Overview, and Event Log.      1200 Patient arrived to CVICU. Ambulated to bathroom with cane and one assist. Verified heparin rate.    1215 Bilateral blood pressures completed. Height verified with patient. Bed weight recorded.     1500 Pt ambulated to bathroom with cane and one assist. Pt flushed UOP before able to measure. Educated on importance of accurate I/O's.    1700 Pt ambulated to bathroom with cane and one assist.    End of Shift Note    Bedside shift change report given to Mateo EVANS (oncoming nurse) by Joss Canela RN (offgoing nurse).  Report included the following information SBAR, Kardex, Procedure Summary, Intake/Output, MAR, and Cardiac Rhythm SB    Shift worked:  7a-7p     Shift summary and any significant changes:     N/A     Concerns for physician to address:  N/A       Activity:  Level of Assistance: Standby assist, set-up cues, supervision of patient - no hands on  Number times ambulated in hallways past shift: 3  Number of times OOB to chair past shift: 3    Cardiac:   Cardiac Rhythm: Sinus sosa    Pacer Wires: N/A    Access:  Current line(s): PIV   .cent  Genitourinary:   Urinary Status: Voiding, Bathroom privileges    Respiratory:   O2 Device: None (Room air)  Incentive spirometer at bedside: YES    GI:  Last BM (including prior to admit): 02/20/25  Current diet:  ADULT DIET; Regular; Low Fat/Low Chol/High Fiber/2 gm Na  Diet NPO Exceptions are: Sips of Clear Liquids  ADULT ORAL NUTRITION SUPPLEMENT; Breakfast, Lunch, Dinner; Low Calorie/High Protein Oral Supplement  ADULT ORAL NUTRITION SUPPLEMENT; Breakfast, Lunch, Dinner; Low Calorie/High Protein Oral Supplement  Passing flatus: YES    Pain Management:   Excellent - Able to sleep and participate with therapy     Skin:  Rudy Scale 
1600: Heparin gtt per pharmacy to be restarted at previous rate before cath. Heparin gtt started at 11units. Xa to be drawn at 10pm.   
1900: Bedside and Verbal shift change report given to NATHAN Galindo (oncoming nurse) by NATHAN Ledesma (offgoing nurse). Report included the following information Nurse Handoff Report, ED Encounter Summary, Adult Overview, Intake/Output, MAR, Recent Results, Med Rec Status, Cardiac Rhythm SB, and Alarm Parameters.     2000: Shift assessment completed and documented.     2200: CHG bath performed. 28 oz Gatorade provided to patient for carb loading #1    0000: Reassessment completed. No acute changes noted. Heparin gtt stopped per protocol.     0400: Reassessment completed. No acute changes noted.  CHG bath completed. 14 oz Gatorade given for carb loading # 2    0530: Verbal report given to NATHAN Thomason (OR holding)    End of Shift Note      Shift worked:  7p-7a     Shift summary and any significant changes:     See above     Concerns for physician to address:  N/A       Activity:  Level of Assistance: Standby assist, set-up cues, supervision of patient - no hands on  Number times ambulated in hallways past shift: 0  Number of times OOB to chair past shift: 0    Cardiac:   Cardiac Rhythm: Sinus sosa    Access:  Current line(s): PIV   .cent  Genitourinary:   Urinary Status: Voiding    Respiratory:   O2 Device: None (Room air)  Incentive spirometer at bedside: YES    GI:  Last BM (including prior to admit): 02/20/25  Current diet:  Diet NPO Exceptions are: Sips of Clear Liquids  Passing flatus: YES    Pain Management:   Excellent - Able to sleep and participate with therapy     Skin:  Rudy Scale Score: 22  Interventions: Wound Offloading (Prevention Methods): Bed, pressure redistribution/air, Bed, pressure reduction mattress, Pillows, Repositioning, Turning    Incision Care Completed This Shift: NO    CHG Bath Completed This Shift: YES    Patient Safety:  Fall Risk: Nursing Judgement-Fall Risk High(Add Comments): No  Fall Risk Interventions  Nursing Judgement-Fall Risk High(Add Comments): No  Toilet Every 2 Hours-In Advance 
1900: Bedside/verbal report received from Callie EVANS. SB/NSR with PAC's on CM. O2 2 LNC. Denies discomforts. Cy drip @ 70 mcg/min. Will titrate for SBP >90 and MAP> 65. First CASIMIRO WNL 0.20.  2002: PRN Albumin given. (4th one since surgery)  2012: Discussed patient's need for higher Cy doses, UO, CT output and VS with ICU NP. Will give 1 L LR.  2126: PRN Dilaudid 0.5 mg given for C/O incisional pain.  2130: Due to elevated Cy requirements and patient is currently receiving LR bolus, waited until 2135 to have patient's feet to floor. Stood up at bedside and assisted back to bed.  2200: Turned pacer on AAI with attempt to wean down Cy drip.  2356: Earlier Ical 1.13. Will give PRN calcium given.  2319: Discussed patient's need for higher Cy doses, UO, CT output and VS with ICU NP. Will give 1 L LR. Repeat chemistry obtained.  0000: Insulin remains on hold per MAR.  0017: Second CASIMIRO WNL 0.10.  0420: Discussed patient's need for higher Cy doses, UO, CT output and VS with ICU NP. No new orders at this time. I increased pacer rate to 86 from 80 to increase SBP. Cy @ 100 mcg/min and CVP 7-8.  0446: SBP 60's-70's [place] and cuff pressures (especially with any activity). Discussed patient's condition again with ICU NP. Received order to increase as high as 300 mcg/min if needed.  0500: Responding well to Cy @ 125 mcg/min. CT output serosanguinous.  5745-7228: Per ICU NP request, notify Cardiac Surgery and provide update. I spoke with Deon GOLDSTEIN, will switch to Levophed drip.  0652: Roxicodone 5 mg given for C/O incisional pain.  0715: Bedside/verbal report given to Miguel EVANS.  
Assisted patient to the bathroom; 1 person assist w/ cane. Patient was steady. Tolerated well.  
Cardiac Surgery Care Coordinator met with Alexandr Lewis and family. Introduced role of the Cardiac Surgery Care Coordinator.  Reviewed plan of care and began pre-op education.  Discussed day of surgery expectations for the pt and family.  Instructed pt on the proper use of the incentive spirometer. He is able to pull 2000ml.  Provided and reviewed material in the ERAS binder, including Cardiac Surgery Pathway. Reinforced sternal precautions and keeping your move in the tube. Encouraged Alexandr Lewis and his family to verbalize and offered emotional support.  Lilly Lewis RN    
Cardiac Surgery Care Coordinator-  Met with Alexandr Lewis. Reviewed plan of care and discharge instructions. Reinforced move in the tube sternal precautions and continued use of the incentive spirometer.  Alexandr Lewis is able to pull 1000cc.  Reviewed the importance of daily temp and weight monitoring, discussed incisional care and reviewed signs and symptoms of infection.  Red reminder bracelet on right wrist.  Reviewed purpose of the bracelet and when to call the MD. Using the teach back method reviewed  medications  Reminded pt of appts and encouraged participation in the Cardiac Wellness and rehab program after discharge.  Encouraged Alexandr Lewis to verbalize and emotional support given.  Alexandr Lewis is without questions or concerns at this time. Will follow up with a phone call after discharge. Lilly Lewis RN    
Cardiac Surgery Care Coordinator- Met with Alexandr Lewis.  Reviewed plan of care and discussed potential discharge date.  Reinforced sternal precautions and encouraged continued use of the incentive spirometer. Began discharge teaching and encouraged him to verbalize.  Reviewed goals for the day and discussed the  importance of increased activity and continued activity after discharge.  Called pt's niece, Alexandra and discussed potential discharge. Informed her that therapy is recommending PT/OT at home. Alexandra stated she will make arrangements for someone to be with him or to stay with his sister. Will continue to follow for educational and emotional needs. Lilly Lewis RN    
Cardiac Surgery Care Coordinator- Met with Alexandr Lewis.  Reviewed plan of care and discussed upcoming discharge date.  Reinforced sternal precautions and encouraged continued use of the incentive spirometer. Continued with discharge teaching and encouraged him to verbalize.  Reviewed goals for the day and discussed the  importance of increased activity and continued activity after discharge.  Will continue to follow for educational and emotional needs. Lilly Lewis RN    
Cardiac Surgery Coordinator met with the family of Alexandr Lewis, introduced role of the Cardiac Surgery Care Coordinator.  Reviewed plan of care and day of surgery expectations. Provided family with an update from OR.  Encouraged family to verbalize and emotional support given.  Will continue to update throughout the day.    0957 - Met with family of Alexandr Lewis, provided family with update of rewarming.  Family without questions or concerns at this time.  Will continue to follow for educational and emotional needs.     
Cardiac Surgery End of Shift Report     Bedside and Verbal shift change report given to  Sally (oncoming nurse) by Evens Aldana RN (offgoing nurse). Report included the following information Nurse Handoff Report, Index, ED Encounter Summary, Surgery Report, Intake/Output, Med Rec Status, and Cardiac Rhythm SB-SR .       Vitals:    BP BP Readings from Last 1 Encounters:   02/24/25 (!) 99/53      Temp Temp Readings from Last 1 Encounters:   02/24/25 97.5 °F (36.4 °C) (Oral)      Pulse Pulse Readings from Last 1 Encounters:   02/24/25 59      Resp Resp Readings from Last 1 Encounters:   02/24/25 18      SpO2 SpO2: 100 %    O2 Flow Rate (L/min): 2 L/min         EKG/Rhythm:   SB-SR                  External Pacemaker:  no              Pacer wires Capped?: Yes, for V wire, and A at standby    Oxygen therapy:   Oxygen Delivery:   2 liters/min via nasal cannula    ISS Teaching Yes   Use : Yes  Volume: 1250      Lines & Drains:  Central Venous Catheter:   and Peripheral Intravenous Line:   Peripheral IV Right;Dorsal Cephalic (Active)   Site Assessment Clean, dry & intact 02/24/25 0500   Line Status Flushed;No blood return;Normal saline locked;Capped 02/24/25 0500   Line Care Connections checked and tightened;Ports disinfected 02/24/25 0500   Phlebitis Assessment No symptoms 02/24/25 0500   Infiltration Assessment 0 02/24/25 0500   Alcohol Cap Used Yes 02/24/25 0500   Dressing Status Clean, dry & intact 02/24/25 0500   Dressing Type Transparent 02/24/25 0500       Peripheral IV 02/14/25 Left Cephalic (Active)   Site Assessment Clean, dry & intact 02/24/25 0500   Line Status Flushed;No blood return;Normal saline locked;Capped 02/24/25 0500   Line Care Connections checked and tightened;Ports disinfected 02/24/25 0500   Phlebitis Assessment No symptoms 02/24/25 0500   Infiltration Assessment 0 02/24/25 0500   Alcohol Cap Used Yes 02/24/25 0500   Dressing Status Clean, dry & intact 02/24/25 0500   Dressing Type 
Cardiac Surgery ICU Progress Note    Admit Date: 2025  POD:  1 Day Post-Op    Procedure:  Procedure(s):  OFF PUMP CORONARY ARTERY BYPASS GRAFT (CABG) x3 WITH ENDOSCOPIC VEIN HARVESTING (EVH), LEFT ATRIAL APPENDAGE LIGATION WITH CLIP, WITH CARDIOPLEGIA (NO PAC)(E.R.A.S.) INTRAOPERATIVE FARIDA DONE BY AMBER MORGAN. EVH DONE BY AMBER GOLDSTEIN.        Subjective:   Patient seen with Dr. Nieves, pain is controlled, he is on 2L NC, SBP and map low, last 12 hours, 4 albumin and 2L of LR given, CVP 8, now on Levophed @ 20. HR 80 SR. No complaints.     Objective:   Vitals:  Blood pressure 122/63, pulse 80, temperature 98.1 °F (36.7 °C), resp. rate 17, height 1.651 m (5' 5\"), weight 73.1 kg (161 lb 2.5 oz), SpO2 98%.  Temp (24hrs), Av.1 °F (36.2 °C), Min:93.2 °F (34 °C), Max:98.1 °F (36.7 °C)      EKG/Rhythm:  SR 80s    Extubation Date / Time:  1700    CT Output: 660cc/24    CXR:    Admission Weight: Last Weight   Weight - Scale: 71 kg (156 lb 8.4 oz) Weight - Scale: 73.1 kg (161 lb 2.5 oz)     Intake / Output / Drain:  Current Shift:  0701 -  1900  In: 383.3 [I.V.:117.9]  Out: 325 [Urine:205]  Last 24 hrs.:   Intake/Output Summary (Last 24 hours) at 2025 1033  Last data filed at 2025 1019  Gross per 24 hour   Intake 6263.79 ml   Output 2310 ml   Net 3953.79 ml       EXAM:  General:  No acute distress             Lungs:    Decreased BS at the bases.   Incision:  No erythema, drainage or swelling.   Heart:  Regular rate and rhythm, S1, S2 normal, no murmur, click, rub or gallop.   Abdomen:   Soft, non-tender. Bowel sounds normal. No masses,  No organomegaly.   Extremities:  No edema. PPP.    Neurologic:  Gross motor and sensory apparatus intact.     Labs:   Recent Labs     25  1306 25  1726 25  0330   WBC 14.6*   < > 14.0*   HGB 10.2*   < > 8.3*   HCT 31.9*   < > 26.4*      < > 148*      < > 141   K 3.5   < > 4.9   BUN 29*   < > 29*   INR 1.2*  --   --     < > = 
Cardiac Surgery ICU Progress Note    Admit Date: 2025  POD:  2 Days Post-Op    Procedure:  Procedure(s):  OFF PUMP CORONARY ARTERY BYPASS GRAFT (CABG) x3 WITH ENDOSCOPIC VEIN HARVESTING (EVH), LEFT ATRIAL APPENDAGE LIGATION WITH CLIP, WITH CARDIOPLEGIA (NO PAC)(E.R.A.S.) INTRAOPERATIVE FARIDA DONE BY AMBER MORGAN. EVH DONE BY AMBER GOLDSTEIN.        Subjective:   Patient seen with Dr. Nieves, pain is controlled, he is on RA, now on Levophed @ 6. Vaso  HR 60 SR. No complaints.     Objective:   Vitals:  Blood pressure 111/69, pulse 61, temperature 97.6 °F (36.4 °C), temperature source Oral, resp. rate 14, height 1.651 m (5' 5\"), weight 73.7 kg (162 lb 7.7 oz), SpO2 92%.  Temp (24hrs), Av.1 °F (36.7 °C), Min:97.6 °F (36.4 °C), Max:98.3 °F (36.8 °C)      EKG/Rhythm:  SR 60-80s    Extubation Date / Time:  1700    CT Output: 140cc/24, no air leak, CT removed without complication.     CXR:  Xray Result (most recent):  XR CHEST PORTABLE 2025    Narrative  INDICATION: Post op open heart surgery    COMPARISON: 2025    FINDINGS: Single AP portable view of the chest demonstrates unchanged position  of right IJ line and pleural/mediastinal drains. Suspect a small right apical  pneumothorax previously obscured by skinfold though possibly unchanged. The  cardiomediastinal silhouette is unchanged. No pulmonary edema.    Impression  Suspect small right apical pneumothorax previously obscured by skinfold.  Attention on short-term follow-up radiograph recommended. Unchanged life-support  lines and tubes.    The findings were called to ICU nurse Leola on 2025 at 0641 by myself.    789    Electronically signed by Pierce Coates       Admission Weight: Last Weight   Weight - Scale: 71 kg (156 lb 8.4 oz) Weight - Scale: 73.7 kg (162 lb 7.7 oz)     Intake / Output / Drain:  Current Shift: No intake/output data recorded.  Last 24 hrs.:   Intake/Output Summary (Last 24 hours) at 2025 1020  Last data filed at 
Cardiac Surgery ICU Progress Note    Admit Date: 2025  POD:  3 Days Post-Op    Procedure:  Procedure(s):  OFF PUMP CORONARY ARTERY BYPASS GRAFT (CABG) x3 WITH ENDOSCOPIC VEIN HARVESTING (EVH), LEFT ATRIAL APPENDAGE LIGATION WITH CLIP, WITH CARDIOPLEGIA (NO PAC)(E.R.A.S.) INTRAOPERATIVE FARIDA DONE BY AMBER MORGAN. EVH DONE BY AMBER GOLDSTEIN.        Subjective:   Patient seen with Dr. Lucia. Pt up in chair. Feeling fair, denies present complaints.     On 2L NC. Afebrile.  On Levo @ 1 and Vaso at 0.03.      Objective:   Vitals:  Blood pressure (!) 101/53, pulse 55, temperature 98.1 °F (36.7 °C), temperature source Oral, resp. rate 18, height 1.651 m (5' 5\"), weight 78.6 kg (173 lb 4.5 oz), SpO2 99%.  Temp (24hrs), Av.7 °F (36.5 °C), Min:97.2 °F (36.2 °C), Max:98.1 °F (36.7 °C)    EKG/Rhythm:  SR 50-60s    CXR:  Xray Result (most recent):  XR CHEST PORTABLE 2025    Narrative  EXAM: XR CHEST PORTABLE  ACC#: [id number]    INDICATION: Post op open heart surgery    COMPARISON: 2025    TECHNIQUE: AP portable semierect view of the chest.    FINDINGS:  Right-sided central venous catheter is again visualized.  No no pneumothorax. Tiny right-sided pleural effusion is suspected.  The cardiomediastinal configuration is within normal limits.  No acute bony abnormalities.    Impression  No pneumothorax. Tiny right-sided pleural effusion.      Electronically signed by KAMRAN Perez       Admission Weight: Last Weight   Weight - Scale: 71 kg (156 lb 8.4 oz) Weight - Scale: 78.6 kg (173 lb 4.5 oz)     Intake / Output / Drain:  Current Shift:  0701 -  1900  In: -   Out: 75 [Urine:75]  Last 24 hrs.:   Intake/Output Summary (Last 24 hours) at 2025 0900  Last data filed at 2025 0800  Gross per 24 hour   Intake 864.68 ml   Output 1315 ml   Net -450.32 ml       EXAM:  General:  No acute distress             Lungs:    Decreased BS at the bases.   Incision:  No erythema, drainage or swelling.   Heart:  
Cardiac Surgery Progress Note    Admit Date: 2025  POD:  4 Days Post-Op    Procedure:  Procedure(s):  OFF PUMP CORONARY ARTERY BYPASS GRAFT (CABG) x3 WITH ENDOSCOPIC VEIN HARVESTING (EVH), LEFT ATRIAL APPENDAGE LIGATION WITH CLIP, WITH CARDIOPLEGIA (NO PAC)(E.R.A.S.) INTRAOPERATIVE FARIDA DONE BY AMBER MORGAN. EVH DONE BY AMBER GOLDSTEIN.        Subjective:   Patient seen with Dr. Lucia. Pt up in chair. Feeling fair, denies present complaints. Walking great down the [place]. Minimal pain. Eating and moving his bowels.     On RA. Afebrile.  Off all gtts.      Objective:   Vitals:  Blood pressure (!) 167/72, pulse 85, temperature 97.6 °F (36.4 °C), temperature source Oral, resp. rate 23, height 1.651 m (5' 5\"), weight 77.5 kg (170 lb 13.7 oz), SpO2 98%.  Temp (24hrs), Av.1 °F (36.2 °C), Min:94.5 °F (34.7 °C), Max:98.4 °F (36.9 °C)    EKG/Rhythm:  SR 60-70s    CXR:  Xray Result (most recent):  XR CHEST PORTABLE 2025    Narrative  EXAM: XR CHEST PORTABLE  ACC#: [id number]    INDICATION: Post op open heart surgery    COMPARISON: 2025    TECHNIQUE: AP portable semierect view of the chest.    FINDINGS:  Right-sided central venous catheter is again visualized.  No no pneumothorax. Tiny right-sided pleural effusion is suspected.  The cardiomediastinal configuration is within normal limits.  No acute bony abnormalities.    Impression  No pneumothorax. Tiny right-sided pleural effusion.      Electronically signed by KAMRAN Perez       Admission Weight: Last Weight   Weight - Scale: 71 kg (156 lb 8.4 oz) Weight - Scale: 77.5 kg (170 lb 13.7 oz)     Intake / Output / Drain:  Current Shift: No intake/output data recorded.  Last 24 hrs.:   Intake/Output Summary (Last 24 hours) at 2025 0903  Last data filed at 2025 0411  Gross per 24 hour   Intake 1278.21 ml   Output 2075 ml   Net -796.79 ml       EXAM:  General:  No acute distress             Lungs:    Coarse bases, clear uppers.    Incision:  No erythema, 
Cardiac Surgery Progress Note    Admit Date: 2025  POD:  5 Days Post-Op    Procedure:  Procedure(s):  OFF PUMP CORONARY ARTERY BYPASS GRAFT (CABG) x3 WITH ENDOSCOPIC VEIN HARVESTING (EVH), LEFT ATRIAL APPENDAGE LIGATION WITH CLIP, WITH CARDIOPLEGIA (NO PAC)(E.R.A.S.) INTRAOPERATIVE FARIDA DONE BY AMBER MORGAN. EVH DONE BY AMBER GOLDSTEIN.        Subjective:   Patient seen with Dr. Lucia. Pt up in chair. . Afebrile, on RA. No complaints.      Objective:   Vitals:  Blood pressure 121/69, pulse 92, temperature 98.5 °F (36.9 °C), temperature source Oral, resp. rate 17, height 1.651 m (5' 5\"), weight 77.3 kg (170 lb 6.7 oz), SpO2 99%.  Temp (24hrs), Av.3 °F (36.8 °C), Min:98 °F (36.7 °C), Max:98.5 °F (36.9 °C)    EKG/Rhythm:      CXR:  Xray Result (most recent):  XR CHEST PORTABLE 2025    Narrative  EXAM: XR CHEST PORTABLE  ACC#: [id number]    INDICATION: Post op open heart surgery    COMPARISON: 2025    TECHNIQUE: AP portable upright view of the chest.    FINDINGS:    There is mild vascular congestion that is increased from the prior study.  The cardiomediastinal configuration is within normal limits.  No acute bony abnormalities.    Impression  Mild increase in pulmonary edema.      Electronically signed by KAMRAN Perez       Admission Weight: Last Weight   Weight - Scale: 71 kg (156 lb 8.4 oz) Weight - Scale: 77.3 kg (170 lb 6.7 oz)     Intake / Output / Drain:  Current Shift: No intake/output data recorded.  Last 24 hrs.:   Intake/Output Summary (Last 24 hours) at 2025 0925  Last data filed at 2025 0639  Gross per 24 hour   Intake 900 ml   Output 700 ml   Net 200 ml       EXAM:  General:  No acute distress             Lungs:    Coarse bases, clear uppers.    Incision:  No erythema, drainage or swelling.   Heart:  Regular rate and rhythm, S1, S2 normal, no murmur, click, or gallop. + rub    Abdomen:   Soft, non-tender. Bowel sounds normal. No masses,  No organomegaly. 
Cardiology Progress Note      2/15/2025 2:52 PM    Admit Date: 2/13/2025          Subjective: Ambulating in room. No chest pain or other new c/o          BP (!) 109/51   Pulse 61   Temp 97.9 °F (36.6 °C) (Oral)   Resp 17   Ht 1.727 m (5' 8\")   Wt 71 kg (156 lb 8.4 oz)   SpO2 99%   BMI 23.80 kg/m²   02/13 1901 - 02/15 0700  In: 480 [P.O.:480]  Out: -         Objective:      Physical Exam:  VS as above  Chest CTA  Card RRR no MRG     Data Review:   Labs:    Hgb 9.3  BUN 26  Creat 1.6     Telemetry: SR R 60-70       Assessment:       - Chest pain  - NSTEMI Stress 7/2024 no ischemia, EF 54%  - Echo 7/2024 nl EF  - sinus with anterior TWI  - HTN  - Dyslipidemia  - CKD with cr cl of 35, cr of 1.8  - s/p hernia repair, SBO/ileus, ?mesenteric ischmia  - Quit tobacco in past can not tell me when  - No etoh, no drugs   in 2021, 3 kids, retired , uses a cane/walker       Plan:  Cont current Rx. For cath Monday AM              
Cardiology Progress Note      2/16/2025 11:31 AM    Admit Date: 2/13/2025          Subjective:  Mild chest pain, nothing severe. None at present. No other c/o          /62   Pulse 65   Temp 98 °F (36.7 °C) (Oral)   Resp 18   Ht 1.727 m (5' 8\")   Wt 71 kg (156 lb 8.4 oz)   SpO2 100%   BMI 23.80 kg/m²   02/14 1901 - 02/16 0700  In: 200 [P.O.:200]  Out: -         Objective:      Physical Exam:  VS as above  Chest scttered rhonchi  Card RRR no MRG     Data Review:   Labs:    Hgb 8.8     Telemetry: SR R 70       Assessment:       - Chest pain  - NSTEMI Stress 7/2024 no ischemia, EF 54%  - Echo 7/2024 nl EF  - sinus with anterior TWI  - HTN  - Dyslipidemia  - CKD with cr cl of 35, cr of 1.8  - s/p hernia repair, SBO/ileus, ?mesenteric ischmia  Anemia   - Quit tobacco in past can not tell me when  - No etoh, no drugs   in 2021, 3 kids, retired , uses a cane/walker       Plan: For [facility] tomm NPO p MN. Getting IVF              
Comprehensive Nutrition Assessment    Type and Reason for Visit:  Initial, LOS    Nutrition Recommendations/Plan:   Continue current diet and supplements. Added potassium restriction d/t hyperkalemia.   Please document % meals and supplements consumed in flowsheet I/O's under intake   Added [facility] healthy diet information to discharge instructions per pt request     Malnutrition Assessment:  Malnutrition Status:  No malnutrition (02/24/25 1256)    Context:  Acute Illness     Findings of the 6 clinical characteristics of malnutrition:  Energy Intake:  No decrease in energy intake  Weight Loss:  No weight loss     Body Fat Loss:  No body fat loss     Muscle Mass Loss:  No muscle mass loss    Fluid Accumulation:  No fluid accumulation     Strength:  Not Performed    Nutrition Assessment:     Chart reviewed for LOS. Pt is s.p CABG x3 with hypotension and anemia. PMHx includes CKD3, HLD, HTN, prostate cancer, SBO. Pt reports a good appetite PTA and throughout admission. He is consuming all of the supplements as ordered. Pt requested diet information to be left for family to review. I have added information to his discharge instructions. No other concerns at this time.     Patient Vitals for the past 120 hrs:   PO Meals Eaten (%)   02/20/25 1800 51 - 75%   02/20/25 1300 51 - 75%     Patient Vitals for the past 120 hrs:   PO Supplement (%)   02/23/25 1538 76 - 100%     Wt Readings from Last 5 Encounters:   02/24/25 78.6 kg (173 lb 4.5 oz)   02/05/25 71.4 kg (157 lb 6.5 oz)   08/08/24 73.3 kg (161 lb 9.6 oz)   07/29/24 67.4 kg (148 lb 9.4 oz)   07/23/24 67.8 kg (149 lb 7.6 oz)   ]    Nutrition Related Findings:    Labs: K 5.2, Cr 2.13, BUN 49, Hgb 6.8.   Meds: Vitamin C, Lipitor, Aspirin, Pepcid, Iron, Humalog, Glycolax, Senokot, NS, Vasopressin.   Edema: +2 pitting BLE.   BM 2/23.   Wound Type: Surgical Incision       Current Nutrition Intake & Therapies:    Average Meal Intake: 51-75%, %  Average Supplements 
Due to emergencies will postpone cath until Monday AM  Needs to be NPO p MN Sunday    Transfer to IVCU  
[facility] FLOOR (Pre-op) Progress Note    Admit Date: 2025  POD: 2 Days Post-Op      Procedure:  Procedure(s):  Left heart cath / coronary angiography  Ultrasound guided vascular access      Subjective/overnight events:   Pt discussed with Dr. Lucia, seen in bed. In SR-SB, on room air, afebrile. Vital signs stable. Some CP earlier this AM- resolved now. No complaints at present.    Objective:     /61   Pulse 52   Temp 98.3 °F (36.8 °C) (Oral)   Resp 15   Ht 1.727 m (5' 8\")   Wt 70.7 kg (155 lb 13.8 oz)   SpO2 100%   BMI 23.70 kg/m²   Temp (24hrs), Av.8 °F (36.6 °C), Min:97.4 °F (36.3 °C), Max:98.3 °F (36.8 °C)      Last 24hr Input/Output:    Intake/Output Summary (Last 24 hours) at 2025 0853  Last data filed at 2025 1008  Gross per 24 hour   Intake --   Output 325 ml   Net -325 ml        Pre-op Workup    Carotid duplex,25 :     Mild (<50%) stenosis in the right internal carotid artery.  Mild and heterogeneous plaque in the right internal carotid artery.    Moderate (50-69%) stenosis in the left internal carotid artery.  Moderate and heterogeneous plaque in the left internal carotid artery.    Normal antegrade flow involving the right vertebral artery.    Normal antegrade flow involving the left vertebral artery.    Cardiac cath: 25    CARDIAC PROCEDURE 2025  9:11 AM (Final)    Conclusion  Cardiac Cathetherization Note    PreOp Diagnosis:  []       Chest Pain  []       STEMI  []       Unstable Angina  [x]       NSTEMI  []       Cardiomyopathy/Heart Failure  []       Abnormal Stress Test  []       Valve Disease  []       Pre-Operative Evaluation  []       Other:    Findings/PostOp Diagnosis:  1. Heavily calcified 3v CAD  2. Normal LVEDP    Recommendations:  1. CTS consult  2. Routine post procedure & access site care  3. Continue aggressive medical management and risk factor modification    I have explained the nature of cardiac catheterization and possible percutaneous 
End of Shift Note    Bedside shift change report given to  Renae(oncoming nurse) by Evens Aldana RN (offgoing nurse).  Report included the following information SBAR, Kardex, OR Summary, Procedure Summary, Intake/Output, MAR, Recent Results, and Cardiac Rhythm SR    Shift worked:  Night     Shift summary and any significant changes:     Walked in hallway without complication, slept well, Lab better     Concerns for physician to address:  N/A       Activity:  Level of Assistance: Minimal assist, patient does 75% or more  Number times ambulated in hallways past shift: 1  Number of times OOB to chair past shift: 2    Cardiac:   Cardiac Rhythm: Sinus rhythm    Pacer Wires: Pacer Wires: A&V-wires           Pacer wires Capped?: YES      Pacer Wire Care Completed: YES    Access:  Current line(s): PIV  and CVC          Genitourinary:   Urinary Status: Voiding    Respiratory:   O2 Device: None (Room air)  ISS Teaching YES   Use : YES     Volume: 1250    GI:  Last BM (including prior to admit): 02/24/25  Current diet:  ADULT ORAL NUTRITION SUPPLEMENT; Breakfast, Dinner; Low Calorie/High Protein Oral Supplement  ADULT DIET; Dysphagia - Minced and Moist; 4 carb choices (60 gm/meal); Low Fat/Low Chol/High Fiber/TINY; Low Potassium (Less than 3000 mg/day); No Concentrated sweets  Passing flatus: NO    Pain Management:   Excellent - Able to sleep and participate with therapy     Skin:  Rudy Scale Score: 18  Interventions: Wound Offloading (Prevention Methods): Bed, pressure redistribution/air, Walker, Repositioning    Incision Care Completed This Shift: YES    CHG Bath Completed This Shift: NO    Patient Safety:  Fall Risk: Nursing Judgement-Fall Risk High(Add Comments): No  Fall Risk Interventions  Nursing Judgement-Fall Risk High(Add Comments): No  Toilet Every 2 Hours-In Advance of Need: Yes  Hourly Visual Checks: In bed, Awake, Quiet  Fall Visual Posted: Socks, Fall sign posted  Room Door Open: Yes  Alarm On: 
End of Shift Note    Bedside shift change report given to Ai EVANS (oncoming nurse) by Willow Layne RN (offgoing nurse).  Report included the following information SBAR, Intake/Output, MAR, Recent Results, Med Rec Status, and Cardiac Rhythm SB-SR    Shift worked:  6612-3838     Shift summary and any significant changes:    Remains on Heparin gtt. Labs drawn this AM, anti Xa remains therapeutic. Ambulated to bathroom with 1 assist, no c/o pain or distress. CABG on Friday per CTS.     Concerns for physician to address:       Zone phone for oncoming shift:          Activity:  Level of Assistance: Independent  Number times ambulated in hallways past shift: 0  Number of times OOB to chair past shift: 3    Cardiac:   Cardiac Monitoring: Yes      Cardiac Rhythm: Sinus sosa, Sinus rhythm    Access:  Current line(s): PIV     Genitourinary:   Urinary Status: Voiding, Bathroom privileges    Respiratory:   O2 Device: None (Room air)  Chronic home O2 use?: NO  Incentive spirometer at bedside: N/A    GI:  Last BM (including prior to admit): 02/20/25  Current diet:  ADULT DIET; Regular; Low Fat/Low Chol/High Fiber/2 gm Na  ADULT ORAL NUTRITION SUPPLEMENT; Breakfast, Lunch, Dinner; Low Calorie/High Protein Oral Supplement  Passing flatus: YES    Pain Management:   Patient states pain is manageable on current regimen: YES    Skin:  Rudy Scale Score: 19  Interventions: Wound Offloading (Prevention Methods): Turning, Pillows    Patient Safety:  Fall Risk: Nursing Judgement-Fall Risk High(Add Comments): Yes  Fall Risk Interventions  Nursing Judgement-Fall Risk High(Add Comments): Yes  Toilet Every 2 Hours-In Advance of Need: Yes  Hourly Visual Checks: Eyes closed, In bed  Fall Visual Posted: Socks, Fall sign posted  Room Door Open: Deferred to promote rest  Alarm On: Bed  Patient Moved Closer to Nursing Station: No    Active Consults:   IP CONSULT TO CARDIOLOGY  IP CONSULT TO CARDIAC REHAB  IP CONSULT TO GENERAL SURGERY  IP 
End of Shift Note    Bedside shift change report given to NATHAN Alvares (oncoming nurse) by Tammy Verde RN (offgoing nurse).  Report included the following information SBAR    Shift worked:  7a-7p     Shift summary and any significant changes:     Mr. Lewis is alert and oriented. He is up with standby assist with his cane. He has no complaints of pain or discomfort. He has been up in the chair. He ambulated to the toilet multiple times for a BM but only passed gas today. He is on room air, NSR on the monitor.      Concerns for physician to address:  N/A       Activity:  Level of Assistance: Minimal assist, patient does 75% or more  Number times ambulated in hallways past shift: 0  Number of times OOB to chair past shift: 3    Cardiac:   Cardiac Rhythm: Sinus rhythm    Pacer Wires: Pacer Wires: Removed                    Access:    Peripheral Intravenous Line:  Peripheral IV 02/20/25 Right;Dorsal Cephalic (Active)   Site Assessment Clean, dry & intact 02/25/25 1600   Line Status Capped 02/25/25 1600   Line Care Connections checked and tightened 02/25/25 1600   Phlebitis Assessment No symptoms 02/25/25 1600   Infiltration Assessment 0 02/25/25 1600   Alcohol Cap Used Yes 02/25/25 1600   Dressing Status Clean, dry & intact 02/25/25 1600   Dressing Type Transparent 02/25/25 1600       Peripheral IV 02/22/25 Left;Anterior Forearm (Active)   Site Assessment Clean, dry & intact 02/25/25 1600   Line Status Capped 02/25/25 1600   Line Care Connections checked and tightened 02/25/25 1600   Phlebitis Assessment No symptoms 02/25/25 1600   Infiltration Assessment 0 02/25/25 1600   Alcohol Cap Used Yes 02/25/25 1600   Dressing Status Clean, dry & intact 02/25/25 1600   Dressing Type Transparent 02/25/25 1600       Peripheral IV 02/24/25 Right Forearm (Active)   Site Assessment Clean, dry & intact 02/25/25 1600   Line Status Capped 02/25/25 1600   Line Care Connections checked and tightened 02/25/25 1600   Phlebitis Assessment No 
End of Shift Note    Bedside shift change report given to RN (oncoming nurse) by Anita Walden RN (offgoing nurse).  Report included the following information SBAR    Shift worked:  7am - 7pm      Shift summary and any significant changes:     Admitted to [facility] this shift on Heparin Drip. AntiXa drawn at 1500, therapeutic at current dose. Patient had an episode of tachycardia up to 155 sustained for 10 minutes, 12 EKG completed at bedside and Dr. Cordero notified, ordered IV Metoprolol. However, HR returned to baseline in 70's prior to any medication administration, no meds administered. Patient BP low this evening, nitro held per Dr. Cordero verbal order. Patient is scheduled to have cath on Monday. Next AntiXa due at 8pm.      Concerns for physician to address:  N/A   Zone phone for oncoming shift:   N/A     Activity:     Number times ambulated in hallways past shift: 0  Number of times OOB to chair past shift: 0    Cardiac:   Cardiac Monitoring: Yes      Cardiac Rhythm: Sinus rhythm with PVC    Access:  Current line(s): PIV     Genitourinary:   Urinary Status: Voiding    Respiratory:   O2 Device: None (Room air)  Chronic home O2 use?: NO  Incentive spirometer at bedside: NO    GI:     Current diet:  ADULT DIET; Regular  Passing flatus: YES    Pain Management:   Patient states pain is manageable on current regimen: YES    Skin:  Rudy Scale Score: 19  Interventions:      Patient Safety:  Fall Risk:    Fall Risk Interventions  Toilet Every 2 Hours-In Advance of Need: Yes  Hourly Visual Checks: Awake, In bed, Eyes closed  Fall Visual Posted: Socks, Fall sign posted, Armband  Room Door Open: Yes  Alarm On: Bed  Patient Moved Closer to Nursing Station: No    Active Consults:   IP CONSULT TO CARDIOLOGY  IP CONSULT TO CARDIAC REHAB  IP CONSULT TO GENERAL SURGERY    Length of Stay:  Expected LOS: 2  Actual LOS: 0    Anita Walden, RN                            
End of Shift Note    Bedside shift change report given to RN (oncoming nurse) by Giovana Story RN (offgoing nurse).  Report included the following information Nurse Handoff Report    Shift worked:  AM     Shift summary and any significant changes:     No change. Heparin gtt therapeutic. CABG Friday per Dr Lucia. He will speak with family tomorrow.      Concerns for physician to address:  Family would like to be informed on surgery decision.      Zone phone for oncoming shift:   [phone]       Activity:  Activity: In bed  Number times ambulated in hallways past shift: 1  Number of times OOB to chair past shift: 4    Cardiac:   Cardiac Monitoring: Yes      Cardiac Rhythm: Sinus sosa, Sinus rhythm    Access:  Current line(s): PIV     Genitourinary:   Urinary status: voiding    Respiratory:   O2 Device: None (Room air)  Chronic home O2 use?: NO  Incentive spirometer at bedside: YES       GI:  Last BM (including prior to admit): 02/18/25  Current diet:    ADULT DIET; Regular; Low Fat/Low Chol/High Fiber/2 gm Na  ADULT ORAL NUTRITION SUPPLEMENT; Breakfast, Lunch, Dinner; Low Calorie/High Protein Oral Supplement  Passing flatus: YES  Tolerating current diet: YES       Pain Management:   Patient states pain is manageable on current regimen: N/A    Skin:  Rudy Scale Score: 20  Interventions: increase time out of bed    Patient Safety:  Fall Score: Doyle Total Score: 60  Interventions: bed/chair alarm, assistive device (walker, cane. etc), and pt to call before getting OOB       Length of Stay:  Expected LOS: 8  Actual LOS: 5      Giovana Story RN   
End of Shift Note    Bedside shift change report given to Willow (oncoming nurse) by KIKE BENNETT RN (offgoing nurse).  Report included the following information SBAR    Shift worked:  7a-7p     Shift summary and any significant changes:     Heparin gtt therapeutic     Concerns for physician to address:  Heparin gtt; chest pain resolving however still present     Zone phone for oncoming shift:   no       Activity:  Level of Assistance: Independent  Number times ambulated in hallways past shift: 2  Number of times OOB to chair past shift: 4    Cardiac:   Cardiac Monitoring: Yes      Cardiac Rhythm: Sinus rhythm    Access:  Current line(s): PIV     Genitourinary:   Urinary Status: Voiding    Respiratory:   O2 Device: None (Room air)  Chronic home O2 use?: NO  Incentive spirometer at bedside: NO    GI:  Last BM (including prior to admit): 02/16/25  Current diet:  ADULT DIET; Regular; Low Fat/Low Chol/High Fiber/2 gm Na  Passing flatus: YES    Pain Management:   Patient states pain is manageable on current regimen: YES    Skin:  Rudy Scale Score: 22  Interventions: Wound Offloading (Prevention Methods): Repositioning    Patient Safety:  Fall Risk: Nursing Judgement-Fall Risk High(Add Comments): Yes  Fall Risk Interventions  Nursing Judgement-Fall Risk High(Add Comments): Yes  Toilet Every 2 Hours-In Advance of Need: Yes  Hourly Visual Checks: In bed, Awake  Fall Visual Posted: Armband  Room Door Open: Yes  Alarm On: Bed  Patient Moved Closer to Nursing Station: No    Active Consults:   IP CONSULT TO CARDIOLOGY  IP CONSULT TO CARDIAC REHAB  IP CONSULT TO GENERAL SURGERY  IP CONSULT TO CARDIOVASCULAR SURGERY    Length of Stay:  Expected LOS: 6  Actual LOS: 4    KIKE BENNETT, NATHAN                           
Handoff report given to NATHAN Marcos. Pt resting in bed. VSS, labs done, am weight complete, I&O's as noted in chart, meds given as noted in chart.Pt had no significant issues overnight.  
[facility] FLOOR (Pre-op) Progress Note    Admit Date: 2025  POD: 3 Days Post-Op      Procedure:  Procedure(s):  Left heart cath / coronary angiography  Ultrasound guided vascular access      Subjective/overnight events:   Pt seen with Dr. Lucia. Afebrile, on RA. No complaints.     Objective:     BP (!) 99/47   Pulse 57   Temp 97.8 °F (36.6 °C) (Oral)   Resp 17   Ht 1.727 m (5' 8\")   Wt 70.7 kg (155 lb 13.8 oz)   SpO2 100%   BMI 23.70 kg/m²   Temp (24hrs), Av.8 °F (36.6 °C), Min:97.4 °F (36.3 °C), Max:98.1 °F (36.7 °C)      Last 24hr Input/Output:    Intake/Output Summary (Last 24 hours) at 2025 1149  Last data filed at 2025  Gross per 24 hour   Intake 200 ml   Output --   Net 200 ml        Pre-op Workup    Carotid duplex,25 :     Mild (<50%) stenosis in the right internal carotid artery.  Mild and heterogeneous plaque in the right internal carotid artery.    Moderate (50-69%) stenosis in the left internal carotid artery.  Moderate and heterogeneous plaque in the left internal carotid artery.    Normal antegrade flow involving the right vertebral artery.    Normal antegrade flow involving the left vertebral artery.    Cardiac cath: 25    CARDIAC PROCEDURE 2025  9:11 AM (Final)    Conclusion  Cardiac Cathetherization Note    PreOp Diagnosis:  []       Chest Pain  []       STEMI  []       Unstable Angina  [x]       NSTEMI  []       Cardiomyopathy/Heart Failure  []       Abnormal Stress Test  []       Valve Disease  []       Pre-Operative Evaluation  []       Other:    Findings/PostOp Diagnosis:  1. Heavily calcified 3v CAD  2. Normal LVEDP    Recommendations:  1. CTS consult  2. Routine post procedure & access site care  3. Continue aggressive medical management and risk factor modification    I have explained the nature of cardiac catheterization and possible percutaneous coronary intervention including risks and benefits of the procedure with the patient which include at 
Orders received, chart reviewed and patient evaluated by physical therapy. Pending progression with skilled acute physical therapy, recommend:    No skilled physical therapy    Recommend with nursing OOB to chair 3x/day and walking daily with Supervision assist and single point cane. Thank you for completing as able in order to maintain patient strength, endurance and independence.     Full evaluation to follow.     
PHYSICAL THERAPY EVALUATION/DISCHARGE    Patient: Alexandr Lewis (84 y.o. male)  Date: 2/18/2025  Primary Diagnosis: Internal hernia [K45.8]  Acute coronary syndrome (HCC) [I24.9]  NSTEMI (non-ST elevated myocardial infarction) (HCC) [I21.4]  Procedure(s) (LRB):  Left heart cath / coronary angiography (N/A)  Ultrasound guided vascular access (N/A) 1 Day Post-Op   Precautions: None                      ASSESSMENT AND RECOMMENDATIONS:  Pt seen for PT evaluation following admission for NSTEMI, now s/p [facility]. Based on the objective data below, the patient is at functional baseline. Pt encountered semi-reclined in bed in NAD on RA, cleared by RN and agreeable to participate in therapy. Transferred to sit EOB Mod I. Ambulated with SPC, Mod I, VSS throughout. Pt left seated in bedside chair in NAD, all needs met. Son works but pt has sister/niece that can assist if needed; pt presents with no needs at this time. Further skilled acute physical therapy is not indicated at this time.    Functional Outcome Measure:  The patient scored 23/24 on the [facility] outcome measure which is indicative of lower odds of discharging home with home health or need of SNF/IPR.          PLAN :  Recommendation for discharge: (in order for the patient to meet his/her long term goals):   No skilled physical therapy    Other factors to consider for discharge: no additional factors    IF patient discharges home will need the following DME: patient owns DME required for discharge       SUBJECTIVE:   Patient stated “I didn't know I could get up to the chair.”    OBJECTIVE DATA SUMMARY:     Past Medical History:   Diagnosis Date    Arthritis     Cancer (HCC)     PROSTATE    Hx SBO 2021    required surgery for lysis of adhesions    Hypertension     Inguinal hernia bilateral, non-recurrent 2024    Kidney disease stage 3 2019    sees Dr Enrrique Polk    Other ill-defined conditions(889.89)     elevated cholesterol    Ventral hernia 2024     Past Surgical 
Patient arrived into CCU at 1300 from OR with OR team, Dr Lucia , and MARCIA GOLDSTEIN. Patient CABG x 3 with husam, dex and insulin gtts infusing. Report received from CRNA at the bedside.  Chest tube drainage so far 110cc.  Labs, chest x-ray, EKG, and assessment completed.    Alexandr Lewis was extubated on 2/21/25 at 1700 PM.  Pt very drowsy with apneic episodes when placed on SBT. Pt also very hypothermic post-op. Pt required additional time to warm up and wake up prior to extubation.  ABG discussed with Dr. Gordon and pt assessed prior to extubation.    Shift totals:  UOP: 822cc  : 278cc      
Pharmacy Medication History Review    Medication history obtained by Christine Pavon while patient was in room 2402/01 and was completed based on information available during current patient encounter. Medication history was completed before home meds were reconciled by provider.    Pharmacist Admission Medication Reconciliation Recommendations:            PTA medication list was corrected to the following:   Prior to Admission Medications   Prescriptions Last Dose Informant   Cyanocobalamin (VITAMIN B 12 PO) Unknown Family Member   Sig: Take 1,000 Units by mouth daily   acetaminophen (AMINOFEN) 325 MG tablet     Sig: Take 2 tablets by mouth every 6 hours for 7 days   aspirin 81 MG chewable tablet Unknown Family Member   Sig: Take 1 tablet by mouth daily Resume on 8/5/2024 as per surgery recommendations   atorvastatin (LIPITOR) 40 MG tablet Unknown Family Member   Sig: Take 1 tablet by mouth daily   ferrous sulfate (IRON 325) 325 (65 Fe) MG tablet Unknown Family Member   Sig: Take 1 tablet by mouth daily (with breakfast)   lisinopril-hydroCHLOROthiazide (PRINZIDE;ZESTORETIC) 20-25 MG per tablet Unknown Family Member   Sig: Take 1 tablet by mouth daily   vitamin D (CHOLECALCIFEROL) 25 MCG (1000 UT) TABS tablet Unknown Family Member   Sig: Take 1 tablet by mouth daily      Facility-Administered Medications: None         Pertinent Information:      The following medications have been added:   - Vitamin B 12 (1,000 units)  The following medications have been removed: none  The following medications have been modified: none  The patient takes the following medications differently than prescribed: none     Medication history obtained from: Family member granddaughter    Who takes care of medications prior to arrival:  granddaughter  Able to recall:  medications were brought in a bag.   Adherence: Good adherence (>80-90% doses)    Patient's preferred pharmacy: Confirmed    Is patient interested in MTB? N/A    Allergy Update: 
Physical Therapy     Orders received and acknowledged, chart reviewed. Attempted to see patient for PT evaluation, pt unavailable 2/2 off the floor. PT will continue to follow as medically appropriate and available.     Thank you,  MEGHAN WHEELER, PT, DPT   
Physical Therapy    Orders received and acknowledged, chart reviewed. Pt POD 0 s/p CABG x3. PT evaluation deferred today and will follow up tomorrow, per guidelines.     Thank you,  Xuan Hare, PT, DPT     
Procedure Type: Isolated CABG   Perioperative Outcome Estimate %   Operative Mortality 4.68%   Morbidity & Mortality 16.1%   Stroke 1.75%   Renal Failure 9.13%   Reoperation 3.56%   Prolonged Ventilation 7.33%   Deep Sternal Wound Infection 0.069%   Long Hospital Stay (>14 days) 12.9%   Short Hospital Stay (<6 days)* 16.3%         Clinical Summary       Planned Surgery: Isolated CABG, Elective, First cardiovascular surgery   Demographics: 84 year old, Black/, male, 70.7kg, 172.7cm, BMI: 23.7 kg/m²   Insurance/Payor: Medicare   Lab Values: Creatinine: 1.81 mg/dL, Hematocrit: 27.3%, WBC Count: 6.6 10³/?L, Platelet Count: 375133 cells/?L   Substance Abuse: Never smoker   Risk Factors / Comorbidities: Hypertension   Pulmonary RF: Unknown CLD   Cardiac Status: Ejection Fraction = 45%   Coronary Artery Disease: 3 vessels diseased, Non-ST Elevation MI, MI: 1 to 7 Days   Valve Disease: Trivial/Trace AR     
Pt had CABG x3 today.  OT will follow up tomorrow for assessment per guidelines.    
Same day admission follow up    Reviewed admitting MD H&P and agree with assessment and plan.  Labs, diagnostics, EKG and vital signs personally reviewed.  Continue current plan of care with ongoing monitoring.  Patient was admitted today by my colleague and I will be covering their care should any needs arise- please do not hesitate to contact.     Pt is ambulating around room. No cp. Remains on heparin gtt.  Appreciate cardiology and surgery's evaluation.   
Spiritual Care Partner Volunteer visited patient at [facility] in [facility] 2 INTRVNT[facility] CARDIO on 2/18/2025   Documented by:  MUNA Uriostegui  
Spiritual Health History and Assessment/Progress Note  [facility]    Loneliness/Social Isolation,  , Life Adjustments, Adjustment to illness,      Name: Alexandr Lewis MRN: [id number]    Age: 84 y.o.     Sex: male   Language: English   Scientology: Jainism   NSTEMI (non-ST elevated myocardial infarction) (HCC)     Date: 2/23/2025            Total Time Calculated: 10 min              Spiritual Assessment began in MRM 2 CVICU        Referral/Consult From: Multi-disciplinary team   Encounter Overview/Reason: Loneliness/Social Isolation  Service Provided For: Patient    Faiza, Belief, Meaning:   Patient has beliefs or practices that help with coping during difficult times  Family/Friends No family/friends present      Importance and Influence:  Patient has spiritual/personal beliefs that influence decisions regarding their health  Family/Friends No family/friends present    Community:  Patient is connected with a spiritual community and feels well-supported. Support system includes: Children, Faiza Community, Friends, and Extended family  Family/Friends No family/friends present    Assessment and Plan of Care:     Patient Interventions include: Facilitated expression of thoughts and feelings, Explored spiritual coping/struggle/distress, Affirmed coping skills/support systems, and Facilitated life review and/ or legacy  Family/Friends Interventions include: No family/friends present    Patient Plan of Care: No spiritual needs identified for follow-up and Spiritual Care available upon further referral  Family/Friends Plan of Care: No family/friends present    Patient was socially isolated list and in 2402. He seemed comfortable in recliner and stated he was doing well when  asked. He shared that he has three grown children and a good support system. He also finds comfort in faiza. No particular spiritual need identified, he thanked  for the visit.     Electronically signed by Roge SMITH 
TRANSFER - IN REPORT:    Verbal report received from Amber on Alexandr Lewis  being received from [facility] for routine progression of care. Report consisted of patient’s Situation, Background, Assessment and Recommendations(SBAR).  Opportunity for questions and clarification was provided. Assessment completed upon patient’s arrival to IVCU and care assumed.       PROCEDURE SITE CHECK:    Procedure site: R radial. Patient currently has no c/o pain/discomfort reported at procedure site. TR Band on at 11ml. No s/s of bleeding bruising, or hematoma.             
hospitals FLOOR (Pre-op) Progress Note    Admit Date: 2025  POD: 1 Day Post-Op      Procedure:  Procedure(s):  Left heart cath / coronary angiography  Ultrasound guided vascular access      Subjective/overnight events:   Pt discussed with Dr. Lucia. Seen in bed. In NSR, on room air, afebrile. Vital signs stable. No events overnight. No CP or SOB.    On the following infusions: heparin.  Objective:     BP (!) 107/49   Pulse 70   Temp (!) 56 °F (13.3 °C)   Resp 16   Ht 1.727 m (5' 8\")   Wt 70.7 kg (155 lb 13.8 oz)   SpO2 100%   BMI 23.70 kg/m²   Temp (24hrs), Av.9 °F (32.7 °C), Min:56 °F (13.3 °C), Max:98.3 °F (36.8 °C)      Last 24hr Input/Output:    Intake/Output Summary (Last 24 hours) at 2025 0740  Last data filed at 2025 1216  Gross per 24 hour   Intake --   Output 300 ml   Net -300 ml        Pre-op Workup    Carotid duplex,25 :     Mild (<50%) stenosis in the right internal carotid artery.  Mild and heterogeneous plaque in the right internal carotid artery.    Moderate (50-69%) stenosis in the left internal carotid artery.  Moderate and heterogeneous plaque in the left internal carotid artery.    Normal antegrade flow involving the right vertebral artery.    Normal antegrade flow involving the left vertebral artery.    Cardiac cath: 25    CARDIAC PROCEDURE 2025  9:11 AM (Final)    Conclusion  Cardiac Cathetherization Note    PreOp Diagnosis:  []       Chest Pain  []       STEMI  []       Unstable Angina  [x]       NSTEMI  []       Cardiomyopathy/Heart Failure  []       Abnormal Stress Test  []       Valve Disease  []       Pre-Operative Evaluation  []       Other:    Findings/PostOp Diagnosis:  1. Heavily calcified 3v CAD  2. Normal LVEDP    Recommendations:  1. CTS consult  2. Routine post procedure & access site care  3. Continue aggressive medical management and risk factor modification    I have explained the nature of cardiac catheterization and possible percutaneous 
SURGERY    Length of Stay:  Expected LOS: 6  Actual LOS: 5    Willow Layne RN                            
Status: Draining, Sahu    Respiratory:   O2 Device: None (Room air)  ISS Teaching YES   Use : YES     Volume: 1250    GI:  Last BM (including prior to admit): 02/19/25  Current diet:  ADULT ORAL NUTRITION SUPPLEMENT; Breakfast, Dinner; Low Calorie/High Protein Oral Supplement  ADULT DIET; Dysphagia - Minced and Moist; 4 carb choices (60 gm/meal); Low Fat/Low Chol/High Fiber/TINY; No Concentrated sweets  Passing flatus: YES    Pain Management:   Excellent - Able to sleep and participate with therapy     Skin:  Rudy Scale Score: 15  Interventions: Wound Offloading (Prevention Methods): Bed, pressure redistribution/air, Repositioning, Turning, Pillows    Incision Care Completed This Shift: YES    CHG Bath Completed This Shift: YES    Patient Safety:  Fall Risk: Nursing Judgement-Fall Risk High(Add Comments): No  Fall Risk Interventions  Nursing Judgement-Fall Risk High(Add Comments): No  Toilet Every 2 Hours-In Advance of Need: Yes  Hourly Visual Checks: In bed, Eyes closed  Fall Visual Posted: Fall sign posted, Socks  Room Door Open: Yes  Alarm On: Bed  Patient Moved Closer to Nursing Station: No    Intake/Output  I/O this shift:  In: 179.9 [I.V.:179.9]  Out: -  530    Past 24 hrs  In: 4892.7 [P.O.:1460; I.V.:1011]  Out: 1608 [Urine:1038]     Net IO Since Admission: 11,624.24 mL [02/22/25 1939]      Last Weight Metrics:      2/20/2025    12:00 PM 2/18/2025     4:31 AM 2/17/2025     4:35 AM 2/13/2025    11:56 PM 2/5/2025     6:30 PM 8/8/2024    10:23 AM 7/29/2024     2:35 PM   Weight Loss Metrics   Height 5' 5\"   5' 8\" 5' 8\" 5' 5.98\" 5' 5.984\"   Weight - Scale 161 lbs 3 oz 155 lbs 14 oz 157 lbs 3 oz 156 lbs 8 oz 157 lbs 7 oz 161 lbs 10 oz    BMI (Calculated) 26.9 kg/m2 23.7 kg/m2 24 kg/m2 23.8 kg/m2 24 kg/m2 26.2 kg/m2        Weight change:      Accuracy of I/O Report Reviewed: YES    Drips:   Vasopressin  Norepinephrine    Number of Albumin Given: 1    Critical Lab Results:  There are no findings with open

## 2025-02-27 NOTE — PROCEDURES
PROCEDURE NOTE  Date: 2/27/2025   Name: Alexandr Lewis  YOB: 1940    Procedures     AV wire site cleansed with CHG prep.  A and V wires cut per physician order. Pt educated. RN updated. VSS.       Montse Mendez, LEVI - NP

## 2025-02-27 NOTE — CARE COORDINATION
Pt clear from CM standpoint.    Transition of Care Plan:    RUR: 19% \"medium risk\"  Prior Level of Functioning: Independent with ADL's  Disposition: Home with family support and [facility]  STEPHANIE: 2/27  If SNF or IPR: Date FOC offered: N/A  Follow up appointments: PCP/Specialists as indicated  DME needed: None - pt has a cane and walker at home   Transportation at discharge: Family to transport   IM/IMM Medicare/ letter given: 2nd IM given by CM on 2/27  Is patient a  and connected with [facility]? No  Caregiver Contact: Alexandra Haney (niece), [phone]  Discharge Caregiver contacted prior to discharge? To be contacted  Care Conference needed? Not at this time   Barriers to discharge: None     0912 AM: Chart reviewed. CM following for d/c planning. Pt to return home with family support and [facility] HH. CM sent update to [facility] on d/c plan.     02/27/25 1041   Services At/After Discharge   Transition of Care Consult (CM Consult) Discharge Planning   Services At/After Discharge Home Health    Resource Information Provided? No   Mode of Transport at Discharge Other (see comment)  (family)   Hospital Transport Time of Discharge 1300   Confirm Follow Up Transport Family   Condition of Participation: Discharge Planning   The Plan for Transition of Care is related to the following treatment goals: Return home with HH   The Patient and/or Patient Representative was provided with a Choice of Provider? Patient   The Patient and/Or Patient Representative agree with the Discharge Plan? Yes     MEGHNA Ingram  Care Management  [facility]   [phone]

## 2025-02-27 NOTE — DISCHARGE SUMMARY
on: February 28, 2025     clopidogrel 75 MG tablet  Commonly known as: PLAVIX  Take 1 tablet by mouth daily  Start taking on: February 28, 2025     furosemide 20 MG tablet  Commonly known as: Lasix  Take 1 tablet by mouth daily for 7 days     lidocaine 4 % external patch  Apply 2 patches topically daily for 5 days  Start taking on: February 28, 2025     metoprolol tartrate 25 MG tablet  Commonly known as: LOPRESSOR  Take 0.5 tablets by mouth 2 times daily     oxyCODONE 5 MG immediate release tablet  Commonly known as: ROXICODONE  Take 1 tablet by mouth every 6 hours as needed for Pain for up to 3 days. Max Daily Amount: 20 mg     polyethylene glycol 17 g packet  Commonly known as: GLYCOLAX  Take 1 packet by mouth daily for 7 days  Start taking on: February 28, 2025     sennosides-docusate sodium 8.6-50 MG tablet  Commonly known as: SENOKOT-S  Take 1 tablet by mouth daily as needed for Constipation            CHANGE how you take these medications      acetaminophen 325 MG tablet  Commonly known as: Aminofen  Take 2 tablets by mouth every 6 hours as needed for Pain  What changed:   when to take this  reasons to take this            CONTINUE taking these medications      aspirin 81 MG chewable tablet  Take 1 tablet by mouth daily Resume on 8/5/2024 as per surgery recommendations     atorvastatin 40 MG tablet  Commonly known as: LIPITOR     ferrous sulfate 325 (65 Fe) MG tablet  Commonly known as: IRON 325     VITAMIN B 12 PO     vitamin D 25 MCG (1000 UT) Tabs tablet  Commonly known as: CHOLECALCIFEROL            STOP taking these medications      lisinopril-hydroCHLOROthiazide 20-25 MG per tablet  Commonly known as: PRINZIDE;ZESTORETIC               Where to Get Your Medications        These medications were sent to [place], [place] - [address] - P [phone] - F [phone]  [address], [facility] [address]      Phone: [phone]   acetaminophen 325 MG

## 2025-02-28 ENCOUNTER — TELEPHONE (OUTPATIENT)
Facility: HOSPITAL | Age: 85
End: 2025-02-28

## 2025-02-28 NOTE — TELEPHONE ENCOUNTER
Cardiac Surgery Discharge - Follow up call placed to Alexandr Lewis. Spoke with pt's grandson. Reviewed plan of care after discharge and encouraged him to verbalize. Discussed precautions. Pt received medications through meds to beds prior to discharge and they were reviewed at that time with pt and family. Patient without questions regarding medications. Encouraged continued use of the incentive spirometer, daily weights and temp. Pt is showering and walking. Confirmed follow up appts and reinforced importance of wearing red reminder bracelet. Gave grandson appointment for pt to see Dr. Calvin 4/23/25 at 11:00am. Grandson and pt are without questions or concerns. Lilly Lewis RN

## 2025-03-01 ENCOUNTER — HOSPITAL ENCOUNTER (EMERGENCY)
Facility: HOSPITAL | Age: 85
Discharge: HOME | End: 2025-03-01
Attending: STUDENT IN AN ORGANIZED HEALTH CARE EDUCATION/TRAINING PROGRAM
Payer: MEDICARE

## 2025-03-01 VITALS
SYSTOLIC BLOOD PRESSURE: 107 MMHG | OXYGEN SATURATION: 100 % | HEART RATE: 75 BPM | TEMPERATURE: 97.2 F | DIASTOLIC BLOOD PRESSURE: 60 MMHG | RESPIRATION RATE: 12 BRPM

## 2025-03-01 DIAGNOSIS — R04.0 EPISTAXIS: Primary | ICD-10-CM

## 2025-03-01 LAB
ABO + RH BLD: NORMAL
ANION GAP SERPL CALC-SCNC: 4 MMOL/L (ref 2–12)
APTT PPP: 27.1 SEC (ref 22.1–31)
BASOPHILS # BLD: 0.02 K/UL (ref 0–0.1)
BASOPHILS NFR BLD: 0.2 % (ref 0–1)
BLOOD GROUP ANTIBODIES SERPL: NORMAL
BUN SERPL-MCNC: 54 MG/DL (ref 6–20)
BUN/CREAT SERPL: 25 (ref 12–20)
CALCIUM SERPL-MCNC: 9 MG/DL (ref 8.5–10.1)
CHLORIDE SERPL-SCNC: 108 MMOL/L (ref 97–108)
CO2 SERPL-SCNC: 26 MMOL/L (ref 21–32)
CREAT SERPL-MCNC: 2.16 MG/DL (ref 0.7–1.3)
DIFFERENTIAL METHOD BLD: ABNORMAL
EOSINOPHIL # BLD: 0.26 K/UL (ref 0–0.4)
EOSINOPHIL NFR BLD: 2.2 % (ref 0–7)
ERYTHROCYTE [DISTWIDTH] IN BLOOD BY AUTOMATED COUNT: 16.9 % (ref 11.5–14.5)
GLUCOSE SERPL-MCNC: 104 MG/DL (ref 65–100)
HCT VFR BLD AUTO: 27.2 % (ref 36.6–50.3)
HGB BLD-MCNC: 8.9 G/DL (ref 12.1–17)
IMM GRANULOCYTES # BLD AUTO: 0.13 K/UL (ref 0–0.04)
IMM GRANULOCYTES NFR BLD AUTO: 1.1 % (ref 0–0.5)
INR PPP: 1.1 (ref 0.9–1.1)
LYMPHOCYTES # BLD: 1.04 K/UL (ref 0.8–3.5)
LYMPHOCYTES NFR BLD: 9 % (ref 12–49)
MCH RBC QN AUTO: 28 PG (ref 26–34)
MCHC RBC AUTO-ENTMCNC: 32.7 G/DL (ref 30–36.5)
MCV RBC AUTO: 85.5 FL (ref 80–99)
MONOCYTES # BLD: 1.56 K/UL (ref 0–1)
MONOCYTES NFR BLD: 13.4 % (ref 5–13)
NEUTS SEG # BLD: 8.59 K/UL (ref 1.8–8)
NEUTS SEG NFR BLD: 74.1 % (ref 32–75)
NRBC # BLD: 0 K/UL (ref 0–0.01)
NRBC BLD-RTO: 0 PER 100 WBC
PLATELET # BLD AUTO: 232 K/UL (ref 150–400)
PMV BLD AUTO: 10 FL (ref 8.9–12.9)
POTASSIUM SERPL-SCNC: 4.6 MMOL/L (ref 3.5–5.1)
PROTHROMBIN TIME: 11.2 SEC (ref 9.2–11.2)
RBC # BLD AUTO: 3.18 M/UL (ref 4.1–5.7)
SODIUM SERPL-SCNC: 138 MMOL/L (ref 136–145)
SPECIMEN EXP DATE BLD: NORMAL
THERAPEUTIC RANGE: NORMAL SECS (ref 58–77)
WBC # BLD AUTO: 11.6 K/UL (ref 4.1–11.1)

## 2025-03-01 PROCEDURE — 86901 BLOOD TYPING SEROLOGIC RH(D): CPT

## 2025-03-01 PROCEDURE — 86900 BLOOD TYPING SEROLOGIC ABO: CPT

## 2025-03-01 PROCEDURE — 99284 EMERGENCY DEPT VISIT MOD MDM: CPT

## 2025-03-01 PROCEDURE — 85730 THROMBOPLASTIN TIME PARTIAL: CPT

## 2025-03-01 PROCEDURE — 80048 BASIC METABOLIC PNL TOTAL CA: CPT

## 2025-03-01 PROCEDURE — 85610 PROTHROMBIN TIME: CPT

## 2025-03-01 PROCEDURE — 85025 COMPLETE CBC W/AUTO DIFF WBC: CPT

## 2025-03-01 PROCEDURE — 6370000000 HC RX 637 (ALT 250 FOR IP): Performed by: STUDENT IN AN ORGANIZED HEALTH CARE EDUCATION/TRAINING PROGRAM

## 2025-03-01 PROCEDURE — 86850 RBC ANTIBODY SCREEN: CPT

## 2025-03-01 PROCEDURE — 36415 COLL VENOUS BLD VENIPUNCTURE: CPT

## 2025-03-01 RX ORDER — OXYMETAZOLINE HYDROCHLORIDE 0.05 G/100ML
2 SPRAY NASAL
Status: COMPLETED | OUTPATIENT
Start: 2025-03-01 | End: 2025-03-01

## 2025-03-01 RX ORDER — OXYMETAZOLINE HYDROCHLORIDE 0.05 G/100ML
2 SPRAY NASAL AS NEEDED
Qty: 6 ML | Refills: 3 | Status: SHIPPED | OUTPATIENT
Start: 2025-03-01

## 2025-03-01 RX ADMIN — OXYMETAZOLINE HYDROCHLORIDE 2 SPRAY: 0.5 SPRAY NASAL at 02:07

## 2025-03-01 NOTE — ED TRIAGE NOTES
Through triage c/o epistaxis 90m pta. Active bleeding in triage. Pt reports recent cardiac bypass.

## 2025-03-01 NOTE — CARE COORDINATION
CM checked ED follow up book asking for pt to be set up with HH. Chart reviewed and pt is set up with [facility]. CM called pt to follow up.  CM spoke with pt's grandson who stated [facility] is coming to the home today.    Candis Callahan LMSW, [facility]  Care Management, [facility]  [phone]

## 2025-03-01 NOTE — ED PROVIDER NOTES
[facility] EMERGENCY DEPARTMENT  EMERGENCY DEPARTMENT ENCOUNTER       Pt Name: Alexandr Lewis  MRN: [id number]  Birthdate 1940  Date of evaluation: 3/1/2025  Provider: Annmarie Camacho MD   PCP: Valentina Hanson APRN - NP  Note Started: 1:24 AM EST 3/1/25  Attending Physician: Dr. Don Garcia    CHIEF COMPLAINT       Chief Complaint   Patient presents with    Epistaxis        HISTORY OF PRESENT ILLNESS: 1 or more elements      History From: Patient and patient's granddaughter, History limited by: none     Alexandr Lewis is a 84 y.o. male with past medical history of prostate cancer, CKD, recent NSTEMI s/p CABG x 3 on 2/21/25 presenting with a nosebleed that began this evening.  Patient lives with his son, bleeding started around 2300 and has been persistent since with passage of clots.  Patient's granddaughter present on initial evaluation.  Patient not currently bleeding on evaluation, patient has no acute complaints, he denies feeling like blood is dripping down the back of his throat, denies any nausea, has not had any vomiting, denies any abdominal or chest pain.      Patient is currently on aspirin and Plavix.       Please See MDM for Additional Details of the HPI/PMH  Nursing Notes were all reviewed and agreed with or any disagreements were addressed in the HPI.     REVIEW OF SYSTEMS        Positives and Pertinent negatives as per HPI.    PAST HISTORY     Past Medical History:  Past Medical History:   Diagnosis Date    Arthritis     Cancer (HCC)     PROSTATE    Hx SBO 2021    required surgery for lysis of adhesions    Hypertension     Inguinal hernia bilateral, non-recurrent 2024    Kidney disease stage 3 2019    sees Dr Enrrique Polk    Other ill-defined conditions(799.89)     elevated cholesterol    Ventral hernia 2024       Past Surgical History:  Past Surgical History:   Procedure Laterality Date    ABDOMINAL EXPLORATION SURGERY  2021    lysis of adhesions from prostatectomy    CARDIAC  Temp 03/01/25 0032 97.2 °F (36.2 °C)      Temp Source 03/01/25 0032 Axillary      SpO2 03/01/25 0030 99 %      Weight --       Height --       Head Circumference --       Peak Flow --       Pain Score --       Pain Loc --       Pain Education --       Exclude from Growth Chart --         Physical Exam  Constitutional:       General: He is not in acute distress.     Appearance: He is not toxic-appearing.   HENT:      Nose:      Comments: Dried blood per bilateral ears, no active bleeding, posterior pharynx visualized without blood present     Mouth/Throat:      Mouth: Mucous membranes are moist.      Pharynx: Oropharynx is clear.   Cardiovascular:      Rate and Rhythm: Normal rate and regular rhythm.   Pulmonary:      Effort: Pulmonary effort is normal. No respiratory distress.   Abdominal:      General: There is no distension.      Palpations: Abdomen is soft.      Tenderness: There is no abdominal tenderness.   Skin:     General: Skin is warm and dry.   Neurological:      Mental Status: He is alert.          DIAGNOSTIC RESULTS   LABS:     Recent Results (from the past 24 hour(s))   CBC with Auto Differential    Collection Time: 03/01/25  1:02 AM   Result Value Ref Range    WBC 11.6 (H) 4.1 - 11.1 K/uL    RBC 3.18 (L) 4.10 - 5.70 M/uL    Hemoglobin 8.9 (L) 12.1 - 17.0 g/dL    Hematocrit 27.2 (L) 36.6 - 50.3 %    MCV 85.5 80.0 - 99.0 FL    MCH 28.0 26.0 - 34.0 PG    MCHC 32.7 30.0 - 36.5 g/dL    RDW 16.9 (H) 11.5 - 14.5 %    Platelets 232 150 - 400 K/uL    MPV 10.0 8.9 - 12.9 FL    Nucleated RBCs 0.0 0  WBC    nRBC 0.00 0.00 - 0.01 K/uL    Neutrophils % 74.1 32.0 - 75.0 %    Lymphocytes % 9.0 (L) 12.0 - 49.0 %    Monocytes % 13.4 (H) 5.0 - 13.0 %    Eosinophils % 2.2 0.0 - 7.0 %    Basophils % 0.2 0.0 - 1.0 %    Immature Granulocytes % 1.1 (H) 0.0 - 0.5 %    Neutrophils Absolute 8.59 (H) 1.80 - 8.00 K/UL    Lymphocytes Absolute 1.04 0.80 - 3.50 K/UL    Monocytes Absolute 1.56 (H) 0.00 - 1.00 K/UL

## 2025-03-01 NOTE — DISCHARGE INSTRUCTIONS
You were seen in the [facility] emergency department for a nosebleed.  The bleeding stopped on exam here and your lab work is at your baseline or within normal limits.  We have sent you prescription for Afrin nasal spray, this is only to be used if you have repeat bleeding, you can put 2 sprays in each nostril that is bleeding however only do this after you have held pressure for 15 minutes and you have continued bleeding.  At that time, please call your primary care provider or return to emergency department.  Instructions on treating nosebleeds are attached for you.    Please also return to the Emergency Department if you have significant headache, worsening confusion, lightheadedness, chest pain, nausea and vomiting.  Please call with any further questions or concerns

## 2025-03-06 ENCOUNTER — OFFICE VISIT (OUTPATIENT)
Age: 85
End: 2025-03-06

## 2025-03-06 VITALS
TEMPERATURE: 97.5 F | OXYGEN SATURATION: 100 % | WEIGHT: 161.8 LBS | HEART RATE: 55 BPM | DIASTOLIC BLOOD PRESSURE: 70 MMHG | BODY MASS INDEX: 26.92 KG/M2 | SYSTOLIC BLOOD PRESSURE: 138 MMHG

## 2025-03-06 DIAGNOSIS — Z95.1 S/P CABG X 3: Primary | ICD-10-CM

## 2025-03-06 DIAGNOSIS — Z98.890 S/P LEFT ATRIAL APPENDAGE LIGATION: ICD-10-CM

## 2025-03-06 NOTE — PROGRESS NOTES
Name: Alexandr Lewis   : 1940   Home Phone: [phone]     Reviewed record in preparation for visit and have obtained necessary documentation. Identified pt with two pt identifiers (name and ).     1. Have you been to the ER, urgent care clinic since your last visit?  Hospitalized since your last visit?No    2. Have you seen or consulted any other health care providers outside of the [facility] since your last visit?  Include any pap smears or colon screening. No      Alexandr Lewis is being seen for CABG follow up approximately “One week post-operatively.     Patient counseled on Wound care, Diet, ERAS Protocol, Signs of infection, and Office contact information and instructed to follow up  in approximately one month. Patient given opportunity to ask questions and receive clarification.     Current Medications-Reviewed with Patient    Allergies-Reviewed with Patient    Patient's sternotomy dressing removed per protocol with no complications. Image uploaded to patient's media tab.      Vitals  /70   Pulse 53   Temp 97.5 °F (36.4 °C)   Wt 73.4 kg (161 lb 12.8 oz)   SpO2 100%   BMI 26.92 kg/m²

## 2025-03-06 NOTE — PROGRESS NOTES
Patient: Alexandr Lewis   Age: 84 y.o.     Patient Care Team:  Valentina Hanson APRN - NP as PCP - General (Nurse Practitioner)  Bishop Whalen MD as Physician  Case Pryor MD (General Surgery)  Mateo Calvin III, DO as Consulting Physician (Cardiovascular Disease)  Zach Lucia MD as Consulting Physician (Cardiothoracic Surgery)    Diagnosis: The primary encounter diagnosis was S/P CABG x 3. A diagnosis of S/P left atrial appendage ligation was also pertinent to this visit.    Problem List:   Patient Active Problem List   Diagnosis    SBO (small bowel obstruction) ([facility])    Mesenteric ischemia    Bilateral inguinal hernia    Abdominal wall hernia    Chest pain    NSTEMI (non-ST elevated myocardial infarction) ([facility])    Bilateral carotid artery stenosis    Coronary artery disease    S/P CABG x 3    S/P left atrial appendage ligation    Acute coronary syndrome ([facility])      Date of Surgery: 2/21/25     Surgery: CABG, LAAL    HPI:  Pt is here for post op follow up. He was ambulating with a cane. He denies SOB, weight gain, LE edema. He has a good appetite. He reports he feels good but fatigued from the walk into the office. This is the furthest walk he has done since discharge.     Current Medications:   Current Outpatient Medications   Medication Sig Dispense Refill    oxymetazoline (12 HOUR NASAL SPRAY) 0.05 % nasal spray 2 sprays by Nasal route as needed (bleeding) 6 mL 3    amiodarone (CORDARONE) 200 MG tablet Take 2 tablets by mouth 2 times daily Take 2 tablets 2 times daily for 14 days then take 2 tablets once a day for 2 weeks. Then stop. Take with food. 84 tablet 0    metoprolol tartrate (LOPRESSOR) 25 MG tablet Take 0.5 tablets by mouth 2 times daily 60 tablet 3    polyethylene glycol (GLYCOLAX) 17 g packet Take 1 packet by mouth daily for 7 days 7 packet 0    clopidogrel (PLAVIX) 75 MG tablet Take 1 tablet by mouth daily 30 tablet 3    ascorbic acid (VITAMIN C) 500 MG tablet Take 1 tablet by mouth

## 2025-03-17 ENCOUNTER — HOSPITAL ENCOUNTER (OUTPATIENT)
Facility: HOSPITAL | Age: 85
Setting detail: RECURRING SERIES
Discharge: HOME | End: 2025-03-20
Payer: MEDICARE

## 2025-03-17 VITALS — BODY MASS INDEX: 28.26 KG/M2 | HEIGHT: 65 IN | OXYGEN SATURATION: 100 % | WEIGHT: 169.6 LBS

## 2025-03-17 PROCEDURE — 93797 PHYS/QHP OP CAR RHAB WO ECG: CPT

## 2025-03-17 PROCEDURE — 93798 PHYS/QHP OP CAR RHAB W/ECG: CPT

## 2025-03-17 ASSESSMENT — PATIENT HEALTH QUESTIONNAIRE - PHQ9
9. THOUGHTS THAT YOU WOULD BE BETTER OFF DEAD, OR OF HURTING YOURSELF: NOT AT ALL
SUM OF ALL RESPONSES TO PHQ QUESTIONS 1-9: 1
SUM OF ALL RESPONSES TO PHQ QUESTIONS 1-9: 1
5. POOR APPETITE OR OVEREATING: NOT AT ALL
3. TROUBLE FALLING OR STAYING ASLEEP: NOT AT ALL
8. MOVING OR SPEAKING SO SLOWLY THAT OTHER PEOPLE COULD HAVE NOTICED. OR THE OPPOSITE, BEING SO FIGETY OR RESTLESS THAT YOU HAVE BEEN MOVING AROUND A LOT MORE THAN USUAL: NOT AT ALL
10. IF YOU CHECKED OFF ANY PROBLEMS, HOW DIFFICULT HAVE THESE PROBLEMS MADE IT FOR YOU TO DO YOUR WORK, TAKE CARE OF THINGS AT HOME, OR GET ALONG WITH OTHER PEOPLE: NOT DIFFICULT AT ALL
1. LITTLE INTEREST OR PLEASURE IN DOING THINGS: NOT AT ALL
SUM OF ALL RESPONSES TO PHQ QUESTIONS 1-9: 1
7. TROUBLE CONCENTRATING ON THINGS, SUCH AS READING THE NEWSPAPER OR WATCHING TELEVISION: NOT AT ALL
4. FEELING TIRED OR HAVING LITTLE ENERGY: SEVERAL DAYS
2. FEELING DOWN, DEPRESSED OR HOPELESS: NOT AT ALL
6. FEELING BAD ABOUT YOURSELF - OR THAT YOU ARE A FAILURE OR HAVE LET YOURSELF OR YOUR FAMILY DOWN: NOT AT ALL
SUM OF ALL RESPONSES TO PHQ QUESTIONS 1-9: 1

## 2025-03-17 ASSESSMENT — EXERCISE STRESS TEST
PEAK_HR: 86
PEAK_BP: 138/80
PEAK_METS: 1.7
PEAK_RPE: 11

## 2025-03-17 NOTE — CARDIO/PULMONARY
INTAKE APPOINTMENT NOTE  3/17/2025    NAME: Alexandr Lewis : 1940 AGE: 84 y.o.  GENDER: male    CARDIAC REHAB ADMITTING DIAGNOSIS: S/P CABG (coronary artery bypass graft) [Z95.1]    REFERRING PHYSICIAN: Zach Lucia MD    MEDICAL HX:  Past Medical History:   Diagnosis Date    Arthritis     CAD (coronary artery disease)     Cancer (HCC)     PROSTATE    Hx SBO     required surgery for lysis of adhesions    Hypertension     Inguinal hernia bilateral, non-recurrent     Kidney disease stage 3 2019    sees Dr Enrrique Polk    Other ill-defined conditions(799.89)     elevated cholesterol    Ventral hernia        LABS:     No results found for: \"HBA1C\", \"[id number]\"  Lab Results   Component Value Date/Time    CHOL 86 2025 02:37 AM    HDL 46 2025 02:37 AM    LDL 31 2025 02:37 AM    VLDL 9 2025 02:37 AM         ANTHROPOMETRICS:      Ht Readings from Last 1 Encounters:   25 1.651 m (5' 5\")      Wt Readings from Last 1 Encounters:   25 76.9 kg (169 lb 9.6 oz)        WAIST: 38       VISIT SUMMARY:    Alexandr Lewis 84 y.o. presented to Cardiac Rehab for program orientation and 6 minute walk test today with a primary diagnosis of S/P CABG (coronary artery bypass graft) [Z95.1]. EF is  (40-45 per Echo 25) Cardiac risk factors include family history, dyslipidemia, hypertension.     Patient is a former smoker, he quit in .   Alexandr Lewis is  and lives with his wife. social hx. Patient was evaluated for depression using the PHQ-9 assessment tool with a result of 1 which is considered mild.     Patient denied chest pain or SOB during 6 minute walk test and was in SB/SR/BBB/PVCs/Inverted T. Patient walked 151 meters  for a final MET level of 1.7.     Exercise prescription developed using exercise tolerance results and patient stated goals, to be supplemented with home exercise recommendations. Education manual given to patient and reviewed. Patient will

## 2025-03-20 ENCOUNTER — HOSPITAL ENCOUNTER (OUTPATIENT)
Facility: HOSPITAL | Age: 85
Setting detail: RECURRING SERIES
Discharge: HOME | End: 2025-03-23
Payer: MEDICARE

## 2025-03-20 VITALS — BODY MASS INDEX: 27.39 KG/M2 | WEIGHT: 164.6 LBS

## 2025-03-20 PROCEDURE — 93798 PHYS/QHP OP CAR RHAB W/ECG: CPT

## 2025-03-20 ASSESSMENT — EXERCISE STRESS TEST
PEAK_METS: 1.7
PEAK_RPE: 11
PEAK_HR: 101

## 2025-03-25 ENCOUNTER — HOSPITAL ENCOUNTER (OUTPATIENT)
Facility: HOSPITAL | Age: 85
Setting detail: RECURRING SERIES
Discharge: HOME | End: 2025-03-28
Payer: MEDICARE

## 2025-03-25 VITALS — WEIGHT: 171.6 LBS | BODY MASS INDEX: 28.56 KG/M2

## 2025-03-25 PROCEDURE — 93798 PHYS/QHP OP CAR RHAB W/ECG: CPT

## 2025-03-25 ASSESSMENT — EXERCISE STRESS TEST
PEAK_HR: 83
PEAK_RPE: 11
PEAK_METS: 1.7

## 2025-03-27 ENCOUNTER — HOSPITAL ENCOUNTER (OUTPATIENT)
Facility: HOSPITAL | Age: 85
Setting detail: RECURRING SERIES
End: 2025-03-27
Payer: MEDICARE

## 2025-03-27 ENCOUNTER — OFFICE VISIT (OUTPATIENT)
Age: 85
End: 2025-03-27

## 2025-03-27 ENCOUNTER — HOSPITAL ENCOUNTER (OUTPATIENT)
Facility: HOSPITAL | Age: 85
Setting detail: RECURRING SERIES
Discharge: HOME | End: 2025-03-30
Payer: MEDICARE

## 2025-03-27 VITALS
OXYGEN SATURATION: 100 % | DIASTOLIC BLOOD PRESSURE: 76 MMHG | SYSTOLIC BLOOD PRESSURE: 159 MMHG | TEMPERATURE: 97.6 F | BODY MASS INDEX: 28.06 KG/M2 | WEIGHT: 168.6 LBS | HEART RATE: 59 BPM

## 2025-03-27 VITALS — BODY MASS INDEX: 28.62 KG/M2 | WEIGHT: 172 LBS

## 2025-03-27 DIAGNOSIS — Z95.1 S/P CABG X 3: Primary | ICD-10-CM

## 2025-03-27 DIAGNOSIS — I65.23 BILATERAL CAROTID ARTERY STENOSIS: ICD-10-CM

## 2025-03-27 DIAGNOSIS — Z98.890 S/P LEFT ATRIAL APPENDAGE LIGATION: ICD-10-CM

## 2025-03-27 PROCEDURE — 93798 PHYS/QHP OP CAR RHAB W/ECG: CPT

## 2025-03-27 PROCEDURE — 99024 POSTOP FOLLOW-UP VISIT: CPT | Performed by: THORACIC SURGERY (CARDIOTHORACIC VASCULAR SURGERY)

## 2025-03-27 RX ORDER — LISINOPRIL AND HYDROCHLOROTHIAZIDE 10; 12.5 MG/1; MG/1
1 TABLET ORAL DAILY
Qty: 30 TABLET | Refills: 5 | Status: CANCELLED | OUTPATIENT
Start: 2025-03-27

## 2025-03-27 ASSESSMENT — EXERCISE STRESS TEST
PEAK_RPE: 11
PEAK_METS: 1.7
PEAK_HR: 81

## 2025-03-27 NOTE — PROGRESS NOTES
Name: Alexandr Lewis   : 1940   Home Phone: [phone]     Reviewed record in preparation for visit and have obtained necessary documentation. Identified pt with two pt identifiers (name and ).     1. Have you been to the ER, urgent care clinic since your last visit?  Hospitalized since your last visit?No    2. Have you seen or consulted any other health care providers outside of the [facility] since your last visit?  Include any pap smears or colon screening. No      Alexandr Lewis is being seen for CABG follow up approximately One month post-operatively”.     Patient counseled on Wound care, Diet, ERAS Protocol, Signs of infection, and Office contact information and instructed to follow up  with primary cardiologist. Patient given opportunity to ask questions and receive clarification.     Current Medications-Reviewed with Patient    Allergies-Reviewed with Patient        Vitals  Wt 76.5 kg (168 lb 9.6 oz)   BMI 28.06 kg/m²           
chewable tablet Take 1 tablet by mouth daily Resume on 8/5/2024 as per surgery recommendations 30 tablet 3    ferrous sulfate (IRON 325) 325 (65 Fe) MG tablet Take 1 tablet by mouth daily (with breakfast)      atorvastatin (LIPITOR) 40 MG tablet Take 1 tablet by mouth daily      vitamin D (CHOLECALCIFEROL) 25 MCG (1000 UT) TABS tablet Take 1 tablet by mouth daily      acetaminophen (AMINOFEN) 325 MG tablet Take 2 tablets by mouth every 6 hours as needed for Pain 40 tablet 0    furosemide (LASIX) 20 MG tablet Take 1 tablet by mouth daily for 7 days 7 tablet 0     No current facility-administered medications for this visit.       Vitals: Blood pressure (!) 159/76, pulse 59, temperature 97.6 °F (36.4 °C), weight 76.5 kg (168 lb 9.6 oz), SpO2 100%.       Allergies: has no known allergies.    Physical Exam:  Wounds: clean, dry, no drainage    Lungs: clear to auscultation bilaterally    Heart: regular rate and rhythm, S1, S2 normal, no murmur, click, rub or gallop    Extremities: normal strength, tone, and muscle mass    Assessment/Plan:   CAD/NSTEMI-  s/p off pump CABG x3, LAAL. Cont ASA, statin, metoprolol and Plavix.  Plavix is for one year.    Mild right sided and moderate left sided carotid stenosis: Continue ASA, statin. OP follow up with Vascular.   HTN, stable: tolerating metoprolol 12.5 bid. BP elevated today in the office. His wife has been checking his BP at home and SPB has been between 105-120s. Will hold off on changes to BP meds at this time.   HLD: continue atorvastatin 40 mg at HS  CKD stage III: patient of Dr. Enrrique Polk . Baseline creatinine ~1.8-1.9. Completed lasix.   Moderately decreased ABIs bilaterally: OP follow up.   Hx of SBO, ventral hernia repair. Noted.      Pt is ready to start cardiac rehab.     Rehab - y  Walking: y  Glucometer: n/a  Antibiotic card for valves: n/a

## 2025-04-01 ENCOUNTER — APPOINTMENT (OUTPATIENT)
Facility: HOSPITAL | Age: 85
End: 2025-04-01
Payer: MEDICARE

## 2025-04-03 ENCOUNTER — APPOINTMENT (OUTPATIENT)
Facility: HOSPITAL | Age: 85
End: 2025-04-03
Payer: MEDICARE

## 2025-04-08 ENCOUNTER — HOSPITAL ENCOUNTER (OUTPATIENT)
Facility: HOSPITAL | Age: 85
Setting detail: RECURRING SERIES
Discharge: HOME | End: 2025-04-11
Payer: MEDICARE

## 2025-04-08 VITALS — BODY MASS INDEX: 28.76 KG/M2 | WEIGHT: 172.8 LBS

## 2025-04-08 PROCEDURE — 93798 PHYS/QHP OP CAR RHAB W/ECG: CPT

## 2025-04-08 ASSESSMENT — EXERCISE STRESS TEST
PEAK_METS: 1.7
PEAK_RPE: 11
PEAK_HR: 100

## 2025-04-15 ENCOUNTER — HOSPITAL ENCOUNTER (OUTPATIENT)
Facility: HOSPITAL | Age: 85
Setting detail: RECURRING SERIES
Discharge: HOME | End: 2025-04-18
Payer: MEDICARE

## 2025-04-15 VITALS — BODY MASS INDEX: 28.22 KG/M2 | WEIGHT: 169.6 LBS

## 2025-04-15 PROCEDURE — 93798 PHYS/QHP OP CAR RHAB W/ECG: CPT

## 2025-04-15 ASSESSMENT — EXERCISE STRESS TEST
PEAK_METS: 1.7
PEAK_HR: 88
PEAK_RPE: 11

## 2025-04-17 ENCOUNTER — HOSPITAL ENCOUNTER (OUTPATIENT)
Facility: HOSPITAL | Age: 85
Setting detail: RECURRING SERIES
Discharge: HOME | End: 2025-04-20
Payer: MEDICARE

## 2025-04-17 VITALS — WEIGHT: 173.4 LBS | BODY MASS INDEX: 28.86 KG/M2

## 2025-04-17 PROCEDURE — 93798 PHYS/QHP OP CAR RHAB W/ECG: CPT

## 2025-04-17 ASSESSMENT — EXERCISE STRESS TEST
PEAK_RPE: 11
PEAK_HR: 86
PEAK_METS: 1.7

## 2025-04-22 ENCOUNTER — HOSPITAL ENCOUNTER (OUTPATIENT)
Facility: HOSPITAL | Age: 85
Setting detail: RECURRING SERIES
Discharge: HOME | End: 2025-04-25
Payer: MEDICARE

## 2025-04-22 VITALS — WEIGHT: 171.6 LBS | BODY MASS INDEX: 28.56 KG/M2

## 2025-04-22 PROCEDURE — 93798 PHYS/QHP OP CAR RHAB W/ECG: CPT

## 2025-04-22 ASSESSMENT — EXERCISE STRESS TEST
PEAK_RPE: 11
PEAK_HR: 91
PEAK_METS: 1.7

## 2025-04-23 ENCOUNTER — HOSPITAL ENCOUNTER (OUTPATIENT)
Facility: HOSPITAL | Age: 85
Setting detail: RECURRING SERIES
Discharge: HOME | End: 2025-04-26
Payer: MEDICARE

## 2025-04-23 VITALS — WEIGHT: 170 LBS | BODY MASS INDEX: 28.29 KG/M2

## 2025-04-23 PROCEDURE — 93798 PHYS/QHP OP CAR RHAB W/ECG: CPT

## 2025-04-23 ASSESSMENT — PATIENT HEALTH QUESTIONNAIRE - PHQ9
SUM OF ALL RESPONSES TO PHQ QUESTIONS 1-9: 0
3. TROUBLE FALLING OR STAYING ASLEEP: NOT AT ALL
SUM OF ALL RESPONSES TO PHQ QUESTIONS 1-9: 0
1. LITTLE INTEREST OR PLEASURE IN DOING THINGS: NOT AT ALL
2. FEELING DOWN, DEPRESSED OR HOPELESS: NOT AT ALL
SUM OF ALL RESPONSES TO PHQ QUESTIONS 1-9: 0
4. FEELING TIRED OR HAVING LITTLE ENERGY: NOT AT ALL
7. TROUBLE CONCENTRATING ON THINGS, SUCH AS READING THE NEWSPAPER OR WATCHING TELEVISION: NOT AT ALL
10. IF YOU CHECKED OFF ANY PROBLEMS, HOW DIFFICULT HAVE THESE PROBLEMS MADE IT FOR YOU TO DO YOUR WORK, TAKE CARE OF THINGS AT HOME, OR GET ALONG WITH OTHER PEOPLE: NOT DIFFICULT AT ALL
SUM OF ALL RESPONSES TO PHQ QUESTIONS 1-9: 0
9. THOUGHTS THAT YOU WOULD BE BETTER OFF DEAD, OR OF HURTING YOURSELF: NOT AT ALL
6. FEELING BAD ABOUT YOURSELF - OR THAT YOU ARE A FAILURE OR HAVE LET YOURSELF OR YOUR FAMILY DOWN: NOT AT ALL
8. MOVING OR SPEAKING SO SLOWLY THAT OTHER PEOPLE COULD HAVE NOTICED. OR THE OPPOSITE, BEING SO FIGETY OR RESTLESS THAT YOU HAVE BEEN MOVING AROUND A LOT MORE THAN USUAL: NOT AT ALL
5. POOR APPETITE OR OVEREATING: NOT AT ALL

## 2025-04-23 ASSESSMENT — EXERCISE STRESS TEST
PEAK_HR: 86
PEAK_METS: 1.7
PEAK_RPE: 11

## 2025-04-29 ENCOUNTER — HOSPITAL ENCOUNTER (OUTPATIENT)
Facility: HOSPITAL | Age: 85
Setting detail: RECURRING SERIES
Discharge: HOME | End: 2025-05-02
Payer: MEDICARE

## 2025-04-29 VITALS — WEIGHT: 171 LBS | BODY MASS INDEX: 28.46 KG/M2

## 2025-04-29 PROCEDURE — 93798 PHYS/QHP OP CAR RHAB W/ECG: CPT

## 2025-04-29 ASSESSMENT — EXERCISE STRESS TEST
PEAK_RPE: DID NOT RECORD
PEAK_METS: 1.7
PEAK_HR: 88

## 2025-05-01 ENCOUNTER — HOSPITAL ENCOUNTER (OUTPATIENT)
Facility: HOSPITAL | Age: 85
Setting detail: RECURRING SERIES
Discharge: HOME | End: 2025-05-04
Payer: MEDICARE

## 2025-05-01 VITALS — WEIGHT: 167.8 LBS | BODY MASS INDEX: 27.92 KG/M2

## 2025-05-01 PROCEDURE — 93798 PHYS/QHP OP CAR RHAB W/ECG: CPT

## 2025-05-01 ASSESSMENT — EXERCISE STRESS TEST
PEAK_HR: 90
PEAK_METS: 3.1
PEAK_RPE: 11

## 2025-05-01 NOTE — CARDIO/PULMONARY
DISCHARGE SUMMARY NOTE  2025      NAME: Alexandr Lewis : 1940 AGE: 84 y.o.  GENDER: male    CARDIAC REHAB ADMITTING DIAGNOSIS: S/P CABG (coronary artery bypass graft) [Z95.1]    REFERRING PHYSICIAN: Zach Lucia MD    MEDICAL HX:  Past Medical History:   Diagnosis Date    Arthritis     CAD (coronary artery disease)     Cancer (HCC)     PROSTATE    Hx SBO     required surgery for lysis of adhesions    Hypertension     Inguinal hernia bilateral, non-recurrent     Kidney disease stage 3 2019    sees Dr Enrrique Polk    Other ill-defined conditions(799.89)     elevated cholesterol    Ventral hernia        LABS:     No results found for: \"HBA1C\", \"[id number]\"  Lab Results   Component Value Date/Time    CHOL 86 2025 02:37 AM    HDL 46 2025 02:37 AM    LDL 31 2025 02:37 AM    VLDL 9 2025 02:37 AM         ANTHROPOMETRICS:      Ht Readings from Last 1 Encounters:   25 1.651 m (5' 5\")      Wt Readings from Last 1 Encounters:   25 76.1 kg (167 lb 12.8 oz)        WAIST: 38         PROGRAM SUMMARY:    Alexandr Lewis completed  sessions in the Cardiac Rehab program. He will continue exercising 3-5 x week and focus on diet and nutrition at home. He attended 0 classes and is aware of his cardiac risk factors and cardiac medications. Weight loss was 2 lbs. MET level increase from 1.7 to 3.1. 6MWT distance increased from 151 m to 162 m.      Questions addressed. Discharge plans discussed. Alexandr Lewis verbalized understanding.      TIFFANIE CHAUHAN RN

## 2025-05-06 ENCOUNTER — APPOINTMENT (OUTPATIENT)
Facility: HOSPITAL | Age: 85
End: 2025-05-06
Payer: MEDICARE

## 2025-05-08 ENCOUNTER — APPOINTMENT (OUTPATIENT)
Facility: HOSPITAL | Age: 85
End: 2025-05-08
Payer: MEDICARE

## 2025-05-13 ENCOUNTER — APPOINTMENT (OUTPATIENT)
Facility: HOSPITAL | Age: 85
End: 2025-05-13
Payer: MEDICARE

## 2025-05-15 ENCOUNTER — APPOINTMENT (OUTPATIENT)
Facility: HOSPITAL | Age: 85
End: 2025-05-15
Payer: MEDICARE

## 2025-05-20 ENCOUNTER — APPOINTMENT (OUTPATIENT)
Facility: HOSPITAL | Age: 85
End: 2025-05-20
Payer: MEDICARE

## 2025-05-22 ENCOUNTER — APPOINTMENT (OUTPATIENT)
Facility: HOSPITAL | Age: 85
End: 2025-05-22
Payer: MEDICARE

## 2025-05-27 ENCOUNTER — APPOINTMENT (OUTPATIENT)
Facility: HOSPITAL | Age: 85
End: 2025-05-27
Payer: MEDICARE

## 2025-05-29 ENCOUNTER — APPOINTMENT (OUTPATIENT)
Facility: HOSPITAL | Age: 85
End: 2025-05-29
Payer: MEDICARE

## (undated) DEVICE — SET PERF L203CM 12IN RED AND BLU AORT ROOT MULT SLIP CONN

## (undated) DEVICE — PROVE COVER: Brand: UNBRANDED

## (undated) DEVICE — SUTURE PROL SZ 4-0 L36IN NONABSORBABLE BLU L26MM SH 1/2 CIR 8521H

## (undated) DEVICE — MEDI-TRACE CADENCE ADULT, DEFIBRILLATION ELECTRODE -RTS  (10 PR/PK) - PHYSIO-CONTROL: Brand: MEDI-TRACE CADENCE

## (undated) DEVICE — SYRINGE MED 10ML LUERLOCK TIP W/O SFTY DISP

## (undated) DEVICE — GLOVE ORTHO 7 1/2   MSG9475

## (undated) DEVICE — SYRINGE MEDICAL 3ML CLEAR PLASTIC STANDARD NON CONTROL LUERLOCK TIP DISPOSABLE

## (undated) DEVICE — YANKAUER,POOLE TIP,STERILE,50/CS: Brand: MEDLINE

## (undated) DEVICE — HEART CATH-MRMC: Brand: MEDLINE INDUSTRIES, INC.

## (undated) DEVICE — AEGIS 1" DISK 4MM HOLE, PEEL OPEN: Brand: MEDLINE

## (undated) DEVICE — TOTAL TRAY, 16FR 10ML SIL FOLEY, URN: Brand: MEDLINE

## (undated) DEVICE — CATHETER ANGIO L100CM DIA6FR TRANSFORMER 3.5 CRV

## (undated) DEVICE — PRESSURE MONITORING SET: Brand: TRUWAVE

## (undated) DEVICE — LIQUIBAND RAPID ADHESIVE 36/CS 0.8ML: Brand: MEDLINE

## (undated) DEVICE — SOLUTION IRRIG 1000ML H2O PIC PLAS SHATTERPROOF CONTAINER

## (undated) DEVICE — SUTURE STRATAFIX SYMMETRIC SZ 1 L18IN ABSRB VLT CT1 L36CM SXPP1A404

## (undated) DEVICE — TUBING SUCT 12FR MAL ALUM SHFT FN CAP VENT UNIV CONN W/ OBT

## (undated) DEVICE — CENTRAL VENOUS CATHETER SET: Brand: COOK

## (undated) DEVICE — 6 FOOT DISPOSABLE EXTENSION CABLE WITH SAFE CONNECT / SCREW-DOWN

## (undated) DEVICE — BLADE CLIPPER GEN PURP NS

## (undated) DEVICE — CANNULA PERF L55IN OD7FR AORT ROOT AG STD TIP FLOW GRD INTRO

## (undated) DEVICE — BLANKET WRM W25XL64IN NONWOVEN SFT LTWT PLIABLE HYPR

## (undated) DEVICE — GLIDESHEATH SLENDER ACCESS KIT: Brand: GLIDESHEATH SLENDER

## (undated) DEVICE — 3M™ TEGADERM™ TRANSPARENT FILM DRESSING FRAME STYLE, 1626W, 4 IN X 4-3/4 IN (10 CM X 12 CM), 50/CT 4CT/CASE: Brand: 3M™ TEGADERM™

## (undated) DEVICE — SUTURE PERMAHAND SZ 3-0 L30IN NONABSORBABLE BLK SILK BRAID A304H

## (undated) DEVICE — SOLUTION IRRIG 1000ML 0.9% SOD CHL USP POUR PLAS BTL

## (undated) DEVICE — PLEDGET SURG W3/16XL0.25IN THK1.65MM PTFE OVL FELT FOR THE

## (undated) DEVICE — GLOVE ORANGE PI 7 1/2   MSG9075

## (undated) DEVICE — SUTURE MONOCRYL SZ 4-0 L27IN ABSRB UD L19MM PS-2 1/2 CIR PRIM Y426H

## (undated) DEVICE — SURGIFOAM SPNG SZ 100

## (undated) DEVICE — 450 ML BOTTLE OF 0.05% CHLORHEXIDINE GLUCONATE IN 99.95% STERILE WATER FOR IRRIGATION, USP AND APPLICATOR.: Brand: IRRISEPT ANTIMICROBIAL WOUND LAVAGE

## (undated) DEVICE — SYRINGE MED 20ML STD CLR PLAS LUERSLIP TIP N CTRL DISP

## (undated) DEVICE — SYRINGE 20ML LL S/C 50

## (undated) DEVICE — HI-TORQUE VERSACORE FLOPPY GUIDE WIRE SYSTEM 145 CM: Brand: HI-TORQUE VERSACORE

## (undated) DEVICE — SUTURE STRATAFIX SPRL PDS + VLT 3-0 15CM DISPOSABLE/SNGLE SXPP1B420

## (undated) DEVICE — WOUND RETRACTOR AND PROTECTOR: Brand: ALEXIS WOUND PROTECTOR-RETRACTOR

## (undated) DEVICE — 72" ARTERIAL PRESSURE TUBING: Brand: ICU MEDICAL

## (undated) DEVICE — DRAPE FLD WRM W44XL66IN C6L FOR INTRATEMP SYS THERMABASIN

## (undated) DEVICE — SYRINGE ANGIO 10 CC BRL STD PRNT POLYCARB LT BLU MEDALLION

## (undated) DEVICE — TOWEL,OR,DSP,ST,BLUE,STD,4/PK,20PK/CS: Brand: MEDLINE

## (undated) DEVICE — 3M™ MEDIPORE™ H SOFT CLOTH SURGICAL TAPE 2864, 4 INCH X 10 YARD (10CM X 9,14M), 12 ROLLS/CASE: Brand: 3M™ MEDIPORE™

## (undated) DEVICE — LEAD PACE L475MM CHNL A OR V MYOCARDIAL STEROID ELUT SIL

## (undated) DEVICE — BLADE OPHTH 180DEG CUT SURF BLU STR SHRP DBL BVL GRINDLESS

## (undated) DEVICE — DEVICE TRNSF SPIK STL 2008S] MICROTEK MEDICAL INC]

## (undated) DEVICE — SYSTEM ENDOSCP VES HARV W/ TOOL CANN SEAL SHT PRT BLNT TIP

## (undated) DEVICE — AVID DUAL STAGE VENOUS DRAINAGE CANNULA: Brand: AVID DUAL STAGE VENOUS DRAINAGE CANNULA

## (undated) DEVICE — CURVED, LARGE JAW, OPEN SEALER/DIVIDER NANO-COATED: Brand: LIGASURE IMPACT

## (undated) DEVICE — AGENT HEMSTAT 3GM OXIDIZED REGENERATED CELOS ABSRB FOR CONT (ORDER MULTIPLES OF 5EA)

## (undated) DEVICE — KIT,ANTI FOG,W/SPONGE & FLUID,SOFT PACK: Brand: MEDLINE

## (undated) DEVICE — SYRINGE MED 50ML LUERLOCK TIP

## (undated) DEVICE — SUTURE PROL SZ 6-0 L30IN NONABSORBABLE BLU L13MM RB-2 1/2 8711H

## (undated) DEVICE — SUTURE PERMAHAND SZ 3-0 L18IN NONABSORBABLE BLK L26MM SH C013D

## (undated) DEVICE — DRESSING FOAM W8.7XL9.1IN SAFETAC LAYR SELF ADH MEPILEX

## (undated) DEVICE — OPEN HEART A- RICHMOND: Brand: MEDLINE INDUSTRIES, INC.

## (undated) DEVICE — CANNULA PERF L2IN BLNT TIP 2MM VES CLR RADPQ BODY FEM LUER

## (undated) DEVICE — MAJOR LAPAROTOMY-MRMC: Brand: MEDLINE INDUSTRIES, INC.

## (undated) DEVICE — SPONGE LAP 18X18IN STRL -- 5/PK

## (undated) DEVICE — ELECTRODE PT RET AD L9FT HI MOIST COND ADH HYDRGEL CORDED

## (undated) DEVICE — GARMENT,MEDLINE,DVT,INT,CALF,MED, GEN2: Brand: MEDLINE

## (undated) DEVICE — COLUMN DRAPE

## (undated) DEVICE — BLADELESS OBTURATOR: Brand: WECK VISTA

## (undated) DEVICE — SPLINT WR VELC FOAM NEUT POS DISP FOR RAD ART ACC SFT STRP

## (undated) DEVICE — SPONGE GZ W4XL4IN COT 12 PLY TYP VII WVN C FLD DSGN

## (undated) DEVICE — SPONGE GZ W4XL4IN COT RADPQ HIGHLY ABSRB STERILE

## (undated) DEVICE — RADIFOCUS GLIDEWIRE: Brand: GLIDEWIRE

## (undated) DEVICE — ARM DRAPE

## (undated) DEVICE — GENERAL LAPAROSCOPY-MRMC: Brand: MEDLINE INDUSTRIES, INC.

## (undated) DEVICE — COVER,TABLE,HEAVY DUTY,77"X90",STRL: Brand: MEDLINE

## (undated) DEVICE — SUTURE VICRYL 3-0 L36IN ABSRB VLT CT-1 L36MM 1/2 CIR J344H

## (undated) DEVICE — SUTURE PROL SZ 7-0 L30IN NONABSORBABLE BLU L9.3MM BV-1 1/2 8703H

## (undated) DEVICE — CONTAINER,SPECIMEN,OR STERILE,4OZ: Brand: MEDLINE

## (undated) DEVICE — SUTURE SZ 7 L18IN NONABSORBABLE SIL CCS L48MM 1/2 CIR STRNM M655G

## (undated) DEVICE — SUTURE MONOCRYL SZ 3-0 L27IN ABSRB UD L24MM PS-1 3/8 CIR PRIM Y936H

## (undated) DEVICE — CANISTER, RIGID, 3000CC: Brand: MEDLINE INDUSTRIES, INC.

## (undated) DEVICE — SOLUTION IV 500 ML 0.9 NACL INJ EXCEL CONTAINER USP LF

## (undated) DEVICE — SUTURE NNBSRBBLE MNFLMNT 2X30 EV DBLE ARMD POLYPR PRLNE

## (undated) DEVICE — 3M™ MEDIPORE™ H SOFT CLOTH TAPE SHORT ROLL TAPE 6INCHES X 2 YARDS 16 ROLLS/CASE 2866S: Brand: 3M™ MEDIPORE™

## (undated) DEVICE — EZ GLIDE AORTIC CANNULA: Brand: EDWARDS LIFESCIENCES EZ GLIDE AORTIC CANNULA

## (undated) DEVICE — RETRACTOR SURG INSRT SUT HLD OCTOBASE

## (undated) DEVICE — Device: Brand: CLEANCUT ROTATING AORTIC PUNCH, 4.5 MM

## (undated) DEVICE — CLIP LIG M BLU TI HRT SHP WIRE HORZ 24 CLIPS/PK 25PK/CA

## (undated) DEVICE — GLOVE SURG SZ 8 L12IN FNGR THK79MIL GRN LTX FREE

## (undated) DEVICE — DRAIN SURG SGL COLL PT TB FOR ATS BG OASIS

## (undated) DEVICE — HYPODERMIC SAFETY NEEDLE: Brand: MAGELLAN

## (undated) DEVICE — SUTURE ABSORBABLE MONOFILAMENT 3-0 PS-2 15 CM 19 MM UD

## (undated) DEVICE — SOLUTION IV 1000ML 140MEQ/L SOD 5MEQ/L K 3MEQ/L MG 27MEQ/L

## (undated) DEVICE — TUBING PRSS MON L6IN PVC M FEM CONN

## (undated) DEVICE — Device: Brand: JELCO

## (undated) DEVICE — SUTURE NONABSORBABLE MONOFILAMENT 6-0 C-1 1X30 IN PROLENE 8706H

## (undated) DEVICE — OPEN HEART B-RICHMOND: Brand: MEDLINE INDUSTRIES, INC.

## (undated) DEVICE — CANNULA SUCT TIP L8MM DIA24FR INTCARD RIG SUC 0.25IN CONN

## (undated) DEVICE — BANDAGE COMPR W6INXL10YD ST M E WHITE/BEIGE

## (undated) DEVICE — SUTURE PERMAHAND SZ 2-0 L30IN NONABSORBABLE BLK SILK W/O A305H

## (undated) DEVICE — DISK-SHAPED STYLE, SILICONE (1 PER STERILE PKG): Brand: SCANLAN® RADIOMARK® GRAFT MARKERS

## (undated) DEVICE — KIT BLWR MISTER 5P 15L W/ TBNG SET IRRIG MIST TO IMPROVE

## (undated) DEVICE — CANNULA PERF 15FR L12.5IN RG STPCOCK WIREWOUND BODY

## (undated) DEVICE — SYRINGE MED 30ML STD CLR PLAS LUERLOCK TIP N CTRL DISP

## (undated) DEVICE — DUAL LUMEN STOMACH TUBE MULTI-FUNCTIONAL PORT: Brand: SALEM SUMP

## (undated) DEVICE — TR BAND RADIAL ARTERY COMPRESSION DEVICE: Brand: TR BAND

## (undated) DEVICE — CATHETER THOR 32FR L23IN PVC 6 EYELET STR ATRAUM

## (undated) DEVICE — DRAPE SLUSH DISC W44XL66IN ST FOR RND BSIN HUSH SLUSH SYS

## (undated) DEVICE — SPONGE HEMOSTAT CELLULS 4X8IN -- SURGICEL

## (undated) DEVICE — TRANSFER BAG 300 ML: Brand: HAEMONETICS

## (undated) DEVICE — SOLUTION ANTIFOG VIS SYS CLEARIFY LAPSCP

## (undated) DEVICE — TEMP PACING WIRE: Brand: MYO/WIRE

## (undated) DEVICE — TIP COVER ACCESSORY

## (undated) DEVICE — GLOVE BIOGEL PI ULTRA TOUCH M SZ 7.5

## (undated) DEVICE — CATHETER DIAG 5FR L100CM LUMN ID0.047IN JL3.5 CRV 0 SIDE H

## (undated) DEVICE — ADHESIVE SKIN CLOSURE XL 42 CM 2.7 CC MESH LIQUIBAND SECUR

## (undated) DEVICE — BLADE ELECTRODE: Brand: EDGE

## (undated) DEVICE — SYSTEM SEAL W/ DEL DEV LOADER 3.8MM AORT CUT HEARTSTRING

## (undated) DEVICE — ROSEN CURVED WIRE GUIDE: Brand: ROSEN

## (undated) DEVICE — 3M™ IOBAN™ 2 ANTIMICROBIAL INCISE DRAPE 6650EZ: Brand: IOBAN™ 2

## (undated) DEVICE — SET TUBE INSUFFLATION HI FLOW PNEUMOCLEAR

## (undated) DEVICE — SEAL

## (undated) DEVICE — AGENT HEMSTAT W2XL4IN FIBRIN SEAL PTCH DSG EVARREST

## (undated) DEVICE — SOLUTION IV 1000 ML 0.9 NACL INJ USP EXCEL PLAS CONTAINER

## (undated) DEVICE — SOLUTION IRRIG 1000ML 09% SOD CHL USP PIC PLAS CONTAINER

## (undated) DEVICE — GOWN,SIRUS,NONRNF,SETINSLV,XL,20/CS: Brand: MEDLINE

## (undated) DEVICE — Device

## (undated) DEVICE — SOLUTION IRRIG 1000ML STRL H2O USP PLAS POUR BTL

## (undated) DEVICE — COVER,MAYO STAND,STERILE: Brand: MEDLINE

## (undated) DEVICE — BLADE OPHTH KNF D3MM 15DEG CATRCT BLU MICRO-SHARP

## (undated) DEVICE — 3M™ IOBAN™ 2 ANTIMICROBIAL INCISE DRAPE 6651EZ: Brand: IOBAN™ 2

## (undated) DEVICE — Z DUPLICATE USE 2909441 NEEDLE SUT L1.521IN DIA0.050IN S STL 1 /2 CIR TAPR PNT MAYO